# Patient Record
Sex: FEMALE | Race: WHITE | NOT HISPANIC OR LATINO | Employment: FULL TIME | URBAN - METROPOLITAN AREA
[De-identification: names, ages, dates, MRNs, and addresses within clinical notes are randomized per-mention and may not be internally consistent; named-entity substitution may affect disease eponyms.]

---

## 2018-11-24 ENCOUNTER — OFFICE VISIT (OUTPATIENT)
Dept: URGENT CARE | Facility: CLINIC | Age: 53
End: 2018-11-24
Payer: COMMERCIAL

## 2018-11-24 ENCOUNTER — HOSPITAL ENCOUNTER (OUTPATIENT)
Facility: MEDICAL CENTER | Age: 53
End: 2018-11-24
Attending: NURSE PRACTITIONER
Payer: COMMERCIAL

## 2018-11-24 VITALS
SYSTOLIC BLOOD PRESSURE: 112 MMHG | OXYGEN SATURATION: 98 % | BODY MASS INDEX: 28.32 KG/M2 | RESPIRATION RATE: 16 BRPM | TEMPERATURE: 99.3 F | DIASTOLIC BLOOD PRESSURE: 62 MMHG | HEART RATE: 97 BPM | HEIGHT: 65 IN | WEIGHT: 170 LBS

## 2018-11-24 DIAGNOSIS — R30.0 DYSURIA: ICD-10-CM

## 2018-11-24 LAB
APPEARANCE UR: CLEAR
BILIRUB UR STRIP-MCNC: NEGATIVE MG/DL
COLOR UR AUTO: YELLOW
GLUCOSE UR STRIP.AUTO-MCNC: NEGATIVE MG/DL
KETONES UR STRIP.AUTO-MCNC: NEGATIVE MG/DL
LEUKOCYTE ESTERASE UR QL STRIP.AUTO: NEGATIVE
NITRITE UR QL STRIP.AUTO: NEGATIVE
PH UR STRIP.AUTO: 5.5 [PH] (ref 5–8)
PROT UR QL STRIP: NEGATIVE MG/DL
RBC UR QL AUTO: NORMAL
SP GR UR STRIP.AUTO: 1.01
UROBILINOGEN UR STRIP-MCNC: 0.2 MG/DL

## 2018-11-24 PROCEDURE — 87086 URINE CULTURE/COLONY COUNT: CPT

## 2018-11-24 PROCEDURE — 99214 OFFICE O/P EST MOD 30 MIN: CPT | Performed by: NURSE PRACTITIONER

## 2018-11-24 PROCEDURE — 81002 URINALYSIS NONAUTO W/O SCOPE: CPT | Performed by: NURSE PRACTITIONER

## 2018-11-24 RX ORDER — NITROFURANTOIN 25; 75 MG/1; MG/1
100 CAPSULE ORAL EVERY 12 HOURS
Qty: 10 CAP | Refills: 0 | Status: SHIPPED | OUTPATIENT
Start: 2018-11-24 | End: 2018-11-29

## 2018-11-24 ASSESSMENT — ENCOUNTER SYMPTOMS
FLANK PAIN: 1
CHILLS: 0
FEVER: 0

## 2018-11-25 DIAGNOSIS — R30.0 DYSURIA: ICD-10-CM

## 2018-11-25 NOTE — PROGRESS NOTES
Subjective:      Madhavi Coffman is a 53 y.o. female who presents with UTI (x4 days, backache, painful urination, frequency, bladder pressure)            Dysuria    This is a new problem. Episode onset: pt reports pain with urination x 3 days. She states the symptoms have gotten more intense each day. Admits to mild left flank pain. Denies any fever, nausea or vomiting. The problem occurs every urination. The problem has been gradually worsening. The quality of the pain is described as burning. There is no history of pyelonephritis. Associated symptoms include flank pain (mild, left), frequency, hesitancy and urgency. Pertinent negatives include no chills or discharge. Associated symptoms comments: Pt also reports she has chronic back pain as well in the same area, feels like it usually does. She has tried increased fluids for the symptoms. The treatment provided no relief. There is no history of recurrent UTIs or urinary stasis.       Review of Systems   Constitutional: Negative for chills and fever.   Genitourinary: Positive for dysuria, flank pain (mild, left), frequency, hesitancy and urgency.   All other systems reviewed and are negative.    Past Medical History:   Diagnosis Date   • Allergy    • Anxiety    • Arthritis     back, osteoarthritis   • Depression    • Diverticulosis    • Hemorrhoids    • Hiatus hernia syndrome     had surgical repair   • Indigestion    • LBP (low back pain) 9/7/12    8/10   • Migraine without aura, with intractable migraine, so stated, without mention of status migrainosus    • Obesity    • Peripheral neuropathy    • Thoracic or lumbosacral neuritis or radiculitis, unspecified     bilateral L4-S1 radiculopathies      Past Surgical History:   Procedure Laterality Date   • INJ,SACROILIAC,ANES/STEROID  10/18/2013    Performed by Sonny Archuleta D.O. at SURGERY [facility] ORS   • INJ,SACROILIAC,ANES/STEROID  8/16/2013    Performed by Sonny Archuleta D.O. at SURGERY SURGICAL  [facility] ORS   • INJ,SACROILIAC,ANES/STEROID  8/16/2013    Performed by Jose Eduardo Archuleta D.O. at [facility] ORS   • NEURO DEST FACET L/S W/IG SNGL  4/19/2013    Performed by Jose Eduardo Archuleta D.O. at [facility] ORS   • NEURO DEST FACET L/S W/IG ADDL  4/19/2013    Performed by Jose Eduardo Archuleta D.O. at [facility] ORS   • NEURO DEST FACET L/S W/IG ADDL  4/19/2013    Performed by Jose Eduardo Archuleta D.O. at [facility] ORS   • NEURO DEST FACET L/S W/IG SNGL  3/29/2013    Performed by Jose Eduardo Archuleta D.O. at [facility] ORS   • NEURO DEST FACET L/S W/IG ADDL  3/29/2013    Performed by Jose Eduardo Archuleta D.O. at [facility] ORS   • NEURO DEST FACET L/S W/IG ADDL  3/29/2013    Performed by Jose Eduardo Archuleta D.O. at [facility] ORS   • NEURO DEST FACET L/S W/IG ADDL  3/29/2013    Performed by Jose Eduardo Archuleta D.O. at [facility] ORS   • INJ,SACROILIAC,ANES/STEROID  12/28/2012    Performed by Jose Eduardo Archuleta D.O. at [facility] ORS   • INJ,SACROILIAC,ANES/STEROID  11/9/2012    Performed by Jose Eduardo Archuleta D.O. at [facility] ORS   • THORACIC LAMINECTOMY  9/18/2012    Performed by King Leal M.D. at [facility] ORS   • SPINAL CORD STIMULATOR  9/18/2012    Performed by King Leal M.D. at [facility] ORS   • INJ,SACROILIAC,ANES/STEROID  7/27/2012    Performed by JOSE EDUARDO ARCHULETA at [facility] ORS   • INJ,SACROILIAC,ANES/STEROID  7/27/2012    Performed by JOSE EDUARDO ARCHULETA at [facility] ORS   • PB PERCUT IMPLNT NEUROELECT,EPIDURAL  6/22/2012    Performed by JOSE EDUARDO ARCHULETA at [facility] ORS   • PB PERCUT IMPLNT NEUROELECT,EPIDURAL  6/22/2012    Performed by JOSE EDUARDO ARCHULETA at SURGERY [facility] ORS   • LUMBAR FUSION POSTERIOR  7/26/2011    Performed by DANIAL KABA at SURGERY [facility] ORS   • LUMBAR DECOMPRESSION  7/26/2011    Performed by  "DANIAL KABA at SURGERY [facility] ORS   • HARDWARE REMOVAL ORTHO  7/26/2011    Performed by DANIAL KABA at SURGERY [facility] ORS   • S I JOINT FUSION  11/11/2010    Performed by DANIAL KABA at SURGERY [facility] ORS   • GASTRIC BYPASS LAPAROSCOPIC  4/5/2010    Performed by GANSER, JOHN H at SURGERY [facility] ORS   • HIATAL HERNIA REPAIR  4/5/2010    Performed by GANSER, JOHN H at SURGERY [facility] ORS   • LUMBAR FUSION POSTERIOR  11/4/2009    Performed by DANIAL KABA at SURGERY [facility] ORS   • LUMBAR DECOMPRESSION  11/4/2009    Performed by DANIAL KABA at SURGERY [facility] ORS   • PB REMOVAL OF OVARY/TUBE(S)  2005    left   • SIGMOID COLECTOMY  2004   • OTHER  1999    lasik surgery   • TONSILLECTOMY  1986   • CHOLECYSTECTOMY     • GASTRIC BYPASS LAPAROSCOPIC      x2   • PB REDUCTION OF LARGE BREAST        Social History     Social History   • Marital status:      Spouse name: N/A   • Number of children: N/A   • Years of education: N/A     Occupational History   • Not on file.     Social History Main Topics   • Smoking status: Former Smoker     Packs/day: 1.00     Years: 20.00     Types: Cigarettes     Quit date: 1/1/1997   • Smokeless tobacco: Never Used      Comment: 1995   • Alcohol use 0.5 oz/week     1 Standard drinks or equivalent per week      Comment: 2 per week   • Drug use: No   • Sexual activity: Not on file     Other Topics Concern   • Not on file     Social History Narrative   • No narrative on file          Objective:     /62   Pulse 97   Temp 37.4 °C (99.3 °F) (Temporal)   Resp 16   Ht 1.651 m (5' 5\")   Wt 77.1 kg (170 lb)   LMP 08/03/1999   SpO2 98%   BMI 28.29 kg/m²      Physical Exam   Constitutional: She is oriented to person, place, and time. Vital signs are normal. She appears well-developed and well-nourished.   HENT:   Head: Normocephalic and atraumatic.   Eyes: Pupils are equal, round, and reactive to light. EOM are normal. "   Neck: Normal range of motion.   Cardiovascular: Normal rate and regular rhythm.    Pulmonary/Chest: Effort normal.   Abdominal: There is tenderness in the suprapubic area. There is CVA tenderness (mild left).   Musculoskeletal: Normal range of motion.   Neurological: She is alert and oriented to person, place, and time.   Skin: Skin is warm and dry. Capillary refill takes less than 2 seconds.   Psychiatric: She has a normal mood and affect. Her speech is normal and behavior is normal. Thought content normal.   Vitals reviewed.         Lab Results   Component Value Date/Time    POCCOLOR yellow 11/24/2018 03:52 PM    POCAPPEAR clear 11/24/2018 03:52 PM    POCLEUKEST Negative 11/24/2018 03:52 PM    POCNITRITE Negative 11/24/2018 03:52 PM    POCUROBILIGE 0.2 11/24/2018 03:52 PM    POCPROTEIN Negative 11/24/2018 03:52 PM    POCURPH 5.5 11/24/2018 03:52 PM    POCBLOOD Trace 11/24/2018 03:52 PM    POCSPGRV 1.010 11/24/2018 03:52 PM    POCKETONES Negative 11/24/2018 03:52 PM    POCBILIRUBIN Negative 11/24/2018 03:52 PM    POCGLUCUA Negative 11/24/2018 03:52 PM           Assessment/Plan:     1. Dysuria  - POCT Urinalysis  - Urine Culture; Future  - nitrofurantoin monohydr macro (MACROBID) 100 MG Cap; Take 1 Cap by mouth every 12 hours for 5 days.  Dispense: 10 Cap; Refill: 0    Increase water intake  Alternate tylenol and ibuprofen as needed for pain  Will call with cx results if I need to change ABX  Supportive care, differential diagnoses, and indications for immediate follow-up discussed with patient.    Pathogenesis of diagnosis discussed including typical length and natural progression.      Instructed to return to  or nearest emergency department if symptoms fail to improve, for any change in condition, further concerns, or new concerning symptoms.  Patient states understanding of the plan of care and discharge instructions.

## 2018-11-26 ENCOUNTER — OFFICE VISIT (OUTPATIENT)
Dept: URGENT CARE | Facility: CLINIC | Age: 53
End: 2018-11-26
Payer: COMMERCIAL

## 2018-11-26 ENCOUNTER — HOSPITAL ENCOUNTER (OUTPATIENT)
Facility: MEDICAL CENTER | Age: 53
End: 2018-11-26
Attending: PHYSICIAN ASSISTANT
Payer: COMMERCIAL

## 2018-11-26 ENCOUNTER — APPOINTMENT (OUTPATIENT)
Dept: RADIOLOGY | Facility: IMAGING CENTER | Age: 53
End: 2018-11-26
Attending: PHYSICIAN ASSISTANT
Payer: COMMERCIAL

## 2018-11-26 VITALS
DIASTOLIC BLOOD PRESSURE: 64 MMHG | WEIGHT: 156 LBS | BODY MASS INDEX: 25.96 KG/M2 | SYSTOLIC BLOOD PRESSURE: 122 MMHG | HEART RATE: 105 BPM | OXYGEN SATURATION: 98 % | RESPIRATION RATE: 16 BRPM | TEMPERATURE: 98.4 F

## 2018-11-26 DIAGNOSIS — R30.0 DYSURIA: ICD-10-CM

## 2018-11-26 DIAGNOSIS — R11.2 NAUSEA AND VOMITING, INTRACTABILITY OF VOMITING NOT SPECIFIED, UNSPECIFIED VOMITING TYPE: ICD-10-CM

## 2018-11-26 DIAGNOSIS — R10.9 ABDOMINAL PAIN, UNSPECIFIED ABDOMINAL LOCATION: ICD-10-CM

## 2018-11-26 DIAGNOSIS — K59.00 CONSTIPATION, UNSPECIFIED CONSTIPATION TYPE: ICD-10-CM

## 2018-11-26 LAB
APPEARANCE UR: CLEAR
BILIRUB UR STRIP-MCNC: NORMAL MG/DL
COLOR UR AUTO: YELLOW
GLUCOSE UR STRIP.AUTO-MCNC: NORMAL MG/DL
KETONES UR STRIP.AUTO-MCNC: NORMAL MG/DL
LEUKOCYTE ESTERASE UR QL STRIP.AUTO: NORMAL
NITRITE UR QL STRIP.AUTO: NORMAL
PH UR STRIP.AUTO: 7 [PH] (ref 5–8)
PROT UR QL STRIP: NORMAL MG/DL
RBC UR QL AUTO: NORMAL
SP GR UR STRIP.AUTO: 1.01
UROBILINOGEN UR STRIP-MCNC: NORMAL MG/DL

## 2018-11-26 PROCEDURE — 81002 URINALYSIS NONAUTO W/O SCOPE: CPT | Performed by: PHYSICIAN ASSISTANT

## 2018-11-26 PROCEDURE — 99214 OFFICE O/P EST MOD 30 MIN: CPT | Performed by: PHYSICIAN ASSISTANT

## 2018-11-26 PROCEDURE — 87480 CANDIDA DNA DIR PROBE: CPT

## 2018-11-26 PROCEDURE — 87510 GARDNER VAG DNA DIR PROBE: CPT

## 2018-11-26 PROCEDURE — 74018 RADEX ABDOMEN 1 VIEW: CPT | Mod: 26 | Performed by: PHYSICIAN ASSISTANT

## 2018-11-26 PROCEDURE — 87660 TRICHOMONAS VAGIN DIR PROBE: CPT

## 2018-11-26 RX ORDER — ONDANSETRON 4 MG/1
4 TABLET, FILM COATED ORAL EVERY 4 HOURS PRN
Qty: 20 TAB | Refills: 0 | Status: SHIPPED | OUTPATIENT
Start: 2018-11-26 | End: 2023-04-08

## 2018-11-26 ASSESSMENT — ENCOUNTER SYMPTOMS
MYALGIAS: 0
CHILLS: 0
BLOOD IN STOOL: 0
BACK PAIN: 0
DIZZINESS: 0
DIARRHEA: 1
PALPITATIONS: 0
SHORTNESS OF BREATH: 0
BLURRED VISION: 0
EYE PAIN: 0
FEVER: 0
COUGH: 0
ABDOMINAL PAIN: 1
CONSTIPATION: 1
SORE THROAT: 0
VOMITING: 1
NAUSEA: 1

## 2018-11-26 NOTE — PROGRESS NOTES
Subjective:      Madhavi Coffman is a 53 y.o. female who presents with Allergic Reaction (to NITROFURANTOI MONO-MCR with vomiting and not getting better with diarrhea and constipation at the same time.)      HPI:  This is a new problem.  Patient was seen on 11/24/2018 for complaints of dysuria.  Urinalysis was negative for infection and urine was sent out for culture which so far has also shown no growth.  Patient was placed on Macrobid for her symptoms.  She states that she took her first pill yesterday morning and within an hour or so of taking the medication she vomited.  She said last night she tried taking the medication again and again vomited.  She has not taken the medication since that second dose and states that she still has some intermittent nausea/vomiting.  She also reports that she has some mild abdominal pain as well as constipation and diarrhea over the past two days.  She denies any suspect food intake.  The only change has been the Macrobid.  She also reports that she continues to have pain with urination.  She is not currently sexually active and has no concern for STDs.          Review of Systems   Constitutional: Negative for chills and fever.   HENT: Negative for congestion and sore throat.    Eyes: Negative for blurred vision and pain.   Respiratory: Negative for cough and shortness of breath.    Cardiovascular: Negative for chest pain and palpitations.   Gastrointestinal: Positive for abdominal pain, constipation, diarrhea, nausea and vomiting. Negative for blood in stool and melena.   Musculoskeletal: Negative for back pain and myalgias.   Skin: Negative for rash.   Neurological: Negative for dizziness.       PMH:  has a past medical history of Allergy; Anxiety; Arthritis; Depression; Diverticulosis; Hemorrhoids; Hiatus hernia syndrome; Indigestion; LBP (low back pain) (9/7/12); Migraine without aura, with intractable migraine, so stated, without mention of status migrainosus; Obesity;  Peripheral neuropathy; and Thoracic or lumbosacral neuritis or radiculitis, unspecified. She also has no past medical history of Breast cancer (HCC).  MEDS:   Current Outpatient Prescriptions:   •  nitrofurantoin monohydr macro (MACROBID) 100 MG Cap, Take 1 Cap by mouth every 12 hours for 5 days., Disp: 10 Cap, Rfl: 0  •  topiramate (TOPAMAX) 100 MG Tab, TAKE 1 TABLET BY MOUTH IN THE EVENING, Disp: 90 Tab, Rfl: 3  •  ALPRAZolam (XANAX PO), Take  by mouth., Disp: , Rfl:   •  TRAZODONE HCL PO, Take  by mouth., Disp: , Rfl:   •  amoxicillin-clavulanate (AUGMENTIN) 875-125 MG Tab, Take 1 Tab by mouth 2 times a day., Disp: 14 Tab, Rfl: 0  •  Sumatriptan-Naproxen Sodium (TREXIMET)  MG TABS, 1 tab at headache onset; repeat in 1 hour prn, Disp: 27 Tab, Rfl: 3  •  oxycodone-acetaminophen (PERCOCET) 7.5-325 MG per tablet, Take 1-2 Tabs by mouth every four hours as needed., Disp: , Rfl:   ALLERGIES:   Allergies   Allergen Reactions   • Cleocin [Clindamycin Hcl]      Stomach upset   • Neosporin [Neomycin-Bacitracin-Polymyxin] Vomiting and Swelling   • Sudafed [Pseudoephedrine Hcl] Rash   • Tape Contact Dermatitis     SURGHX:   Past Surgical History:   Procedure Laterality Date   • INJ,SACROILIAC,ANES/STEROID  10/18/2013    Performed by Sonny Archuleta D.O. at [facility] ORS   • INJ,SACROILIAC,ANES/STEROID  8/16/2013    Performed by Sonny Archuleta D.O. at [facility] ORS   • INJ,SACROILIAC,ANES/STEROID  8/16/2013    Performed by Sonny Archuleta D.O. at [facility] ORS   • NEURO DEST FACET L/S W/IG SNGL  4/19/2013    Performed by Sonny Archuleta D.O. at [facility] ORS   • NEURO DEST FACET L/S W/IG ADDL  4/19/2013    Performed by Sonny Archuleta D.O. at [facility] ORS   • NEURO DEST FACET L/S W/IG ADDL  4/19/2013    Performed by Sonny Archuleta D.O. at [facility] ORS   • NEURO DEST FACET L/S W/IG SNGL  3/29/2013    Performed by Sonny BURKS  ACACIA Jones at [facility] ORS   • NEURO DEST FACET L/S W/IG ADDL  3/29/2013    Performed by Sonny Jones D.O. at [facility]   • NEURO DEST FACET L/S W/IG ADDL  3/29/2013    Performed by Sonny Jones D.O. at [facility]   • NEURO DEST FACET L/S W/IG ADDL  3/29/2013    Performed by Sonny Jones D.O. at [facility]   • INJ,SACROILIAC,ANES/STEROID  12/28/2012    Performed by Sonny Jones D.O. at [facility]   • INJ,SACROILIAC,ANES/STEROID  11/9/2012    Performed by Sonny Jones D.O. at [facility]   • THORACIC LAMINECTOMY  9/18/2012    Performed by King Leal M.D. at [facility]   • SPINAL CORD STIMULATOR  9/18/2012    Performed by King Leal M.D. at [facility]   • INJ,SACROILIAC,ANES/STEROID  7/27/2012    Performed by SONNY JONES at [facility]   • INJ,SACROILIAC,ANES/STEROID  7/27/2012    Performed by SONNY JONES at [facility]   • PB PERCUT IMPLNT NEUROELECT,EPIDURAL  6/22/2012    Performed by SONNY JONES at [facility]   • PB PERCUT IMPLNT NEUROELECT,EPIDURAL  6/22/2012    Performed by SONNY JONES at [facility] ORS   • LUMBAR FUSION POSTERIOR  7/26/2011    Performed by DANIAL KABA at [facility] ORS   • LUMBAR DECOMPRESSION  7/26/2011    Performed by DANIAL KABA at [facility] ORS   • HARDWARE REMOVAL ORTHO  7/26/2011    Performed by DANIAL KABA at [facility] ORS   • S I JOINT FUSION  11/11/2010    Performed by DANIAL KABA at [facility] ORS   • GASTRIC BYPASS LAPAROSCOPIC  4/5/2010    Performed by GANSER, JOHN H at [facility]   • HIATAL HERNIA REPAIR  4/5/2010    Performed by GANSER, JOHN H at SURGERY [facility] ORS   • LUMBAR FUSION POSTERIOR  11/4/2009    Performed by DANIAL KABA at SURGERY [facility] ORS   •  LUMBAR DECOMPRESSION  11/4/2009    Performed by DANIAL KABA at SURGERY [facility] ORS   • PB REMOVAL OF OVARY/TUBE(S)  2005    left   • SIGMOID COLECTOMY  2004   • OTHER  1999    lasik surgery   • TONSILLECTOMY  1986   • CHOLECYSTECTOMY     • GASTRIC BYPASS LAPAROSCOPIC      x2   • PB REDUCTION OF LARGE BREAST       SOCHX:  reports that she quit smoking about 21 years ago. Her smoking use included Cigarettes. She has a 20.00 pack-year smoking history. She has never used smokeless tobacco. She reports that she drinks about 0.5 oz of alcohol per week . She reports that she does not use drugs.  FH: Family history was reviewed, no pertinent findings to report     Objective:     /64 (BP Location: Left arm, Patient Position: Sitting, BP Cuff Size: Adult)   Pulse (!) 105   Temp 36.9 °C (98.4 °F) (Temporal)   Resp 16   Wt 70.8 kg (156 lb)   LMP 08/03/1999   SpO2 98%   BMI 25.96 kg/m²        Physical Exam   Constitutional: She is oriented to person, place, and time. She appears well-developed and well-nourished.   HENT:   Head: Normocephalic and atraumatic.   Right Ear: External ear normal.   Left Ear: External ear normal.   Eyes: Pupils are equal, round, and reactive to light. Conjunctivae are normal.   Neck: Normal range of motion.   Cardiovascular: Normal rate, regular rhythm and normal heart sounds.    No murmur heard.  Pulmonary/Chest: Effort normal and breath sounds normal. She has no wheezes.   Abdominal: Soft. Normal appearance and bowel sounds are normal. She exhibits no distension. There is no hepatosplenomegaly. There is tenderness (patient has mild tenderness to palpation over the abdomen in the right upper and lower quadrants with no guarding or rigidity noted) in the right upper quadrant and right lower quadrant. There is no rigidity, no rebound, no guarding, no CVA tenderness, no tenderness at McBurney's point and negative Santillan's sign.   Lymphadenopathy:     She has no cervical adenopathy.    Neurological: She is alert and oriented to person, place, and time.   Skin: Skin is warm and dry. Capillary refill takes less than 2 seconds.   Psychiatric: Her behavior is normal. Judgment normal. Her mood appears anxious.   Vitals reviewed.      [id number] VIEW  Impression:   1.  There is a moderate amount stool throughout the colon.  2.  There is no evidence of bowel obstruction.       Assessment/Plan:     1. Constipation, unspecified constipation type  - [id number] VIEW  - Patient has Miralax at home which she will try  - If Miralax does not resolve symptoms advised patient to try colace  - Increase fluid intake    2. Dysuria  - VAGINAL PATHOGENS DNA PANEL; Future  - POCT Urinalysis  - REFERRAL TO UROLOGY    3. Nausea and vomiting, intractability of vomiting not specified, unspecified vomiting type  - ondansetron (ZOFRAN) 4 MG Tab tablet; Take 1 Tab by mouth every four hours as needed for Nausea/Vomiting.  Dispense: 20 Tab; Refill: 0        Differential Diagnosis, natural history, and supportive care discussed. Return to the Urgent Care or follow up with your PCP if symptoms fail to resolve, or for any new or worsening symptoms. Emergency room precautions discussed. Patient and/or family appears understanding of information.

## 2018-11-27 LAB
BACTERIA UR CULT: NORMAL
CANDIDA DNA VAG QL PROBE+SIG AMP: NEGATIVE
G VAGINALIS DNA VAG QL PROBE+SIG AMP: NEGATIVE
SIGNIFICANT IND 70042: NORMAL
SITE SITE: NORMAL
SOURCE SOURCE: NORMAL
T VAGINALIS DNA VAG QL PROBE+SIG AMP: NEGATIVE

## 2018-12-24 ENCOUNTER — OFFICE VISIT (OUTPATIENT)
Dept: URGENT CARE | Facility: CLINIC | Age: 53
End: 2018-12-24
Payer: COMMERCIAL

## 2018-12-24 VITALS
OXYGEN SATURATION: 98 % | TEMPERATURE: 98.6 F | HEIGHT: 65 IN | DIASTOLIC BLOOD PRESSURE: 84 MMHG | RESPIRATION RATE: 12 BRPM | BODY MASS INDEX: 25.99 KG/M2 | SYSTOLIC BLOOD PRESSURE: 100 MMHG | HEART RATE: 65 BPM | WEIGHT: 156 LBS

## 2018-12-24 DIAGNOSIS — M62.838 MUSCLE SPASM: ICD-10-CM

## 2018-12-24 DIAGNOSIS — M54.5 LOW BACK PAIN, UNSPECIFIED BACK PAIN LATERALITY, UNSPECIFIED CHRONICITY, WITH SCIATICA PRESENCE UNSPECIFIED: ICD-10-CM

## 2018-12-24 PROCEDURE — 99214 OFFICE O/P EST MOD 30 MIN: CPT | Performed by: PHYSICIAN ASSISTANT

## 2018-12-24 RX ORDER — KETOROLAC TROMETHAMINE 30 MG/ML
60 INJECTION, SOLUTION INTRAMUSCULAR; INTRAVENOUS ONCE
Status: COMPLETED | OUTPATIENT
Start: 2018-12-24 | End: 2018-12-24

## 2018-12-24 RX ORDER — BACLOFEN 10 MG/1
10 TABLET ORAL 3 TIMES DAILY
Qty: 30 TAB | Refills: 0 | Status: SHIPPED | OUTPATIENT
Start: 2018-12-24 | End: 2023-04-08

## 2018-12-24 RX ADMIN — KETOROLAC TROMETHAMINE 60 MG: 30 INJECTION, SOLUTION INTRAMUSCULAR; INTRAVENOUS at 17:53

## 2018-12-27 ASSESSMENT — ENCOUNTER SYMPTOMS
FEVER: 0
SORE THROAT: 0
BACK PAIN: 1
SHORTNESS OF BREATH: 0
DIARRHEA: 0
EYE DISCHARGE: 0
WHEEZING: 0
EYE REDNESS: 0
SINUS PAIN: 0
NECK PAIN: 0
WEAKNESS: 1
BOWEL INCONTINENCE: 0
CHILLS: 0
ABDOMINAL PAIN: 0
LEG PAIN: 1
TINGLING: 1

## 2018-12-27 NOTE — PROGRESS NOTES
"Subjective:      Madhavi Coffman is a 53 y.o. female who presents with Spasms (in back x today )            Pt is a 54 y/o female who presents to  with lower bilateral back spasm for the last 1-2 days. She reports long hx of back pain with SI fusion and back stimulator. She has been managing her pain well with yoga and CPD creams- however 2 days ago she believes she might of slept wrong when she awoke she was very tight on her right side and now on both lower aspects of her back. She denies any new leg weakness- she reports that her baseline is weakness on the left lower ext. She denies any incontinence, or foot drop.         Back Pain   This is a chronic problem. Episode onset: New spasm- 2 days ago.  The problem occurs constantly. The problem is unchanged. The pain is present in the lumbar spine and gluteal. The pain is at a severity of 7/10. The pain is moderate. The symptoms are aggravated by position. Associated symptoms include leg pain, tingling and weakness. Pertinent negatives include no abdominal pain, bladder incontinence, bowel incontinence, dysuria, fever or pelvic pain. Treatments tried: As above.        Review of Systems   Constitutional: Negative for chills and fever.   HENT: Negative for sinus pain and sore throat.    Eyes: Negative for discharge and redness.   Respiratory: Negative for shortness of breath and wheezing.    Cardiovascular: Negative for leg swelling.   Gastrointestinal: Negative for abdominal pain, bowel incontinence and diarrhea.   Genitourinary: Negative for bladder incontinence, dysuria and pelvic pain.   Musculoskeletal: Positive for back pain. Negative for joint pain and neck pain.   Neurological: Positive for tingling and weakness.   All other systems reviewed and are negative.         Objective:     /84   Pulse 65   Temp 37 °C (98.6 °F)   Resp 12   Ht 1.651 m (5' 5\")   Wt 70.8 kg (156 lb)   LMP 08/03/1999   SpO2 98%   BMI 25.96 kg/m²    PMH:  has a past " medical history of Allergy; Anxiety; Arthritis; Depression; Diverticulosis; Hemorrhoids; Hiatus hernia syndrome; Indigestion; LBP (low back pain) (9/7/12); Migraine without aura, with intractable migraine, so stated, without mention of status migrainosus; Obesity; Peripheral neuropathy; and Thoracic or lumbosacral neuritis or radiculitis, unspecified. She also has no past medical history of Breast cancer (HCC).  MEDS:   Current Outpatient Prescriptions:   •  baclofen (LIORESAL) 10 MG Tab, Take 1 Tab by mouth 3 times a day., Disp: 30 Tab, Rfl: 0  •  ondansetron (ZOFRAN) 4 MG Tab tablet, Take 1 Tab by mouth every four hours as needed for Nausea/Vomiting. (Patient not taking: Reported on 12/24/2018), Disp: 20 Tab, Rfl: 0  •  topiramate (TOPAMAX) 100 MG Tab, TAKE 1 TABLET BY MOUTH IN THE EVENING (Patient not taking: Reported on 12/24/2018), Disp: 90 Tab, Rfl: 3  •  ALPRAZolam (XANAX PO), Take  by mouth., Disp: , Rfl:   •  TRAZODONE HCL PO, Take  by mouth., Disp: , Rfl:   •  amoxicillin-clavulanate (AUGMENTIN) 875-125 MG Tab, Take 1 Tab by mouth 2 times a day. (Patient not taking: Reported on 12/24/2018), Disp: 14 Tab, Rfl: 0  •  Sumatriptan-Naproxen Sodium (TREXIMET)  MG TABS, 1 tab at headache onset; repeat in 1 hour prn (Patient not taking: Reported on 12/24/2018), Disp: 27 Tab, Rfl: 3  •  oxycodone-acetaminophen (PERCOCET) 7.5-325 MG per tablet, Take 1-2 Tabs by mouth every four hours as needed., Disp: , Rfl:   ALLERGIES:   Allergies   Allergen Reactions   • Cleocin [Clindamycin Hcl]      Stomach upset   • Neosporin [Neomycin-Bacitracin-Polymyxin] Vomiting and Swelling   • Sudafed [Pseudoephedrine Hcl] Rash   • Tape Contact Dermatitis     SURGHX:   Past Surgical History:   Procedure Laterality Date   • INJ,SACROILIAC,ANES/STEROID  10/18/2013    Performed by Sonny Archuleta D.O. [facility]   • INJ,SACROILIAC,ANES/STEROID  8/16/2013    Performed by Sonny Archuleta D.O. at [facility]   • INJ,SACROILIAC,ANES/STEROID  8/16/2013    Performed by Jose Eduardo Archuleta D.O. at [facility]S   • NEURO DEST FACET L/S W/IG SNGL  4/19/2013    Performed by Jose Eduardo Archuleta D.O. at [facility]   • NEURO DEST FACET L/S W/IG ADDL  4/19/2013    Performed by Jose Eduardo Archuleta D.O. at [facility]   • NEURO DEST FACET L/S W/IG ADDL  4/19/2013    Performed by Jose Eduardo Archuleta D.O. at [facility]   • NEURO DEST FACET L/S W/IG SNGL  3/29/2013    Performed by Jose Eduardo Archuleta D.O. at [facility]   • NEURO DEST FACET L/S W/IG ADDL  3/29/2013    Performed by Jose Eduardo Archuleta D.O. at [facility]   • NEURO DEST FACET L/S W/IG ADDL  3/29/2013    Performed by Jose Eduardo Archuleta D.O. at [facility]   • NEURO DEST FACET L/S W/IG ADDL  3/29/2013    Performed by Jose Eduardo Archuleta D.O. at [facility]   • INJ,SACROILIAC,ANES/STEROID  12/28/2012    Performed by Jose Eduardo Archuleta D.O. at [facility]   • INJ,SACROILIAC,ANES/STEROID  11/9/2012    Performed by Jose Eduardo Archuleta D.O. at [facility] OR[facility]   • THORACIC LAMINECTOMY  9/18/2012    Performed by King Leal M.D. at [facility] ORS   • SPINAL CORD STIMULATOR  9/18/2012    Performed by King Leal M.D. at [facility] ORS   • INJ,SACROILIAC,ANES/STEROID  7/27/2012    Performed by JOSE EDUARDO ARCHULETA at [facility]   • INJ,SACROILIAC,ANES/STEROID  7/27/2012    Performed by JOSE EDUARDO ARCHULETA at [facility]   • PB PERCUT IMPLNT NEUROELECT,EPIDURAL  6/22/2012    Performed by JOSE EDUARDO ARCHULETA at [facility] ORS   • PB PERCUT IMPLNT NEUROELECT,EPIDURAL  6/22/2012    Performed by JOSE EDUARDO ARCHULETA at [facility]   • LUMBAR FUSION POSTERIOR  7/26/2011    Performed by DANIAL KABA at [facility]   • LUMBAR DECOMPRESSION  7/26/2011    Performed by  DANIAL KABA at [facility]   • HARDWARE REMOVAL ORTHO  7/26/2011    Performed by DANIAL KABA at [facility]   • S I JOINT FUSION  11/11/2010    Performed by DANIAL KABA at [facility]   • GASTRIC BYPASS LAPAROSCOPIC  4/5/2010    Performed by GANSER, JOHN H at SUR[facility] ORS   • HIATAL HERNIA REPAIR  4/5/2010    Performed by GANSER, JOHN H at SUR[facility] ORS   • LUMBAR FUSION POSTERIOR  11/4/2009    Performed by DANIAL KABA at [facility]S   • LUMBAR DECOMPRESSION  11/4/2009    Performed by DANIAL KABA at [facility]   • PB REMOVAL OF OVARY/TUBE(S)  2005    left   • SIGMOID COLECTOMY  2004   • OTHER  1999    lasik surgery   • TONSILLECTOMY  1986   • CHOLECYSTECTOMY     • GASTRIC BYPASS LAPAROSCOPIC      x2   • PB REDUCTION OF LARGE BREAST       SOCHX:  reports that she quit smoking about 22 years ago. Her smoking use included Cigarettes. She has a 20.00 pack-year smoking history. She has never used smokeless tobacco. She reports that she drinks about 0.5 oz of alcohol per week . She reports that she does not use drugs.  FH: Family history was reviewed, no pertinent findings to report    Physical Exam   Constitutional: She is oriented to person, place, and time. She appears well-developed and well-nourished. No distress.   HENT:   Head: Normocephalic and atraumatic.   Right Ear: External ear normal.   Left Ear: External ear normal.   Mouth/Throat: Oropharynx is clear and moist. No oropharyngeal exudate.   Eyes: Pupils are equal, round, and reactive to light. Conjunctivae and EOM are normal.   Neck: Normal range of motion. Neck supple. No tracheal deviation present.   Cardiovascular: Normal rate and regular rhythm.    No murmur heard.  Pulmonary/Chest: Effort normal.   Musculoskeletal:        Lumbar back: She exhibits decreased range of motion, tenderness and spasm. She exhibits normal pulse.        Back:    Without  discrete midline tenderness- spasm to area on chart. Left lower ext .with slight weakness. DPF intact and symmetrical. DTRs 2+. N/V intact distally.    Neurological: She is alert and oriented to person, place, and time.   Skin: Skin is warm. No rash noted.   Psychiatric: She has a normal mood and affect. Her behavior is normal. Judgment and thought content normal.   Vitals reviewed.              Assessment/Plan:     1. Muscle spasm  - ketorolac (TORADOL) injection 60 mg; 2 mL by Intramuscular route Once.  - baclofen (LIORESAL) 10 MG Tab; Take 1 Tab by mouth 3 times a day.  Dispense: 30 Tab; Refill: 0    2. Low back pain, unspecified back pain laterality, unspecified chronicity, with sciatica presence unspecified  - ketorolac (TORADOL) injection 60 mg; 2 mL by Intramuscular route Once.  - baclofen (LIORESAL) 10 MG Tab; Take 1 Tab by mouth 3 times a day.  Dispense: 30 Tab; Refill: 0    Pt has prior hx of these episodes however notes that it has been a few years. She no longer utilizes her stimulator however still has it.   Pt. Has been successful in the past with Baclofen. She is to avoid other NSAIDs at this time.   Patient given precautionary s/sx that mandate immediate follow up and evaluation in the ED. Advised of risks of not doing so.    DDX, Supportive care, and indications for immediate follow-up discussed with patient.    Instructed to return to clinic or nearest emergency department if we are not available for any change in condition, further concerns, or worsening of symptoms.    The patient demonstrated a good understanding and agreed with the treatment plan.  Please note that this dictation was created using voice recognition software. I have made every reasonable attempt to correct obvious errors, but I expect that there are errors of grammar and possibly content that I did not discover before finalizing the note.

## 2019-06-02 ENCOUNTER — APPOINTMENT (OUTPATIENT)
Dept: RADIOLOGY | Facility: IMAGING CENTER | Age: 54
End: 2019-06-02
Attending: NURSE PRACTITIONER
Payer: COMMERCIAL

## 2019-06-02 ENCOUNTER — OFFICE VISIT (OUTPATIENT)
Dept: URGENT CARE | Facility: CLINIC | Age: 54
End: 2019-06-02
Payer: COMMERCIAL

## 2019-06-02 VITALS
BODY MASS INDEX: 25.99 KG/M2 | TEMPERATURE: 99.2 F | SYSTOLIC BLOOD PRESSURE: 126 MMHG | OXYGEN SATURATION: 98 % | DIASTOLIC BLOOD PRESSURE: 88 MMHG | HEART RATE: 89 BPM | WEIGHT: 156 LBS | HEIGHT: 65 IN | RESPIRATION RATE: 16 BRPM

## 2019-06-02 DIAGNOSIS — Z87.19 HISTORY OF REPAIR OF HIATAL HERNIA: ICD-10-CM

## 2019-06-02 DIAGNOSIS — Z98.890 HISTORY OF REPAIR OF HIATAL HERNIA: ICD-10-CM

## 2019-06-02 DIAGNOSIS — R14.2 BELCHING: ICD-10-CM

## 2019-06-02 DIAGNOSIS — R07.89 CHEST DISCOMFORT: ICD-10-CM

## 2019-06-02 PROCEDURE — 71046 X-RAY EXAM CHEST 2 VIEWS: CPT | Mod: TC | Performed by: NURSE PRACTITIONER

## 2019-06-02 PROCEDURE — 99214 OFFICE O/P EST MOD 30 MIN: CPT | Performed by: NURSE PRACTITIONER

## 2019-06-02 PROCEDURE — 93000 ELECTROCARDIOGRAM COMPLETE: CPT | Performed by: NURSE PRACTITIONER

## 2019-06-02 ASSESSMENT — ENCOUNTER SYMPTOMS
ROS GI COMMENTS: BELCHING
HEARTBURN: 1

## 2019-06-03 NOTE — PROGRESS NOTES
Subjective:      Madhavi Coffman is a 54 y.o. female who presents with Hiatal Hernia (has hx of hiatal hernia, had surgery for it years ago but thinks it might be back, shoulder hurts whenever she eats or drinks anything)            This is a new problem. Pt reports new onset of hiatal hernia symptoms about one year ago. She states these symptoms are familiar because she has a hx of hiatal hernia repair several years ago. Also has hx of gastric bypass. She states her symptoms have recently been getting worse with more chest discomfort, feeling of reflux and belching. She reports new onset of left shoulder discomfort in the last week. She denies any fevers. Is tolerating oral fluids and small amounts of food. She is taking prilosec daily and using TUMS intermittently. This is not providing any relief.         Review of Systems   Cardiovascular:        Chest discomfort   Gastrointestinal: Positive for heartburn.        Belching   All other systems reviewed and are negative.    Past Medical History:   Diagnosis Date   • Allergy    • Anxiety    • Arthritis     back, osteoarthritis   • Depression    • Diverticulosis    • Hemorrhoids    • Hiatus hernia syndrome     had surgical repair   • Indigestion    • LBP (low back pain) 9/7/12    8/10   • Migraine without aura, with intractable migraine, so stated, without mention of status migrainosus    • Obesity    • Peripheral neuropathy    • Thoracic or lumbosacral neuritis or radiculitis, unspecified     bilateral L4-S1 radiculopathies      Past Surgical History:   Procedure Laterality Date   • INJ,SACROILIAC,ANES/STEROID  10/18/2013    Performed by Sonny Archuleta D.O. at [facility]   • INJ,SACROILIAC,ANES/STEROID  8/16/2013    Performed by Sonny Archuleta D.O. at [facility]   • INJ,SACROILIAC,ANES/STEROID  8/16/2013    Performed by Sonny Archuleta D.O. at [facility]   • NEURO DEST FACET L/S W/IG SNGL  4/19/2013     Performed by Sonny Archuleta D.O. at [facility]   • NEURO DEST FACET L/S W/IG ADDL  4/19/2013    Performed by Sonny Archuleta D.O. at [facility]   • NEURO DEST FACET L/S W/IG ADDL  4/19/2013    Performed by Sonny Archuleta D.O. at [facility]   • NEURO DEST FACET L/S W/IG SNGL  3/29/2013    Performed by Sonny Archuleta D.O. at [facility]   • NEURO DEST FACET L/S W/IG ADDL  3/29/2013    Performed by Sonny Archuleta D.O. at [facility] OR[facility]   • NEURO DEST FACET L/S W/IG ADDL  3/29/2013    Performed by Sonny Archuleta D.O. at [facility]   • NEURO DEST FACET L/S W/IG ADDL  3/29/2013    Performed by Sonny Archuleta D.O. at [facility]   • INJ,SACROILIAC,ANES/STEROID  12/28/2012    Performed by Sonny Archuleta D.O. at [facility]   • INJ,SACROILIAC,ANES/STEROID  11/9/2012    Performed by Sonny Archuleta D.O. at [facility] OR[facility]   • THORACIC LAMINECTOMY  9/18/2012    Performed by King Leal M.D. at [facility] ORS   • SPINAL CORD STIMULATOR  9/18/2012    Performed by King Leal M.D. at [facility] ORS   • INJ,SACROILIAC,ANES/STEROID  7/27/2012    Performed by SONNY ARCHULETA at [facility] ORS   • INJ,SACROILIAC,ANES/STEROID  7/27/2012    Performed by SONNY ARCHULETA at [facility]   • PB PERCUT IMPLNT NEUROELECT,EPIDURAL  6/22/2012    Performed by SONNY ARCHULETA at [facility] ORS   • PB PERCUT IMPLNT NEUROELECT,EPIDURAL  6/22/2012    Performed by SONNY ARCHULETA at [facility] ORS   • LUMBAR FUSION POSTERIOR  7/26/2011    Performed by DANIAL KABA at [facility] ORS   • LUMBAR DECOMPRESSION  7/26/2011    Performed by DANIAL KABA at [facility]   • HARDWARE REMOVAL ORTHO  7/26/2011    Performed by DANIAL KABA at [facility]   • S I JOINT FUSION   "11/11/2010    Performed by DANIAL KABA at SUR[facility]ERY [facility]   • GASTRIC BYPASS LAPAROSCOPIC  4/5/2010    Performed by GANSER, JOHN H at SURGERY [facility]S   • HIATAL HERNIA REPAIR  4/5/2010    Performed by GANSER, JOHN H at SUR[facility]   • LUMBAR FUSION POSTERIOR  11/4/2009    Performed by DANIAL KABA at SUR[facility]S   • LUMBAR DECOMPRESSION  11/4/2009    Performed by DANIAL KABA at SUR[facility]   • PB REMOVAL OF OVARY/TUBE(S)  2005    left   • SIGMOID COLECTOMY  2004   • OTHER  1999    lasik surgery   • TONSILLECTOMY  1986   • CHOLECYSTECTOMY     • GASTRIC BYPASS LAPAROSCOPIC      x2   • PB REDUCTION OF LARGE BREAST        Social History     Social History   • Marital status:      Spouse name: N/A   • Number of children: N/A   • Years of education: N/A     Occupational History   • Not on file.     Social History Main Topics   • Smoking status: Former Smoker     Packs/day: 1.00     Years: 20.00     Types: Cigarettes     Quit date: 1/1/1997   • Smokeless tobacco: Never Used      Comment: 1995   • Alcohol use 0.5 oz/week     1 Standard drinks or equivalent per week      Comment: 2 per week   • Drug use: No   • Sexual activity: Not on file     Other Topics Concern   • Not on file     Social History Narrative   • No narrative on file          Objective:     /88   Pulse 89   Temp 37.3 °C (99.2 °F)   Resp 16   Ht 1.651 m (5' 5\")   Wt 70.8 kg (156 lb)   LMP 08/03/1999   SpO2 98%   BMI 25.96 kg/m²      Physical Exam   Constitutional: She is oriented to person, place, and time. Vital signs are normal. She appears well-developed and well-nourished.   HENT:   Head: Normocephalic and atraumatic.   Nose: Nose normal.   Eyes: Pupils are equal, round, and reactive to light. EOM are normal.   Neck: Normal range of motion.   Cardiovascular: Normal rate, regular rhythm and normal heart sounds.    Pulmonary/Chest: Effort normal and breath sounds " normal.   Abdominal: Soft. Normal appearance and bowel sounds are normal.       Musculoskeletal: Normal range of motion.   Neurological: She is alert and oriented to person, place, and time.   Skin: Skin is warm and dry. Capillary refill takes less than 2 seconds.   Psychiatric: She has a normal mood and affect. Her speech is normal and behavior is normal. Thought content normal.   Vitals reviewed.         CXR: no acute cardiopulmonary process by my read, radiology pending.       6/2/2019 7:33 PM    HISTORY/REASON FOR EXAM:  Shortness of Breath      TECHNIQUE/EXAM DESCRIPTION AND NUMBER OF VIEWS:  Two views of the chest.    COMPARISON:  7/20/2011.    FINDINGS:    No pulmonary infiltrates or consolidations are noted.  No pleural effusions, no pneumothorax are appreciated.  Normal cardiopericardial silhouette.     Impression         1. No active cardiopulmonary abnormalities are identified.     EKG: NSR rate: 85, normal axis, normal intervals, no evidence of ischemia or hypertrophy.    Assessment/Plan:     1. Chest discomfort  - DX-CHEST-2 VIEWS; Future  - EKG    2. Belching    3. History of repair of hiatal hernia  - REFERRAL TO GENERAL SURGERY    Discussed with patient at this time her symptoms do seem to closely correlate with hiatal hernia. I do not appreciate one on exam, but many of her symptoms are consistent with this dx  There does not appear to be any evidence of diaphragmatic hernia or free air  I will send her in a referral to see gen surg to be evaluated further, she agrees  DDX discussed with patient include but are not limited to hiatal hernia, COPD, GERD, MI, ACS, MSK pain  Advised her to continue taking prilosec as directed  Eat small meals  Strict ER precautions for worsening condition  Supportive care, differential diagnoses, and indications for immediate follow-up discussed with patient.    Pathogenesis of diagnosis discussed including typical length and natural progression.      Instructed to  return to  or nearest emergency department if symptoms fail to improve, for any change in condition, further concerns, or new concerning symptoms.  Patient states understanding of the plan of care and discharge instructions.

## 2019-06-20 ENCOUNTER — TELEPHONE (OUTPATIENT)
Dept: URGENT CARE | Facility: CLINIC | Age: 54
End: 2019-06-20

## 2019-06-20 NOTE — TELEPHONE ENCOUNTER
Patient called saying her general surgery referral got sent back because they said she needed to see a GI doctor instead for her symptoms.

## 2019-09-25 ENCOUNTER — APPOINTMENT (OUTPATIENT)
Dept: URGENT CARE | Facility: CLINIC | Age: 54
End: 2019-09-25

## 2019-09-26 ENCOUNTER — APPOINTMENT (OUTPATIENT)
Dept: RADIOLOGY | Facility: MEDICAL CENTER | Age: 54
End: 2019-09-26
Attending: EMERGENCY MEDICINE
Payer: MEDICAID

## 2019-09-26 ENCOUNTER — HOSPITAL ENCOUNTER (EMERGENCY)
Facility: MEDICAL CENTER | Age: 54
End: 2019-09-26
Attending: EMERGENCY MEDICINE
Payer: MEDICAID

## 2019-09-26 VITALS
HEIGHT: 65 IN | DIASTOLIC BLOOD PRESSURE: 85 MMHG | HEART RATE: 73 BPM | TEMPERATURE: 97.9 F | SYSTOLIC BLOOD PRESSURE: 136 MMHG | BODY MASS INDEX: 26.01 KG/M2 | WEIGHT: 156.09 LBS | RESPIRATION RATE: 15 BRPM | OXYGEN SATURATION: 95 %

## 2019-09-26 DIAGNOSIS — J06.9 UPPER RESPIRATORY TRACT INFECTION, UNSPECIFIED TYPE: ICD-10-CM

## 2019-09-26 DIAGNOSIS — R07.81 PLEURITIC CHEST PAIN: ICD-10-CM

## 2019-09-26 LAB
ALBUMIN SERPL BCP-MCNC: 4.6 G/DL (ref 3.2–4.9)
ALBUMIN/GLOB SERPL: 2.6 G/DL
ALP SERPL-CCNC: 100 U/L (ref 30–99)
ALT SERPL-CCNC: 10 U/L (ref 2–50)
ANION GAP SERPL CALC-SCNC: 7 MMOL/L (ref 0–11.9)
AST SERPL-CCNC: 21 U/L (ref 12–45)
BASOPHILS # BLD AUTO: 0.3 % (ref 0–1.8)
BASOPHILS # BLD: 0.02 K/UL (ref 0–0.12)
BILIRUB SERPL-MCNC: 0.4 MG/DL (ref 0.1–1.5)
BUN SERPL-MCNC: 12 MG/DL (ref 8–22)
CALCIUM SERPL-MCNC: 10.1 MG/DL (ref 8.5–10.5)
CHLORIDE SERPL-SCNC: 106 MMOL/L (ref 96–112)
CO2 SERPL-SCNC: 27 MMOL/L (ref 20–33)
CREAT SERPL-MCNC: 0.73 MG/DL (ref 0.5–1.4)
EKG IMPRESSION: NORMAL
EOSINOPHIL # BLD AUTO: 0.18 K/UL (ref 0–0.51)
EOSINOPHIL NFR BLD: 2.5 % (ref 0–6.9)
ERYTHROCYTE [DISTWIDTH] IN BLOOD BY AUTOMATED COUNT: 46.5 FL (ref 35.9–50)
GLOBULIN SER CALC-MCNC: 1.8 G/DL (ref 1.9–3.5)
GLUCOSE SERPL-MCNC: 92 MG/DL (ref 65–99)
HCT VFR BLD AUTO: 45.9 % (ref 37–47)
HGB BLD-MCNC: 14.7 G/DL (ref 12–16)
IMM GRANULOCYTES # BLD AUTO: 0.02 K/UL (ref 0–0.11)
IMM GRANULOCYTES NFR BLD AUTO: 0.3 % (ref 0–0.9)
LYMPHOCYTES # BLD AUTO: 2.01 K/UL (ref 1–4.8)
LYMPHOCYTES NFR BLD: 28.2 % (ref 22–41)
MCH RBC QN AUTO: 31.6 PG (ref 27–33)
MCHC RBC AUTO-ENTMCNC: 32 G/DL (ref 33.6–35)
MCV RBC AUTO: 98.7 FL (ref 81.4–97.8)
MONOCYTES # BLD AUTO: 0.43 K/UL (ref 0–0.85)
MONOCYTES NFR BLD AUTO: 6 % (ref 0–13.4)
NEUTROPHILS # BLD AUTO: 4.46 K/UL (ref 2–7.15)
NEUTROPHILS NFR BLD: 62.7 % (ref 44–72)
NRBC # BLD AUTO: 0 K/UL
NRBC BLD-RTO: 0 /100 WBC
PLATELET # BLD AUTO: 246 K/UL (ref 164–446)
PMV BLD AUTO: 9.8 FL (ref 9–12.9)
POTASSIUM SERPL-SCNC: 4.3 MMOL/L (ref 3.6–5.5)
PROT SERPL-MCNC: 6.4 G/DL (ref 6–8.2)
RBC # BLD AUTO: 4.65 M/UL (ref 4.2–5.4)
SODIUM SERPL-SCNC: 140 MMOL/L (ref 135–145)
TROPONIN T SERPL-MCNC: <6 NG/L (ref 6–19)
WBC # BLD AUTO: 7.1 K/UL (ref 4.8–10.8)

## 2019-09-26 PROCEDURE — 71045 X-RAY EXAM CHEST 1 VIEW: CPT

## 2019-09-26 PROCEDURE — 96374 THER/PROPH/DIAG INJ IV PUSH: CPT

## 2019-09-26 PROCEDURE — 80053 COMPREHEN METABOLIC PANEL: CPT

## 2019-09-26 PROCEDURE — 85025 COMPLETE CBC W/AUTO DIFF WBC: CPT

## 2019-09-26 PROCEDURE — 93005 ELECTROCARDIOGRAM TRACING: CPT | Performed by: EMERGENCY MEDICINE

## 2019-09-26 PROCEDURE — 84484 ASSAY OF TROPONIN QUANT: CPT

## 2019-09-26 PROCEDURE — 99284 EMERGENCY DEPT VISIT MOD MDM: CPT

## 2019-09-26 PROCEDURE — 93005 ELECTROCARDIOGRAM TRACING: CPT

## 2019-09-26 PROCEDURE — 700111 HCHG RX REV CODE 636 W/ 250 OVERRIDE (IP): Performed by: EMERGENCY MEDICINE

## 2019-09-26 RX ORDER — KETOROLAC TROMETHAMINE 30 MG/ML
30 INJECTION, SOLUTION INTRAMUSCULAR; INTRAVENOUS ONCE
Status: COMPLETED | OUTPATIENT
Start: 2019-09-26 | End: 2019-09-26

## 2019-09-26 RX ORDER — AZITHROMYCIN 250 MG/1
250 TABLET, FILM COATED ORAL DAILY
Qty: 6 TAB | Refills: 0 | Status: SHIPPED | OUTPATIENT
Start: 2019-09-26 | End: 2019-10-01

## 2019-09-26 RX ADMIN — KETOROLAC TROMETHAMINE 30 MG: 30 INJECTION, SOLUTION INTRAMUSCULAR at 11:33

## 2019-09-26 NOTE — ED PROVIDER NOTES
"ED Provider Note    Scribed for Adam Simeon M.D. by Amanda Landeros. 9/26/2019, 11:08 AM.    Primary care provider: Fouzia Lawton D.O.  Means of arrival: Walk-in  History obtained from: Patient  History limited by: None    CHIEF COMPLAINT  Chief Complaint   Patient presents with   • Chest Pain   • Flu Like Symptoms     HPI  Madhavi Coffman is a 54 y.o. female who presents to the Emergency Department for mild central chest pain that began on 9/20. The patient states that she was exhausted 3 weeks ago and it has continued since. The patient has felt ill with \"flu-like symptoms\" since last week with associated sore throat and ear pain. The patient has associated diarrhea, shortness of breath, chills and fever with Tmax of 102 °F. She also has some mild pain in her left armpit since 9/21. She states that she smokes a pack a day. She used to smoke for 17 years. She then quit, began smoking again, and has been smoking for 3 years since.     REVIEW OF SYSTEMS  As above otherwise all other systems are negative    PAST MEDICAL HISTORY   has a past medical history of Allergy, Anxiety, Arthritis, Depression, Diverticulosis, Hemorrhoids, Hiatus hernia syndrome, Indigestion, LBP (low back pain) (9/7/12), Migraine without aura, with intractable migraine, so stated, without mention of status migrainosus, Obesity, Peripheral neuropathy, and Thoracic or lumbosacral neuritis or radiculitis, unspecified.    SURGICAL HISTORY   has a past surgical history that includes sigmoid colectomy (2004); gastric bypass laparoscopic; removal of ovary/tube(s) (2005); tonsillectomy (1986); cholecystectomy; other (1999); lumbar fusion posterior (11/4/2009); lumbar decompression (11/4/2009); gastric bypass laparoscopic (4/5/2010); hiatal hernia repair (4/5/2010); s i joint fusion (11/11/2010); reduction of large breast; lumbar fusion posterior (7/26/2011); lumbar decompression (7/26/2011); hardware removal ortho (7/26/2011); percut " implnt neuroelect,epidural (2012); percut implnt neuroelect,epidural (2012); inj,sacroiliac,anes/steroid (2012); inj,sacroiliac,anes/steroid (2012); thoracic laminectomy (2012); spinal cord stimulator (2012); inj,sacroiliac,anes/steroid (2012); inj,sacroiliac,anes/steroid (2012); neuro dest facet l/s w/ig sngl (3/29/2013); neuro dest facet l/s w/ig addl (3/29/2013); neuro dest facet l/s w/ig addl (3/29/2013); neuro dest facet l/s w/ig addl (3/29/2013); neuro dest facet l/s w/ig sngl (2013); neuro dest facet l/s w/ig addl (2013); neuro dest facet l/s w/ig addl (2013); inj,sacroiliac,anes/steroid (2013); inj,sacroiliac,anes/steroid (2013); and inj,sacroiliac,anes/steroid (10/18/2013).    SOCIAL HISTORY  Social History     Tobacco Use   • Smoking status: Former Smoker     Packs/day: 1.00     Years: 20.00     Pack years: 20.00     Types: Cigarettes     Last attempt to quit: 1997     Years since quittin.7   • Smokeless tobacco: Never Used   • Tobacco comment:    Substance Use Topics   • Alcohol use: Yes     Alcohol/week: 0.5 oz     Types: 1 Standard drinks or equivalent per week     Comment: 2 per week   • Drug use: No      Social History     Substance and Sexual Activity   Drug Use No     FAMILY HISTORY  Family History   Problem Relation Age of Onset   • Diabetes Father    • Hypertension Father    • Other Father         Migraine   • Heart Disease Maternal Grandfather    • Alcohol/Drug Paternal Grandfather    • Other Brother         Migraine   • Cancer Maternal Aunt         breast     CURRENT MEDICATIONS  Current Outpatient Medications:   •  baclofen (LIORESAL) 10 MG Tab, Take 1 Tab by mouth 3 times a day. (Patient not taking: Reported on 2019), Disp: 30 Tab, Rfl: 0  •  ondansetron (ZOFRAN) 4 MG Tab tablet, Take 1 Tab by mouth every four hours as needed for Nausea/Vomiting. (Patient not taking: Reported on 2018), Disp: 20 Tab, Rfl:  "0  •  topiramate (TOPAMAX) 100 MG Tab, TAKE 1 TABLET BY MOUTH IN THE EVENING (Patient not taking: Reported on 12/24/2018), Disp: 90 Tab, Rfl: 3  •  ALPRAZolam (XANAX PO), Take  by mouth., Disp: , Rfl:   •  TRAZODONE HCL PO, Take  by mouth., Disp: , Rfl:   •  amoxicillin-clavulanate (AUGMENTIN) 875-125 MG Tab, Take 1 Tab by mouth 2 times a day. (Patient not taking: Reported on 12/24/2018), Disp: 14 Tab, Rfl: 0  •  Sumatriptan-Naproxen Sodium (TREXIMET)  MG TABS, 1 tab at headache onset; repeat in 1 hour prn (Patient not taking: Reported on 12/24/2018), Disp: 27 Tab, Rfl: 3  •  oxycodone-acetaminophen (PERCOCET) 7.5-325 MG per tablet, Take 1-2 Tabs by mouth every four hours as needed., Disp: , Rfl:     ALLERGIES  Allergies   Allergen Reactions   • Cleocin [Clindamycin Hcl]      Stomach upset   • Neosporin [Neomycin-Bacitracin-Polymyxin] Vomiting and Swelling   • Sudafed [Pseudoephedrine Hcl] Rash   • Tape Contact Dermatitis     PHYSICAL EXAM  VITAL SIGNS: /96   Pulse 88   Temp 36.6 °C (97.9 °F) (Temporal)   Resp 18   Ht 1.651 m (5' 5\")   Wt 70.8 kg (156 lb 1.4 oz)   LMP 08/03/1999   SpO2 95%   BMI 25.97 kg/m²     Constitutional: Well developed, Well nourished, No acute distress, Non-toxic appearance.   HENT: Normocephalic, Atraumatic, Bilateral external ears normal, oropharynx moist, No oral exudates, Nose normal.   Eyes:conjunctiva is normal, there are no signs of exudate.   Neck: Supple, no meningeal signs.  Lymphatic: No lymphadenopathy noted.   Cardiovascular: Regular rate and rhythm without murmurs gallops or rubs.   Thorax & Lungs: Tender about left chest wall and into costochondral region, Crackles and diminished breath sounds to left lung base, No respiratory distress. Breathing comfortably. Lungs are clear to auscultation bilaterally, there are no wheezes no rales. Chest wall is nontender.  Abdomen: Soft, nontender, nondistended. Bowel sounds are present.   Skin: Warm, Dry, No erythema, "   Back: No tenderness, No CVA tenderness.  Musculoskeletal: Negative Haley's sign. Good range of motion in all major joints. No tenderness to palpation or major deformities noted. Intact distal pulses, no clubbing, no cyanosis, no edema,   Neurologic: Alert & oriented x 3, Moving all extremities. No gross abnormalities.    Psychiatric: Affect normal, Judgment normal, Mood normal.     LABS  Results for orders placed or performed during the hospital encounter of 09/26/19   CBC with Differential   Result Value Ref Range    WBC 7.1 4.8 - 10.8 K/uL    RBC 4.65 4.20 - 5.40 M/uL    Hemoglobin 14.7 12.0 - 16.0 g/dL    Hematocrit 45.9 37.0 - 47.0 %    MCV 98.7 (H) 81.4 - 97.8 fL    MCH 31.6 27.0 - 33.0 pg    MCHC 32.0 (L) 33.6 - 35.0 g/dL    RDW 46.5 35.9 - 50.0 fL    Platelet Count 246 164 - 446 K/uL    MPV 9.8 9.0 - 12.9 fL    Neutrophils-Polys 62.70 44.00 - 72.00 %    Lymphocytes 28.20 22.00 - 41.00 %    Monocytes 6.00 0.00 - 13.40 %    Eosinophils 2.50 0.00 - 6.90 %    Basophils 0.30 0.00 - 1.80 %    Immature Granulocytes 0.30 0.00 - 0.90 %    Nucleated RBC 0.00 /100 WBC    Neutrophils (Absolute) 4.46 2.00 - 7.15 K/uL    Lymphs (Absolute) 2.01 1.00 - 4.80 K/uL    Monos (Absolute) 0.43 0.00 - 0.85 K/uL    Eos (Absolute) 0.18 0.00 - 0.51 K/uL    Baso (Absolute) 0.02 0.00 - 0.12 K/uL    Immature Granulocytes (abs) 0.02 0.00 - 0.11 K/uL    NRBC (Absolute) 0.00 K/uL   Complete Metabolic Panel (CMP)   Result Value Ref Range    Sodium 140 135 - 145 mmol/L    Potassium 4.3 3.6 - 5.5 mmol/L    Chloride 106 96 - 112 mmol/L    Co2 27 20 - 33 mmol/L    Anion Gap 7.0 0.0 - 11.9    Glucose 92 65 - 99 mg/dL    Bun 12 8 - 22 mg/dL    Creatinine 0.73 0.50 - 1.40 mg/dL    Calcium 10.1 8.5 - 10.5 mg/dL    AST(SGOT) 21 12 - 45 U/L    ALT(SGPT) 10 2 - 50 U/L    Alkaline Phosphatase 100 (H) 30 - 99 U/L    Total Bilirubin 0.4 0.1 - 1.5 mg/dL    Albumin 4.6 3.2 - 4.9 g/dL    Total Protein 6.4 6.0 - 8.2 g/dL    Globulin 1.8 (L) 1.9 - 3.5 g/dL     A-G Ratio 2.6 g/dL   Troponin   Result Value Ref Range    Troponin T <6 6 - 19 ng/L   ESTIMATED GFR   Result Value Ref Range    GFR If African American >60 >60 mL/min/1.73 m 2    GFR If Non African American >60 >60 mL/min/1.73 m 2   EKG (NOW)   Result Value Ref Range    Report       [facility]    Test Date:  2019  Pt Name:    SINGH DUARTE               Department: ER  MRN:        [id number]                      Room:  Gender:     Female                       Technician: [id number]  :        1965                   Requested By:ER TRIAGE PROTOCOL  Order #:    [id number]                    Reading MD: REBECA TERVINO MD    Measurements  Intervals                                Axis  Rate:       83                           P:          32  AR:         140                          QRS:        0  QRSD:       102                          T:          23  QT:         404  QTc:        475    Interpretive Statements  SINUS RHYTHM  RSR' IN V1 OR V2, RIGHT VCD OR RVH  ABNRM R PROG, CONSIDER ASMI OR LEAD PLACEMENT  Compared to ECG 2012 17:00:52  Right ventricular hypertrophy now present  RSR' in V1 or V2 now present  Sinus bradycardia no longer present  no acute changes  Electronically Signed On 2019 13:13:48 PDT by REBECA TREVINO MD       All labs reviewed by me.  EKG interpreted by me as above.    RADIOLOGY  DX-CHEST-PORTABLE (1 VIEW)   Final Result      1.  There is no acute cardiopulmonary process.        The radiologist's interpretation of all radiological studies have been reviewed by me.    COURSE & MEDICAL DECISION MAKING  Pertinent Labs & Imaging studies reviewed. (See chart for details)    11:08 AM - Patient seen and examined at bedside. Patient will be treated with Toradol 30 mg. Ordered DX-Chest-Portable, CBC with Differential, CMP, Troponin, Estimated GFR, and EKG to evaluate her symptoms. The differential diagnoses include but are not limited to: pleurisy  versus upper respiratory infection    12:30 PM - I reviewed patient's lab and radiology results.    1:20 PM - I reevaluated the patient at bedside and informed her of the plan for discharge. I told her to return for new or worsening symptoms. She understands and agrees.    Decision Making:  Patient presents emerged part for evaluation for clinically this seems to be more pleuritic chest pain in nature.  Patient states the pain is been with her continuously for the past several days troponin is negative.  Chest x-ray shows no pneumonia but clinically she does have some crackles to the left side it could very well be pleurisy.  I will start the patient empirically on Zithromax just to cover for atypicals.  Patient was given Toradol with significant improvement to her symptoms are recommended OTC NSAIDs for treatment of her chest discomfort.  Patient is to follow-up with her primary care physician in 1 week for recheck return as needed.    The patient will return for new or worsening symptoms and is stable at the time of discharge.    The patient is referred to a primary physician for blood pressure management, diabetic screening, and for all other preventative health concerns.    DISPOSITION:  Patient will be discharged home in stable condition.    FOLLOW UP:  Fouzia Lawton D.O.  [address] [address]  [phone]    Schedule an appointment as soon as possible for a visit in 1 week  For re-check, Return if any symptoms worsen      OUTPATIENT MEDICATIONS:  Discharge Medication List as of 9/26/2019  1:16 PM      START taking these medications    Details   azithromycin (ZITHROMAX Z-MADISON) 250 MG Tab Take 1 Tab by mouth every day for 5 days. Dispense a z-pack use as directed, Disp-6 Tab, R-0, Print Rx Paper           FINAL IMPRESSION  1. Pleuritic chest pain    2. Upper respiratory tract infection, unspecified type          I, Amanda Landeros (Scribe), am scribing for, and in the presence of, Adam BOYER  MARGARET Simeon.    Electronically signed by: Amanda Landeros (Scribe), 9/26/2019    IAdam M.D. personally performed the services described in this documentation, as scribed by Amanda Landeros in my presence, and it is both accurate and complete. C    The note accurately reflects work and decisions made by me.  Adam Simeon  9/26/2019  5:01 PM

## 2020-10-05 ENCOUNTER — OFFICE VISIT (OUTPATIENT)
Dept: URGENT CARE | Facility: CLINIC | Age: 55
End: 2020-10-05
Payer: MEDICAID

## 2020-10-05 ENCOUNTER — APPOINTMENT (OUTPATIENT)
Dept: RADIOLOGY | Facility: IMAGING CENTER | Age: 55
End: 2020-10-05
Attending: NURSE PRACTITIONER
Payer: MEDICAID

## 2020-10-05 VITALS
RESPIRATION RATE: 14 BRPM | TEMPERATURE: 96.6 F | DIASTOLIC BLOOD PRESSURE: 60 MMHG | HEIGHT: 65 IN | HEART RATE: 79 BPM | WEIGHT: 158 LBS | SYSTOLIC BLOOD PRESSURE: 140 MMHG | BODY MASS INDEX: 26.33 KG/M2 | OXYGEN SATURATION: 94 %

## 2020-10-05 DIAGNOSIS — R51.9 ACUTE NONINTRACTABLE HEADACHE, UNSPECIFIED HEADACHE TYPE: ICD-10-CM

## 2020-10-05 DIAGNOSIS — M25.551 RIGHT HIP PAIN: ICD-10-CM

## 2020-10-05 DIAGNOSIS — M25.521 RIGHT ELBOW PAIN: ICD-10-CM

## 2020-10-05 DIAGNOSIS — S50.01XA CONTUSION OF RIGHT ELBOW, INITIAL ENCOUNTER: ICD-10-CM

## 2020-10-05 DIAGNOSIS — S09.90XA INJURY OF HEAD, INITIAL ENCOUNTER: ICD-10-CM

## 2020-10-05 DIAGNOSIS — S70.01XA CONTUSION OF RIGHT HIP, INITIAL ENCOUNTER: ICD-10-CM
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
## 2020-10-05 PROCEDURE — 99214 OFFICE O/P EST MOD 30 MIN: CPT | Performed by: NURSE PRACTITIONER

## 2020-10-05 PROCEDURE — 73080 X-RAY EXAM OF ELBOW: CPT | Mod: TC,RT | Performed by: NURSE PRACTITIONER

## 2020-10-05 PROCEDURE — 73501 X-RAY EXAM HIP UNI 1 VIEW: CPT | Mod: TC,RT | Performed by: NURSE PRACTITIONER

## 2020-10-05 ASSESSMENT — FIBROSIS 4 INDEX: FIB4 SCORE: 1.48

## 2020-10-06 ASSESSMENT — ENCOUNTER SYMPTOMS
LOSS OF CONSCIOUSNESS: 1
EYE REDNESS: 0
VISUAL CHANGE: 0
FALLS: 1
CHILLS: 0
NAUSEA: 0
SORE THROAT: 0
FEVER: 0
MYALGIAS: 0
VOMITING: 0
HEADACHES: 1
ROS SKIN COMMENTS: BRUISING
NUMBNESS: 0
SHORTNESS OF BREATH: 0
TINGLING: 0
DIZZINESS: 0
BOWEL INCONTINENCE: 0

## 2020-10-07 NOTE — PROGRESS NOTES
"Subjective:   Madhavi Coffman  is a 55 y.o. female who presents for Arm Pain (swollen, painful, fell down some stairs  x 2 days )        Fall  The accident occurred 2 days ago. Fall occurred: stairs. She fell from a height of 3 to 5 ft. She landed on carpet. There was no blood loss. The point of impact was the head, right elbow and right hip. The pain is present in the right elbow, right hip and head. The pain is at a severity of 8/10. The pain is moderate. The symptoms are aggravated by ambulation, use of injured limb, pressure on injury, sitting and movement. Associated symptoms include headaches and a loss of consciousness (uncertain ). Pertinent negatives include no bowel incontinence, fever, nausea, numbness, tingling, visual change or vomiting. She has tried acetaminophen and NSAID for the symptoms. The treatment provided no relief.     Review of Systems   Constitutional: Negative for chills and fever.   HENT: Negative for sore throat.    Eyes: Negative for redness.   Respiratory: Negative for shortness of breath.    Cardiovascular: Negative for chest pain.   Gastrointestinal: Negative for bowel incontinence, nausea and vomiting.   Genitourinary: Negative for dysuria.   Musculoskeletal: Positive for falls and joint pain. Negative for myalgias.   Skin: Negative for rash.        Bruising      Neurological: Positive for loss of consciousness (uncertain ) and headaches. Negative for dizziness, tingling and numbness.     Allergies   Allergen Reactions   • Cleocin [Clindamycin Hcl]      Stomach upset   • Neosporin [Neomycin-Bacitracin-Polymyxin] Vomiting and Swelling   • Sudafed [Pseudoephedrine Hcl] Rash   • Tape Contact Dermatitis      Objective:   /60   Pulse 79   Temp 35.9 °C (96.6 °F) (Temporal)   Resp 14   Ht 1.651 m (5' 5\")   Wt 71.7 kg (158 lb)   LMP 08/03/1999   SpO2 94%   BMI 26.29 kg/m²   Physical Exam  Vitals signs and nursing note reviewed.   Constitutional:       General: She is not " in acute distress.     Appearance: She is well-developed.   HENT:      Head: Normocephalic and atraumatic.      Right Ear: External ear normal.      Left Ear: External ear normal.      Nose: Nose normal.      Mouth/Throat:      Mouth: Mucous membranes are moist.   Eyes:      Conjunctiva/sclera: Conjunctivae normal.   Neck:      Musculoskeletal: Full passive range of motion without pain.      Meningeal: Brudzinski's sign and Kernig's sign absent.   Cardiovascular:      Rate and Rhythm: Normal rate.   Pulmonary:      Effort: Pulmonary effort is normal. No respiratory distress.      Breath sounds: Normal breath sounds.   Abdominal:      General: There is no distension.   Musculoskeletal:      Right elbow: She exhibits decreased range of motion, swelling and effusion. She exhibits no deformity. Tenderness found. Lateral epicondyle tenderness noted. No radial head and no medial epicondyle tenderness noted.      Right hip: She exhibits tenderness (Large contusion). She exhibits normal range of motion, normal strength, no swelling, no deformity and no laceration.        Arms:         Legs:    Skin:     General: Skin is warm and dry.   Neurological:      General: No focal deficit present.      Mental Status: She is alert and oriented to person, place, and time. Mental status is at baseline. She is not disoriented.      GCS: GCS eye subscore is 4. GCS verbal subscore is 5. GCS motor subscore is 6.      Cranial Nerves: No cranial nerve deficit.      Sensory: No sensory deficit.      Gait: Gait (gait at baseline) normal.      Deep Tendon Reflexes: Reflexes are normal and symmetric.   Psychiatric:         Judgment: Judgment normal.           Assessment/Plan:     1. Right elbow pain  DX-ELBOW-COMPLETE 3+ RIGHT   2. Right hip pain  DX-HIP-UNILATERAL-WITH PELVIS-1 VIEW RIGHT   3. Fall, initial encounter  REFERRAL TO ORTHOPEDICS   4. Injury of head, initial encounter  REFERRAL TO ORTHOPEDICS   5. Contusion of right elbow, initial  encounter     6. Contusion of right hip, initial encounter     7. Acute nonintractable headache, unspecified headache type  CT-HEAD W/O       Xray results   1.  There is mild diffuse right elbow swelling.  2.  There is no joint effusion or displaced fracture.  1.  No radiographic evidence of acute traumatic injury.    Patient is a 55-year-old female present with the stated above no fracture noted on x-ray.  Right elbow wrapped with an Ace bandage for compression encouraged to rest, ice, anti-inflammatories for pain.  Patient is uncertain if she had loss of consciousness she does have a slight headache however neuro exam unremarkable, no red flags were appreciated.  Will obtain CT imaging to ensure no subacute bleed is present.  Will patient follow-up with orthopedics for further evaluation and management if contusions do not improve and/or worsen.  Patient and/or guardian given precautionary s/sx that mandate immediate follow up and evaluation in the ED. Advised of risks of not doing so.  Side effects of the above medications discussed.   DDX, Supportive care, and indications for immediate follow-up discussed with patient.    Instructed to return to clinic or nearest emergency department if we are not available for any change in condition, further concerns, or worsening of symptoms.    The patient and/or guardian demonstrated a good understanding and agreed with the treatment plan.    Please note that this dictation was created using voice recognition software. I have made every reasonable attempt to correct obvious errors, but I expect that there are errors of grammar and possibly content that I did not discover before finalizing the note.

## 2020-10-15 ENCOUNTER — APPOINTMENT (OUTPATIENT)
Dept: RADIOLOGY | Facility: MEDICAL CENTER | Age: 55
End: 2020-10-15
Attending: NURSE PRACTITIONER
Payer: MEDICAID

## 2020-10-31 ENCOUNTER — OFFICE VISIT (OUTPATIENT)
Dept: URGENT CARE | Facility: CLINIC | Age: 55
End: 2020-10-31
Payer: MEDICAID

## 2020-10-31 ENCOUNTER — HOSPITAL ENCOUNTER (EMERGENCY)
Facility: MEDICAL CENTER | Age: 55
End: 2020-10-31
Attending: EMERGENCY MEDICINE
Payer: MEDICAID

## 2020-10-31 ENCOUNTER — HOSPITAL ENCOUNTER (OUTPATIENT)
Facility: MEDICAL CENTER | Age: 55
End: 2020-10-31
Payer: MEDICAID

## 2020-10-31 VITALS
WEIGHT: 158.95 LBS | SYSTOLIC BLOOD PRESSURE: 155 MMHG | BODY MASS INDEX: 26.48 KG/M2 | HEIGHT: 65 IN | HEART RATE: 75 BPM | RESPIRATION RATE: 16 BRPM | OXYGEN SATURATION: 95 % | TEMPERATURE: 98.4 F | DIASTOLIC BLOOD PRESSURE: 89 MMHG

## 2020-10-31 VITALS
OXYGEN SATURATION: 97 % | TEMPERATURE: 99.5 F | HEIGHT: 65 IN | DIASTOLIC BLOOD PRESSURE: 74 MMHG | RESPIRATION RATE: 16 BRPM | HEART RATE: 89 BPM | WEIGHT: 160 LBS | SYSTOLIC BLOOD PRESSURE: 126 MMHG | BODY MASS INDEX: 26.66 KG/M2

## 2020-10-31 DIAGNOSIS — N12 PYELONEPHRITIS: ICD-10-CM

## 2020-10-31 DIAGNOSIS — T18.128A OBSTRUCTION OF ESOPHAGUS DUE TO FOOD IMPACTION: ICD-10-CM

## 2020-10-31 DIAGNOSIS — M54.50 ACUTE LEFT-SIDED LOW BACK PAIN WITHOUT SCIATICA: ICD-10-CM

## 2020-10-31 DIAGNOSIS — R35.0 URINARY FREQUENCY: ICD-10-CM

## 2020-10-31 DIAGNOSIS — K22.2 ESOPHAGEAL STRICTURE: ICD-10-CM
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
## 2020-10-31 DIAGNOSIS — N30.90 CYSTITIS: ICD-10-CM

## 2020-10-31 LAB
ALBUMIN SERPL BCP-MCNC: 4.4 G/DL (ref 3.2–4.9)
ALBUMIN/GLOB SERPL: 1.9 G/DL
ALP SERPL-CCNC: 105 U/L (ref 30–99)
ALT SERPL-CCNC: 23 U/L (ref 2–50)
ANION GAP SERPL CALC-SCNC: 10 MMOL/L (ref 7–16)
APPEARANCE UR: CLEAR
APPEARANCE UR: NORMAL
AST SERPL-CCNC: 27 U/L (ref 12–45)
BACTERIA #/AREA URNS HPF: NEGATIVE /HPF
BASOPHILS # BLD AUTO: 0.3 % (ref 0–1.8)
BASOPHILS # BLD: 0.03 K/UL (ref 0–0.12)
BILIRUB SERPL-MCNC: 1 MG/DL (ref 0.1–1.5)
BILIRUB UR QL STRIP.AUTO: ABNORMAL
BILIRUB UR STRIP-MCNC: NORMAL MG/DL
BUN SERPL-MCNC: 20 MG/DL (ref 8–22)
CALCIUM SERPL-MCNC: 9.9 MG/DL (ref 8.5–10.5)
CHLORIDE SERPL-SCNC: 101 MMOL/L (ref 96–112)
CO2 SERPL-SCNC: 25 MMOL/L (ref 20–33)
COLOR UR AUTO: NORMAL
COLOR UR: ABNORMAL
CREAT SERPL-MCNC: 0.63 MG/DL (ref 0.5–1.4)
EOSINOPHIL # BLD AUTO: 0.25 K/UL (ref 0–0.51)
EOSINOPHIL NFR BLD: 2.8 % (ref 0–6.9)
EPI CELLS #/AREA URNS HPF: ABNORMAL /HPF
ERYTHROCYTE [DISTWIDTH] IN BLOOD BY AUTOMATED COUNT: 43.4 FL (ref 35.9–50)
GLOBULIN SER CALC-MCNC: 2.3 G/DL (ref 1.9–3.5)
GLUCOSE SERPL-MCNC: 91 MG/DL (ref 65–99)
GLUCOSE UR STRIP.AUTO-MCNC: NEGATIVE MG/DL
GLUCOSE UR STRIP.AUTO-MCNC: NEGATIVE MG/DL
HCT VFR BLD AUTO: 48.6 % (ref 37–47)
HGB BLD-MCNC: 15.8 G/DL (ref 12–16)
HYALINE CASTS #/AREA URNS LPF: ABNORMAL /LPF
IMM GRANULOCYTES # BLD AUTO: 0.03 K/UL (ref 0–0.11)
IMM GRANULOCYTES NFR BLD AUTO: 0.3 % (ref 0–0.9)
KETONES UR STRIP.AUTO-MCNC: 40 MG/DL
KETONES UR STRIP.AUTO-MCNC: 80 MG/DL
LEUKOCYTE ESTERASE UR QL STRIP.AUTO: ABNORMAL
LEUKOCYTE ESTERASE UR QL STRIP.AUTO: NORMAL
LYMPHOCYTES # BLD AUTO: 2.18 K/UL (ref 1–4.8)
LYMPHOCYTES NFR BLD: 24.3 % (ref 22–41)
MCH RBC QN AUTO: 31.3 PG (ref 27–33)
MCHC RBC AUTO-ENTMCNC: 32.5 G/DL (ref 33.6–35)
MCV RBC AUTO: 96.2 FL (ref 81.4–97.8)
MICRO URNS: ABNORMAL
MONOCYTES # BLD AUTO: 0.59 K/UL (ref 0–0.85)
MONOCYTES NFR BLD AUTO: 6.6 % (ref 0–13.4)
NEUTROPHILS # BLD AUTO: 5.89 K/UL (ref 2–7.15)
NEUTROPHILS NFR BLD: 65.7 % (ref 44–72)
NITRITE UR QL STRIP.AUTO: NEGATIVE
NITRITE UR QL STRIP.AUTO: POSITIVE
NRBC # BLD AUTO: 0 K/UL
NRBC BLD-RTO: 0 /100 WBC
PH UR STRIP.AUTO: 5 [PH] (ref 5–8)
PH UR STRIP.AUTO: 5.5 [PH] (ref 5–8)
PLATELET # BLD AUTO: 267 K/UL (ref 164–446)
PMV BLD AUTO: 10.5 FL (ref 9–12.9)
POTASSIUM SERPL-SCNC: 4.6 MMOL/L (ref 3.6–5.5)
PROT SERPL-MCNC: 6.7 G/DL (ref 6–8.2)
PROT UR QL STRIP: NEGATIVE MG/DL
PROT UR QL STRIP: NORMAL MG/DL
RBC # BLD AUTO: 5.05 M/UL (ref 4.2–5.4)
RBC # URNS HPF: ABNORMAL /HPF
RBC UR QL AUTO: NEGATIVE
RBC UR QL AUTO: NEGATIVE
SODIUM SERPL-SCNC: 136 MMOL/L (ref 135–145)
SP GR UR STRIP.AUTO: 1.03
SP GR UR STRIP.AUTO: 1.03
UROBILINOGEN UR STRIP-MCNC: 2 MG/DL
UROBILINOGEN UR STRIP.AUTO-MCNC: 1 MG/DL
WBC # BLD AUTO: 9 K/UL (ref 4.8–10.8)
WBC #/AREA URNS HPF: ABNORMAL /HPF

## 2020-10-31 PROCEDURE — 99284 EMERGENCY DEPT VISIT MOD MDM: CPT

## 2020-10-31 PROCEDURE — 81001 URINALYSIS AUTO W/SCOPE: CPT

## 2020-10-31 PROCEDURE — 96375 TX/PRO/DX INJ NEW DRUG ADDON: CPT

## 2020-10-31 PROCEDURE — 700105 HCHG RX REV CODE 258: Performed by: EMERGENCY MEDICINE

## 2020-10-31 PROCEDURE — 80053 COMPREHEN METABOLIC PANEL: CPT

## 2020-10-31 PROCEDURE — 85025 COMPLETE CBC W/AUTO DIFF WBC: CPT

## 2020-10-31 PROCEDURE — 81002 URINALYSIS NONAUTO W/O SCOPE: CPT | Performed by: NURSE PRACTITIONER

## 2020-10-31 PROCEDURE — 99214 OFFICE O/P EST MOD 30 MIN: CPT | Performed by: NURSE PRACTITIONER

## 2020-10-31 PROCEDURE — 700111 HCHG RX REV CODE 636 W/ 250 OVERRIDE (IP): Performed by: EMERGENCY MEDICINE

## 2020-10-31 PROCEDURE — 96365 THER/PROPH/DIAG IV INF INIT: CPT

## 2020-10-31 PROCEDURE — 87086 URINE CULTURE/COLONY COUNT: CPT

## 2020-10-31 RX ORDER — METOCLOPRAMIDE 10 MG/1
10 TABLET ORAL 4 TIMES DAILY
Qty: 60 TAB | Refills: 0 | Status: SHIPPED | OUTPATIENT
Start: 2020-10-31 | End: 2023-04-08

## 2020-10-31 RX ORDER — ONDANSETRON 2 MG/ML
4 INJECTION INTRAMUSCULAR; INTRAVENOUS ONCE
Status: COMPLETED | OUTPATIENT
Start: 2020-10-31 | End: 2020-10-31

## 2020-10-31 RX ORDER — CEFDINIR 300 MG/1
300 CAPSULE ORAL 2 TIMES DAILY
Qty: 20 CAP | Refills: 0 | Status: SHIPPED | OUTPATIENT
Start: 2020-10-31 | End: 2023-04-08

## 2020-10-31 RX ORDER — SODIUM CHLORIDE, SODIUM LACTATE, POTASSIUM CHLORIDE, CALCIUM CHLORIDE 600; 310; 30; 20 MG/100ML; MG/100ML; MG/100ML; MG/100ML
1000 INJECTION, SOLUTION INTRAVENOUS ONCE
Status: COMPLETED | OUTPATIENT
Start: 2020-10-31 | End: 2020-10-31

## 2020-10-31 RX ORDER — MORPHINE SULFATE 4 MG/ML
4 INJECTION, SOLUTION INTRAMUSCULAR; INTRAVENOUS ONCE
Status: COMPLETED | OUTPATIENT
Start: 2020-10-31 | End: 2020-10-31

## 2020-10-31 RX ORDER — METOCLOPRAMIDE HYDROCHLORIDE 5 MG/ML
10 INJECTION INTRAMUSCULAR; INTRAVENOUS ONCE
Status: COMPLETED | OUTPATIENT
Start: 2020-10-31 | End: 2020-10-31

## 2020-10-31 RX ADMIN — SODIUM CHLORIDE, POTASSIUM CHLORIDE, SODIUM LACTATE AND CALCIUM CHLORIDE 1000 ML: 600; 310; 30; 20 INJECTION, SOLUTION INTRAVENOUS at 11:15

## 2020-10-31 RX ADMIN — METOCLOPRAMIDE 10 MG: 5 INJECTION, SOLUTION INTRAMUSCULAR; INTRAVENOUS at 12:10

## 2020-10-31 RX ADMIN — ONDANSETRON 4 MG: 2 INJECTION INTRAMUSCULAR; INTRAVENOUS at 12:10

## 2020-10-31 RX ADMIN — MORPHINE SULFATE 4 MG: 4 INJECTION INTRAVENOUS at 12:10

## 2020-10-31 RX ADMIN — CEFTRIAXONE SODIUM 2 G: 2 INJECTION, POWDER, FOR SOLUTION INTRAMUSCULAR; INTRAVENOUS at 12:11

## 2020-10-31 ASSESSMENT — VISUAL ACUITY: OU: 1

## 2020-10-31 ASSESSMENT — ENCOUNTER SYMPTOMS
ABDOMINAL PAIN: 0
FEVER: 0
CARDIOVASCULAR NEGATIVE: 1
BACK PAIN: 1
VOMITING: 1
FLANK PAIN: 1
CONSTITUTIONAL NEGATIVE: 1

## 2020-10-31 ASSESSMENT — FIBROSIS 4 INDEX
FIB4 SCORE: 1.48
FIB4 SCORE: 1.48

## 2020-10-31 NOTE — ED NOTES
Pt discharged from ED. Verbalized understanding of instructions and prescriptions. No questions or concerns at this time. Pt walked out of department with steady gait. Respirations even and unlabored.

## 2020-10-31 NOTE — PROGRESS NOTES
Subjective:     Madhavi Coffman is a 55 y.o. female who presents for Other (haven't been able to keep any food or liquid down x 3 days and food gets stuck on her chest and low back pain by kidney)       Other  This is a new problem. The problem has been gradually worsening. Associated symptoms include vomiting. Pertinent negatives include no abdominal pain or fever.     Patient reports that 3 days ago she started to notice difficulty swallowing after trying to have breakfast.  Reports a sensation of food being stuck in her esophagus.  Has tried antacids and carbonated beverages, but reports that everything just comes back up.  Reports pain and tenderness in the middle of her chest.  Denies shortness of breath.  Reports a history of hiatal hernia with repair.  History of gastric bypass surgery.     Patient reporting that a few days ago she started to experience left flank and left middle back pain.  Reports urinary frequency.  Denies fever or chills.    Patient was screened prior to rooming and denied COVID-19 diagnosis or contact with a person who has been diagnosed or is suspected to have COVID-19. During this visit, appropriate PPE was worn, hand hygiene was performed, and the patient and any visitors were masked.     PMH:  has a past medical history of Allergy, Anxiety, Arthritis, Depression, Diverticulosis, Hemorrhoids, Hiatus hernia syndrome, Indigestion, LBP (low back pain) (9/7/12), Migraine without aura, with intractable migraine, so stated, without mention of status migrainosus, Obesity, Peripheral neuropathy, and Thoracic or lumbosacral neuritis or radiculitis, unspecified. She also has no past medical history of Breast cancer (HCC).    MEDS:   Current Outpatient Medications:   •  baclofen (LIORESAL) 10 MG Tab, Take 1 Tab by mouth 3 times a day. (Patient not taking: Reported on 6/2/2019), Disp: 30 Tab, Rfl: 0  •  ondansetron (ZOFRAN) 4 MG Tab tablet, Take 1 Tab by mouth every four hours as needed for  Nausea/Vomiting. (Patient not taking: Reported on 12/24/2018), Disp: 20 Tab, Rfl: 0  •  topiramate (TOPAMAX) 100 MG Tab, TAKE 1 TABLET BY MOUTH IN THE EVENING (Patient not taking: Reported on 12/24/2018), Disp: 90 Tab, Rfl: 3  •  ALPRAZolam (XANAX PO), Take  by mouth., Disp: , Rfl:   •  TRAZODONE HCL PO, Take  by mouth., Disp: , Rfl:   •  amoxicillin-clavulanate (AUGMENTIN) 875-125 MG Tab, Take 1 Tab by mouth 2 times a day. (Patient not taking: Reported on 12/24/2018), Disp: 14 Tab, Rfl: 0  •  Sumatriptan-Naproxen Sodium (TREXIMET)  MG TABS, 1 tab at headache onset; repeat in 1 hour prn (Patient not taking: Reported on 12/24/2018), Disp: 27 Tab, Rfl: 3  •  oxycodone-acetaminophen (PERCOCET) 7.5-325 MG per tablet, Take 1-2 Tabs by mouth every four hours as needed., Disp: , Rfl:     ALLERGIES:   Allergies   Allergen Reactions   • Cleocin [Clindamycin Hcl]      Stomach upset   • Neosporin [Neomycin-Bacitracin-Polymyxin] Vomiting and Swelling   • Sudafed [Pseudoephedrine Hcl] Rash   • Tape Contact Dermatitis     SURGHX:   Past Surgical History:   Procedure Laterality Date   • INJ,SACROILIAC,ANES/STEROID  10/18/2013    Performed by Sonny Archuleta D.O. at [facility]   • INJ,SACROILIAC,ANES/STEROID  8/16/2013    Performed by Sonny Archuleta D.O. at [facility]   • INJ,SACROILIAC,ANES/STEROID  8/16/2013    Performed by Snony Archuleta D.O. at [facility]   • NEURO DEST FACET L/S W/IG SNGL  4/19/2013    Performed by Sonny Archuleta D.O. at [facility]   • NEURO DEST FACET L/S W/IG ADDL  4/19/2013    Performed by Sonny Archuleta D.O. at [facility]   • NEURO DEST FACET L/S W/IG ADDL  4/19/2013    Performed by Sonny Archuleta D.O. at [facility]ERY [facility]   • NEURO DEST FACET L/S W/IG SNGL  3/29/2013    Performed by Sonny Archuleta D.O. at [facility]   • NEURO DEST FACET L/S W/IG ADDL  3/29/2013    Performed by  Sonny Jones D.O. at [facility]   • NEURO DEST FACET L/S W/IG ADDL  3/29/2013    Performed by Sonny Jones D.O. at [facility]   • NEURO DEST FACET L/S W/IG ADDL  3/29/2013    Performed by Sonny Jonse D.O. at [facility]   • INJ,SACROILIAC,ANES/STEROID  12/28/2012    Performed by Sonny Jones D.O. at [facility]   • INJ,SACROILIAC,ANES/STEROID  11/9/2012    Performed by Sonny Jones D.O. at [facility]   • THORACIC LAMINECTOMY  9/18/2012    Performed by King Leal M.D. at [facility]   • SPINAL CORD STIMULATOR  9/18/2012    Performed by King Leal M.D. at [facility]   • INJ,SACROILIAC,ANES/STEROID  7/27/2012    Performed by SONNY JONES at [facility]   • INJ,SACROILIAC,ANES/STEROID  7/27/2012    Performed by SONNY JONES at [facility]   • PB PERCUT IMPLNT NEUROELECT,EPIDURAL  6/22/2012    Performed by SONNY JONES at [facility]   • PB PERCUT IMPLNT NEUROELECT,EPIDURAL  6/22/2012    Performed by SONNY JONES at [facility]   • LUMBAR FUSION POSTERIOR  7/26/2011    Performed by DANIAL KABA at SURGERY [facility]S   • LUMBAR DECOMPRESSION  7/26/2011    Performed by DANIAL KABA at [facility]   • HARDWARE REMOVAL ORTHO  7/26/2011    Performed by DANIAL KABA at [facility]   • S I JOINT FUSION  11/11/2010    Performed by DANIAL KABA at [facility]   • GASTRIC BYPASS LAPAROSCOPIC  4/5/2010    Performed by GANSER, JOHN H at [facility]   • HIATAL HERNIA REPAIR  4/5/2010    Performed by GANSER, JOHN H at [facility] OR[facility]   • LUMBAR FUSION POSTERIOR  11/4/2009    Performed by DANIAL KABA at [facility]   • LUMBAR DECOMPRESSION  11/4/2009    Performed by DANIAL KABA at [facility]   • PB REMOVAL OF  "OVARY/TUBE(S)  2005    left   • SIGMOID COLECTOMY  2004   • OTHER  1999    lasik surgery   • TONSILLECTOMY  1986   • CHOLECYSTECTOMY     • GASTRIC BYPASS LAPAROSCOPIC      x2   • PB REDUCTION OF LARGE BREAST       SOCHX:  reports that she quit smoking about 23 years ago. Her smoking use included cigarettes. She has a 20.00 pack-year smoking history. She has never used smokeless tobacco. She reports current alcohol use of about 0.5 oz of alcohol per week. She reports that she does not use drugs.     FH: Reviewed with patient, not pertinent to this visit.    Review of Systems   Constitutional: Negative.  Negative for fever.   Cardiovascular: Negative.    Gastrointestinal: Positive for vomiting. Negative for abdominal pain.   Genitourinary: Positive for flank pain (Left) and frequency.   Musculoskeletal: Positive for back pain (Left).        Substernal chest wall pain   All other systems reviewed and are negative.    Additional details per HPI.      Objective:     /74 (BP Location: Left arm, Patient Position: Sitting, BP Cuff Size: Adult long)   Pulse 89   Temp 37.5 °C (99.5 °F) (Temporal)   Resp 16   Ht 1.651 m (5' 5\")   Wt 72.6 kg (160 lb)   LMP 08/03/1999   SpO2 97%   BMI 26.63 kg/m²     Physical Exam  Vitals signs reviewed.   Constitutional:       General: She is not in acute distress.     Appearance: She is well-developed. She is not ill-appearing or toxic-appearing.   HENT:      Head: Normocephalic.      Right Ear: External ear normal.      Left Ear: External ear normal.      Nose: Nose normal.   Eyes:      General: Vision grossly intact.      Extraocular Movements: Extraocular movements intact.      Conjunctiva/sclera: Conjunctivae normal.   Neck:      Musculoskeletal: Normal range of motion.   Cardiovascular:      Rate and Rhythm: Normal rate and regular rhythm.      Heart sounds: Normal heart sounds.   Pulmonary:      Effort: Pulmonary effort is normal. No respiratory distress.      Breath " sounds: Normal breath sounds. No decreased breath sounds.   Chest:      Chest wall: Tenderness (Substernal) present.   Abdominal:      General: Bowel sounds are normal.      Palpations: Abdomen is soft.      Tenderness: There is no abdominal tenderness. There is left CVA tenderness.   Musculoskeletal: Normal range of motion.         General: No deformity.   Skin:     General: Skin is warm and dry.      Coloration: Skin is not pale.   Neurological:      Mental Status: She is alert and oriented to person, place, and time.      Sensory: No sensory deficit.      Motor: No weakness.      Coordination: Coordination normal.   Psychiatric:         Behavior: Behavior normal. Behavior is cooperative.     UA cloudy, orange, with moderate bilirubin, ketones, trace protein, urobilinogen, nitrites, and trace LE.  Left CVA tenderness.    Assessment/Plan:     1. Urinary frequency  - POCT Urinalysis    2. Acute left-sided low back pain without sciatica    3. Pyelonephritis  - URINE CULTURE(NEW); Future    4. Obstruction of esophagus due to food impaction    Patient referred to the ER for further evaluation and treatment. Concern for food bolus in esophagus. Patient has poor PO intake and it is doubtful that she would be able to take PO antibiotics for her pyelonephritis without the esophageal obstruction being addressed. Patient going to the [facility] ER.    All questions answered. Patient agrees with the plan of care.

## 2020-10-31 NOTE — ED NOTES
Med rec updated and complete . Allergies reviewed.  Pt denies taking medications.  Home pharmacy [facility]

## 2020-10-31 NOTE — ED NOTES
Pt able to drink juice that was provided for her per MD. Pt denied any issues with swallowing. Denied any n/v at this time.

## 2020-10-31 NOTE — ED PROVIDER NOTES
ED Provider Note    Scribed for Raffy De La Cruz M.D. by Pricila Carpenter. 10/31/2020  10:38 AM    Primary care provider: Fouzia Lawton D.O.  Means of arrival: Walk in  History obtained from: Patient  History limited by: None    CHIEF COMPLAINT  •  Dysphagia     HPI  Madhavi Coffman is a 55 y.o. female who presents to the Emergency Department for dysphagia with an onset of today. The patient has a prior history of a hiatal hernia. She has experienced dysphagia intermittently which has gradually worsened. Three days ago, she had difficulty swallowing eggs, felt as though the eggs were obstructing her esophagus but was able to throw the eggs up. Since this episode, she continues having difficulty swallowing when drinking and eating regardless of the food texture. At this time, she does not feel as though her esophagus is obstructed. She is not followed by GI nor has she completed an EGD/dilatation in the past. In addition, she does complain of low back and urinary frequency. Negative for recent illness, cough, difficulty breathing, chest pain, nausea or abdominal pain.      REVIEW OF SYSTEMS  Pertinent negatives include no recent illness, cough, difficulty breathing, chest pain, nausea or abdominal pain. As above, all other systems reviewed and are negative.   See HPI for further details.       PAST MEDICAL HISTORY  Patient has a past medical history of Allergy, Anxiety, Arthritis, Depression, Diverticulosis, Hemorrhoids, Hiatus hernia syndrome, Indigestion, LBP (low back pain) (9/7/12), Migraine without aura, with intractable migraine, so stated, without mention of status migrainosus, Obesity, Peripheral neuropathy, and Thoracic or lumbosacral neuritis or radiculitis, unspecified.      SURGICAL HISTORY  Patient has a past surgical history that includes sigmoid colectomy (2004); gastric bypass laparoscopic; removal of ovary/tube(s) (2005); tonsillectomy (1986); cholecystectomy; other (1999); lumbar fusion posterior  (2009); lumbar decompression (2009); gastric bypass laparoscopic (2010); hiatal hernia repair (2010); s i joint fusion (2010); reduction of large breast; lumbar fusion posterior (2011); lumbar decompression (2011); hardware removal ortho (2011); percut implnt neuroelect,epidural (2012); percut implnt neuroelect,epidural (2012); inj,sacroiliac,anes/steroid (2012); inj,sacroiliac,anes/steroid (2012); thoracic laminectomy (2012); spinal cord stimulator (2012); inj,sacroiliac,anes/steroid (2012); inj,sacroiliac,anes/steroid (2012); neuro dest facet l/s w/ig sngl (3/29/2013); neuro dest facet l/s w/ig addl (3/29/2013); neuro dest facet l/s w/ig addl (3/29/2013); neuro dest facet l/s w/ig addl (3/29/2013); neuro dest facet l/s w/ig sngl (2013); neuro dest facet l/s w/ig addl (2013); neuro dest facet l/s w/ig addl (2013); inj,sacroiliac,anes/steroid (2013); inj,sacroiliac,anes/steroid (2013); and inj,sacroiliac,anes/steroid (10/18/2013).      SOCIAL HISTORY  Social History     Tobacco Use   • Smoking status: Former Smoker     Packs/day: 1.00     Years: 20.00     Pack years: 20.00     Types: Cigarettes     Quit date: 1997     Years since quittin.8   • Smokeless tobacco: Never Used   • Tobacco comment:    Substance Use Topics   • Alcohol use: Yes     Alcohol/week: 0.5 oz     Types: 1 Standard drinks or equivalent per week     Comment: 2 per week   • Drug use: No      Social History     Substance and Sexual Activity   Drug Use No       FAMILY HISTORY  Family History   Problem Relation Age of Onset   • Diabetes Father    • Hypertension Father    • Other Father         Migraine   • Heart Disease Maternal Grandfather    • Alcohol/Drug Paternal Grandfather    • Other Brother         Migraine   • Cancer Maternal Aunt         breast       CURRENT MEDICATIONS  Home Medications     Reviewed by Andrei Cabrera R.N.  "(Registered Nurse) on 10/31/20 at [facility]  Med List Status: Partial   Medication Last Dose Status   ALPRAZolam (XANAX PO)  Active   amoxicillin-clavulanate (AUGMENTIN) 875-125 MG Tab  Active   baclofen (LIORESAL) 10 MG Tab  Active   ondansetron (ZOFRAN) 4 MG Tab tablet  Active   oxycodone-acetaminophen (PERCOCET) 7.5-325 MG per tablet  Active   Sumatriptan-Naproxen Sodium (TREXIMET)  MG TABS  Active   topiramate (TOPAMAX) 100 MG Tab  Active   TRAZODONE HCL PO  Active                ALLERGIES  Allergies   Allergen Reactions   • Cleocin [Clindamycin Hcl]      Stomach upset   • Neosporin [Neomycin-Bacitracin-Polymyxin] Vomiting and Swelling   • Sudafed [Pseudoephedrine Hcl] Rash   • Tape Contact Dermatitis       PHYSICAL EXAM  VITAL SIGNS: BP (!) 170/114   Pulse (!) 120   Temp 36.5 °C (97.7 °F) (Temporal)   Resp 16   Ht 1.651 m (5' 5\")   Wt 72.1 kg (158 lb 15.2 oz)   LMP 08/03/1999   SpO2 98%   BMI 26.45 kg/m²     Constitutional: Well developed, Well nourished, No acute distress, Non-toxic appearance.   HENT: Normocephalic, Atraumatic, Bilateral external ears normal, Oropharynx is clear mucous membranes are dry. No oral exudates or nasal discharge.   Eyes: Pupils are equal round and reactive, EOMI, Conjunctiva normal, No discharge.   Neck: Normal range of motion, No tenderness, Supple, No stridor. No meningismus.  Lymphatic: No lymphadenopathy noted.   Cardiovascular: Tachycardic and regular rhythm without murmur rub or gallop.  Thorax & Lungs: Clear breath sounds bilaterally without wheezes, rhonchi or rales. There is no chest wall tenderness.   Abdomen: Soft non-tender non-distended. There is no rebound or guarding. No organomegaly is appreciated. Bowel sounds are normal.  Skin: Normal without rash.   Back: Bilateral CVA tenderness.   Extremities: Intact distal pulses, No edema, No tenderness, No cyanosis, No clubbing. Capillary refill is less than 2 seconds.  Musculoskeletal: Good range of motion in all " major joints. No tenderness to palpation or major deformities noted.   Neurologic: Alert & oriented x 3, Normal motor function, Normal sensory function, No focal deficits noted. Reflexes are normal.  Psychiatric: Affect normal, Judgment normal, Mood normal. There is no suicidal ideation or patient reported hallucinations.       DIAGNOSTIC STUDIES / PROCEDURES    LABS  Labs Reviewed   URINALYSIS,CULTURE IF INDICATED - Abnormal; Notable for the following components:       Result Value    Ketones 80 (*)     Bilirubin Small (*)     Leukocyte Esterase Trace (*)     All other components within normal limits   CBC WITH DIFFERENTIAL - Abnormal; Notable for the following components:    Hematocrit 48.6 (*)     MCHC 32.5 (*)     All other components within normal limits   COMP METABOLIC PANEL - Abnormal; Notable for the following components:    Alkaline Phosphatase 105 (*)     All other components within normal limits   URINE MICROSCOPIC (W/UA) - Abnormal; Notable for the following components:    Hyaline Cast 3-5 (*)     All other components within normal limits   ESTIMATED GFR      All labs reviewed by me.      COURSE & MEDICAL DECISION MAKING  Nursing notes, VS, PMSFHx reviewed in chart.    11:12 AM Patient seen and examined at bedside. Ordered for laboratory testing to evaluate. Patient was treated with Rocephin 2 g IV, Morphine 4 mg IV, Zofran 4 mg IV and LR infusion for her symptoms.      11:49 AM Patient will be treated with Reglan 10 mg IV and we will start oral fluid challenge when we can    Laboratory evaluation reveals ketosis of urine with 2-5 white cells per high-power field and no bacteria but she was having fever and vomiting and I am concerned about early pyelonephritis.  No leukocytosis or shift is noted    12:10 PM On repeat evaluation, lab results were discussed with the patient. PO challenge was completed. Recommended she take small sips. Patient has not received Zofran or Reglan. Plan to discontinue Zofran  and only administer Reglan.    Discharge plan was discussed with the patient and includes following up with GI for an outpatient work up of dysphagia.      The patient will return for new or persisting symptoms including if she is unable to tolerate fluids or solids.  The patient verbalizes understanding and will comply.  Patient is stable at the time of discharge.  Vital signs were reviewed.     HYDRATION: Based on the patient's presentation of Dehydration and Inability to take oral fluids the patient was given IV fluids. IV Hydration was used because oral hydration was not adequate alone. Upon recheck following hydration, the patient is feeling better hydrated.  Heart rate came down to 81 from 120 and she was subsequently able to tolerate oral fluid challenge and believes she can take oral medication for her acute cystitis    Patient has had high blood pressure while in the emergency department, felt likely secondary to medical condition. Counseled patient to monitor blood pressure at home and follow up with primary care physician.       DISPOSITION  Patient will be discharged home in stable condition.  Patient understands her plan of care discharged in stable condition    FINAL IMPRESSION  1. Esophageal stricture    2. Cystitis         Pricila GLASS ([employer]), am scribing for, and in the presence of, Raffy De La Cruz M.D.    Electronically signed by: Pricila Carpenter ([employer]), 10/31/2020    Raffy GLASS M.D. personally performed the services described in this documentation, as scribed by Pricila Carpenter in my presence, and it is both accurate and complete. C.    The note accurately reflects work and decisions made by me.  Raffy De La Cruz M.D.  10/31/2020  1:37 PM

## 2020-10-31 NOTE — DISCHARGE INSTRUCTIONS
Please attend to a soft diet and avoid meats and anything that might get stuck in your esophagus and return if any obstruction occurs    You will likely need endoscopy with dilatation of the esophagus by GI consultants.  Please follow-up with them and call the office on Monday let them know that you are in the emergency department

## 2020-10-31 NOTE — ED TRIAGE NOTES
"Chief Complaint   Patient presents with   • Sent from Urgent Care     reports she hasen't been able to keep any food or liquid down x 3 days and food gets stuck in her chest. also c/o low back pain by kidney   • Vomiting      Has the sensation of something being stuck in esophagus   • Low Back Pain     Pt to triage for above. Hx of a hiatus hernia. Had corrected 10 years ago. Also hx of gastric bypass 20 years ago. Has been taking OTC meds without relief.     Denies covid s/sx, denies travel or contact with it.     BP (!) 170/114   Pulse (!) 120   Temp 36.5 °C (97.7 °F) (Temporal)   Resp 16   Ht 1.651 m (5' 5\")   Wt 72.1 kg (158 lb 15.2 oz)   LMP 08/03/1999   SpO2 98%   BMI 26.45 kg/m²     "

## 2020-11-01 DIAGNOSIS — N12 PYELONEPHRITIS: ICD-10-CM

## 2020-11-04 LAB
BACTERIA UR CULT: NORMAL
SIGNIFICANT IND 70042: NORMAL
SITE SITE: NORMAL
SOURCE SOURCE: NORMAL

## 2020-11-18 ENCOUNTER — HOSPITAL ENCOUNTER (OUTPATIENT)
Dept: LAB | Facility: MEDICAL CENTER | Age: 55
End: 2020-11-18
Attending: INTERNAL MEDICINE
Payer: MEDICAID

## 2020-11-18 LAB — HCV AB SER QL: NORMAL

## 2020-11-18 PROCEDURE — 86803 HEPATITIS C AB TEST: CPT

## 2020-11-18 PROCEDURE — 36415 COLL VENOUS BLD VENIPUNCTURE: CPT

## 2020-12-07 ENCOUNTER — HOSPITAL ENCOUNTER (OUTPATIENT)
Dept: RADIOLOGY | Facility: MEDICAL CENTER | Age: 55
End: 2020-12-07
Attending: INTERNAL MEDICINE
Payer: MEDICAID

## 2020-12-07 DIAGNOSIS — R13.19 OTHER DYSPHAGIA: ICD-10-CM

## 2020-12-07 PROCEDURE — 74248 X-RAY SM INT F-THRU STD: CPT

## 2020-12-07 PROCEDURE — 700117 HCHG RX CONTRAST REV CODE 255: Performed by: INTERNAL MEDICINE

## 2020-12-07 RX ADMIN — BARIUM SULFATE 700 MG: 700 TABLET ORAL at 12:17

## 2021-07-31 ENCOUNTER — OFFICE VISIT (OUTPATIENT)
Dept: URGENT CARE | Facility: CLINIC | Age: 56
End: 2021-07-31
Payer: MEDICAID

## 2021-07-31 ENCOUNTER — APPOINTMENT (OUTPATIENT)
Dept: RADIOLOGY | Facility: IMAGING CENTER | Age: 56
End: 2021-07-31
Attending: NURSE PRACTITIONER
Payer: MEDICAID

## 2021-07-31 VITALS
SYSTOLIC BLOOD PRESSURE: 130 MMHG | TEMPERATURE: 97.2 F | WEIGHT: 174.4 LBS | HEART RATE: 86 BPM | RESPIRATION RATE: 16 BRPM | DIASTOLIC BLOOD PRESSURE: 84 MMHG | OXYGEN SATURATION: 98 % | BODY MASS INDEX: 29.06 KG/M2 | HEIGHT: 65 IN

## 2021-07-31 DIAGNOSIS — S92.351A CLOSED DISPLACED FRACTURE OF FIFTH METATARSAL BONE OF RIGHT FOOT, INITIAL ENCOUNTER: ICD-10-CM

## 2021-07-31 DIAGNOSIS — M79.671 RIGHT FOOT PAIN: ICD-10-CM

## 2021-07-31 PROCEDURE — 73630 X-RAY EXAM OF FOOT: CPT | Mod: TC,RT | Performed by: NURSE PRACTITIONER

## 2021-07-31 PROCEDURE — 99214 OFFICE O/P EST MOD 30 MIN: CPT | Performed by: NURSE PRACTITIONER

## 2021-07-31 RX ORDER — IBUPROFEN 800 MG/1
800 TABLET ORAL EVERY 8 HOURS PRN
Qty: 30 TABLET | Refills: 0 | Status: SHIPPED | OUTPATIENT
Start: 2021-07-31 | End: 2024-02-07

## 2021-07-31 ASSESSMENT — ENCOUNTER SYMPTOMS
CONSTITUTIONAL NEGATIVE: 1
NEUROLOGICAL NEGATIVE: 1

## 2021-07-31 ASSESSMENT — FIBROSIS 4 INDEX: FIB4 SCORE: 1.18

## 2021-07-31 ASSESSMENT — VISUAL ACUITY: OU: 1

## 2021-07-31 NOTE — PROGRESS NOTES
Subjective:     Madhavi Coffamn is a 56 y.o. female who presents for Foot Injury (pt has (R) swollen and painful foot x 24 hrs ago )       Foot Problem  This is a new problem. The problem has been gradually worsening. Pertinent negatives include no rash.     Patient reports that 24 hours ago, she was at home when she tripped over a cable with her right foot.  Fell forward.  Reports gradually worsening pain of the right foot.  It is swollen and tender.  Able to ambulate but painful.  Treatment included ibuprofen.    Patient was screened prior to rooming and denied COVID-19 diagnosis or contact with a person who has been diagnosed or is suspected to have COVID-19. During this visit, appropriate PPE was worn, hand hygiene was performed, and the patient and any visitors were masked.     PMH:  has a past medical history of Allergy, Anxiety, Arthritis, Depression, Diverticulosis, Hemorrhoids, Hiatus hernia syndrome, Indigestion, LBP (low back pain) (9/7/12), Migraine without aura, with intractable migraine, so stated, without mention of status migrainosus, Obesity, Peripheral neuropathy, and Thoracic or lumbosacral neuritis or radiculitis, unspecified. She also has no past medical history of Breast cancer (HCC).    MEDS:   Current Outpatient Medications:   •  ibuprofen (MOTRIN) 800 MG Tab, Take 1 tablet by mouth every 8 hours as needed for Moderate Pain, Fever or Inflammation., Disp: 30 tablet, Rfl: 0  •  cefdinir (OMNICEF) 300 MG Cap, Take 1 Cap by mouth 2 times a day. (Patient not taking: Reported on 7/31/2021), Disp: 20 Cap, Rfl: 0  •  metoclopramide (REGLAN) 10 MG Tab, Take 1 Tab by mouth 4 times a day. (Patient not taking: Reported on 7/31/2021), Disp: 60 Tab, Rfl: 0  •  baclofen (LIORESAL) 10 MG Tab, Take 1 Tab by mouth 3 times a day. (Patient not taking: Reported on 10/31/2020), Disp: 30 Tab, Rfl: 0  •  ondansetron (ZOFRAN) 4 MG Tab tablet, Take 1 Tab by mouth every four hours as needed for Nausea/Vomiting.  (Patient not taking: Reported on 10/31/2020), Disp: 20 Tab, Rfl: 0  •  topiramate (TOPAMAX) 100 MG Tab, TAKE 1 TABLET BY MOUTH IN THE EVENING (Patient not taking: Reported on 12/24/2018), Disp: 90 Tab, Rfl: 3  •  amoxicillin-clavulanate (AUGMENTIN) 875-125 MG Tab, Take 1 Tab by mouth 2 times a day. (Patient not taking: Reported on 10/31/2020), Disp: 14 Tab, Rfl: 0    ALLERGIES:   Allergies   Allergen Reactions   • Cleocin [Clindamycin Hcl]      Stomach upset   • Neosporin [Neomycin-Bacitracin-Polymyxin] Vomiting and Swelling   • Sudafed [Pseudoephedrine Hcl] Rash   • Tape Contact Dermatitis     SURGHX:   Past Surgical History:   Procedure Laterality Date   • INJ,SACROILIAC,ANES/STEROID  10/18/2013    Performed by Sonny Archuleta D.O. at [facility]   • INJ,SACROILIAC,ANES/STEROID  8/16/2013    Performed by Sonny Archuleta D.O. at [facility]   • INJ,SACROILIAC,ANES/STEROID  8/16/2013    Performed by Sonny Archuleta D.O. at [facility]   • NEURO DEST FACET L/S W/IG SNGL  4/19/2013    Performed by Sonny Archuleta D.O. at [facility]   • NEURO DEST FACET L/S W/IG ADDL  4/19/2013    Performed by Sonny Archuleta D.O. at [facility]   • NEURO DEST FACET L/S W/IG ADDL  4/19/2013    Performed by Sonny Archuleta D.O. at [facility]   • NEURO DEST FACET L/S W/IG SNGL  3/29/2013    Performed by Sonny Archuleta D.O. at [facility]   • NEURO DEST FACET L/S W/IG ADDL  3/29/2013    Performed by Sonny Archuleta D.O. at [facility]   • NEURO DEST FACET L/S W/IG ADDL  3/29/2013    Performed by Sonny Archuleta D.O. at [facility]   • NEURO DEST FACET L/S W/IG ADDL  3/29/2013    Performed by Son[facility] Archuleta D.O. at [facility]   • INJ,SACROILIAC,ANES/STEROID  12/28/2012    Performed by Sonny Archuleta D.O. at [facility]   • INJ,SACROILIAC,ANES/STEROID   11/9/2012    Performed by Sonny Jones D.O. at [facility]   • THORACIC LAMINECTOMY  9/18/2012    Performed by King Leal M.D. at [facility]   • SPINAL CORD STIMULATOR  9/18/2012    Performed by King Leal M.D. at [facility]   • INJ,SACROILIAC,ANES/STEROID  7/27/2012    Performed by SONNY JONES at [facility]   • INJ,SACROILIAC,ANES/STEROID  7/27/2012    Performed by SONNY JONES at [facility]   • PB PERCUT IMPLNT NEUROELECT,EPIDURAL  6/22/2012    Performed by SONNY JONES at [facility]   • PB PERCUT IMPLNT NEUROELECT,EPIDURAL  6/22/2012    Performed by SONNY JONES at [facility]   • LUMBAR FUSION POSTERIOR  7/26/2011    Performed by DANIAL KABA at [facility]   • LUMBAR DECOMPRESSION  7/26/2011    Performed by DANIAL KABA at [facility]   • HARDWARE REMOVAL ORTHO  7/26/2011    Performed by DANIAL KABA at [facility]   • S I JOINT FUSION  11/11/2010    Performed by DANIAL KABA at [facility]   • GASTRIC BYPASS LAPAROSCOPIC  4/5/2010    Performed by GANSER, JOHN H at [facility]   • HIATAL HERNIA REPAIR  4/5/2010    Performed by GANSER, JOHN H at [facility]   • LUMBAR FUSION POSTERIOR  11/4/2009    Performed by DANIAL KABA at [facility]   • LUMBAR DECOMPRESSION  11/4/2009    Performed by DANIAL KABA at [facility]   • PB REMOVAL OF OVARY/TUBE(S)  2005    left   • SIGMOID COLECTOMY  2004   • OTHER  1999    lasik surgery   • TONSILLECTOMY  1986   • CHOLECYSTECTOMY     • GASTRIC BYPASS LAPAROSCOPIC      x2   • [place] REDUCTION OF BREAST       SOCHX:  reports that she has been smoking cigarettes. She has a 20.00 pack-year smoking history. She has never used smokeless tobacco. She reports previous alcohol use of about 0.5 oz of alcohol per week. She reports  "that she does not use drugs.     FH: Reviewed with patient, not pertinent to this visit.    Review of Systems   Constitutional: Negative.    Musculoskeletal:        Right foot injury, pain, swelling   Skin: Negative for rash.   Neurological: Negative.    All other systems reviewed and are negative.    Additional details per HPI.      Objective:     /84 (BP Location: Left arm, Patient Position: Sitting, BP Cuff Size: Adult)   Pulse 86   Temp 36.2 °C (97.2 °F) (Temporal)   Resp 16   Ht 1.651 m (5' 5\")   Wt 79.1 kg (174 lb 6.4 oz)   LMP 08/03/1999   SpO2 98%   BMI 29.02 kg/m²     Physical Exam  Vitals reviewed.   Constitutional:       General: She is not in acute distress.     Appearance: She is well-developed. She is not ill-appearing or toxic-appearing.   HENT:      Head: Normocephalic.      Right Ear: External ear normal.      Left Ear: External ear normal.   Eyes:      General: Vision grossly intact.      Extraocular Movements: Extraocular movements intact.      Conjunctiva/sclera: Conjunctivae normal.   Cardiovascular:      Rate and Rhythm: Normal rate.      Pulses: Normal pulses.   Pulmonary:      Effort: Pulmonary effort is normal. No respiratory distress.   Musculoskeletal:         General: No deformity.      Cervical back: Normal range of motion.      Right ankle: No swelling, deformity, ecchymosis or lacerations. No tenderness. Normal range of motion.      Right foot: Normal range of motion and normal capillary refill. Swelling and tenderness (Dorsal lateral aspect of midfoot; dorsal aspect of forefoot) present. No deformity or laceration.   Skin:     General: Skin is warm and dry.      Capillary Refill: Capillary refill takes less than 2 seconds.      Coloration: Skin is not pale.   Neurological:      Mental Status: She is alert and oriented to person, place, and time.      Sensory: No sensory deficit.      Motor: No weakness.      Coordination: Coordination normal.   Psychiatric:         " Behavior: Behavior normal. Behavior is cooperative.     X-ray of right foot:    Details    Reading Physician Reading Date Result Priority   Greg Esparza M.D.  [phone] 7/31/2021 Urgent Care      Narrative & Impression     7/31/2021 1:12 PM     HISTORY/REASON FOR EXAM:  Pain/Deformity Following Trauma    TECHNIQUE/EXAM DESCRIPTION AND NUMBER OF VIEWS:  3 views of the RIGHT foot.     COMPARISON:  9/23/2013     FINDINGS:  There is an oblique minimally displaced fifth metatarsal diaphysis fracture. There is overlying soft tissue swelling.  Small ossific density at the fifth proximal phalangeal base region is nonspecific and could represent age-indeterminate small fracture or accessory ossicle.  Mild degenerative changes of the first and second MTP joints.  Bone mineralization is age-appropriate..    IMPRESSION:     1.  Oblique minimally displaced fifth metatarsal shaft fracture.  2.  Questionable age indeterminate fracture of the fifth proximal phalangeal base.         Last Resulted: 07/31/21  2:07 PM        Radiology report and images reviewed by myself. Concur with findings.      Assessment/Plan:     1. Right foot pain  - DX-FOOT-COMPLETE 3+ RIGHT; Future  - ibuprofen (MOTRIN) 800 MG Tab; Take 1 tablet by mouth every 8 hours as needed for Moderate Pain, Fever or Inflammation.  Dispense: 30 tablet; Refill: 0  - REFERRAL TO PODIATRY  - REFERRAL TO PODIATRY    2. Closed displaced fracture of fifth metatarsal bone of right foot, initial encounter  - ibuprofen (MOTRIN) 800 MG Tab; Take 1 tablet by mouth every 8 hours as needed for Moderate Pain, Fever or Inflammation.  Dispense: 30 tablet; Refill: 0  - REFERRAL TO PODIATRY  - REFERRAL TO PODIATRY    Tall walking boot applied.  Crutches provided.  Rx as above sent electronically.  Follow up with podiatry; referral placed.    Differential diagnosis, natural history, supportive care, over-the-counter symptom management per 's instructions, close monitoring,  and indications for immediate follow-up discussed.     All questions answered. Patient agrees with the plan of care.    Discharge summary provided.

## 2021-07-31 NOTE — PATIENT INSTRUCTIONS
Foot Fracture  Your caregiver has diagnosed you as having a foot fracture (broken bone). Your foot has many bones. You have a fracture, or break, in one of these bones. In some cases, your doctor may put on a splint or removable fracture boot until the swelling in your foot has lessened. A cast may or may not be required.  HOME CARE INSTRUCTIONS   If you do not have a cast or splint:  · You may bear weight on your injured foot as tolerated or advised.   · Do not put any weight on your injured foot for as long as directed by your caregiver. Slowly increase the amount of time you walk on the foot as the pain and swelling allows or as advised.   · Use crutches until you can bear weight without pain. A gradual increase in weight bearing may help.   · Apply ice to the injury for 15-20 minutes each hour while awake for the first 2 days. Put the ice in a plastic bag and place a towel between the bag of ice and your skin.   · If an ace bandage (stretchy, elastic wrapping bandage) was applied, you may re-wrap it if ankle is more painful or your toes become cold and swollen.   If you have a cast or splint:  · Use your crutches for as long as directed by your caregiver.   · To lessen the swelling, keep the injured foot elevated on pillows while lying down or sitting. Elevate your foot above your heart.   · Apply ice to the injury for 15-20 minutes each hour while awake for the first 2 days. Put the ice in a plastic bag and place a thin towel between the bag of ice and your cast.   · Plaster or fiberglass cast:   · Do not try to scratch the skin under the cast using a sharp or pointed object down the cast.   · Check the skin around the cast every day. You may put lotion on any red or sore areas.   · Keep your cast clean and dry.   · Plaster splint:   · Wear the splint until you are seen for a follow-up examination.   · You may loosen the elastic around the splint if your toes become numb, tingle, or turn blue or cold. Do not  rest it on anything harder than a pillow in the first 24 hours.   · Do not put pressure on any part of your splint. Use your crutches as directed.   · Keep your splint dry. It can be protected during bathing with a plastic bag. Do not lower the splint into water.   · If you have a fracture boot you may remove it to shower. Bear weight only as instructed by your caregiver.   · Only take over-the-counter or prescription medicines for pain, discomfort, or fever as directed by your caregiver.   SEEK IMMEDIATE MEDICAL CARE IF:   · Your cast gets damaged or breaks.   · You have continued severe pain or more swelling than you did before the cast was put on.   · Your skin or nails of your casted foot turn blue, gray, feel cold or numb.   · There is a bad smell from your cast.   · There is severe pain with movement of your toes.   · There are new stains and/or drainage coming from under the cast.   MAKE SURE YOU:   · Understand these instructions.   · Will watch your condition.   · Will get help right away if you are not doing well or get worse.   Document Released: 12/15/2001 Document Revised: 03/11/2013 Document Reviewed: 01/21/2010  [employer]® Patient Information ©2013 [employer].      A referral request has been placed for podiatry. We have a referrals department that is tasked with locating a suitable office that currently accepts patients and your insurance and you should be receiving referral information from them such as the office name, address, and number. This process usually takes around 3 business days. If you are not contacted by our referrals department, please call [phone].

## 2022-06-11 ENCOUNTER — HOSPITAL ENCOUNTER (EMERGENCY)
Facility: MEDICAL CENTER | Age: 57
End: 2022-06-11
Attending: EMERGENCY MEDICINE
Payer: MEDICAID

## 2022-06-11 VITALS
HEART RATE: 98 BPM | BODY MASS INDEX: 27.66 KG/M2 | TEMPERATURE: 97.5 F | SYSTOLIC BLOOD PRESSURE: 175 MMHG | OXYGEN SATURATION: 93 % | HEIGHT: 65 IN | DIASTOLIC BLOOD PRESSURE: 105 MMHG | WEIGHT: 166.01 LBS | RESPIRATION RATE: 17 BRPM

## 2022-06-11 DIAGNOSIS — I10 PRIMARY HYPERTENSION: ICD-10-CM

## 2022-06-11 LAB
ANION GAP SERPL CALC-SCNC: 10 MMOL/L (ref 7–16)
BASOPHILS # BLD AUTO: 0.5 % (ref 0–1.8)
BASOPHILS # BLD: 0.03 K/UL (ref 0–0.12)
BUN SERPL-MCNC: 16 MG/DL (ref 8–22)
CALCIUM SERPL-MCNC: 9.5 MG/DL (ref 8.5–10.5)
CHLORIDE SERPL-SCNC: 106 MMOL/L (ref 96–112)
CO2 SERPL-SCNC: 24 MMOL/L (ref 20–33)
CREAT SERPL-MCNC: 0.68 MG/DL (ref 0.5–1.4)
EKG IMPRESSION: NORMAL
EOSINOPHIL # BLD AUTO: 0.2 K/UL (ref 0–0.51)
EOSINOPHIL NFR BLD: 3.1 % (ref 0–6.9)
ERYTHROCYTE [DISTWIDTH] IN BLOOD BY AUTOMATED COUNT: 45.8 FL (ref 35.9–50)
GFR SERPLBLD CREATININE-BSD FMLA CKD-EPI: 101 ML/MIN/1.73 M 2
GLUCOSE SERPL-MCNC: 93 MG/DL (ref 65–99)
HCT VFR BLD AUTO: 42.4 % (ref 37–47)
HGB BLD-MCNC: 13.4 G/DL (ref 12–16)
IMM GRANULOCYTES # BLD AUTO: 0.01 K/UL (ref 0–0.11)
IMM GRANULOCYTES NFR BLD AUTO: 0.2 % (ref 0–0.9)
LYMPHOCYTES # BLD AUTO: 1.81 K/UL (ref 1–4.8)
LYMPHOCYTES NFR BLD: 28.1 % (ref 22–41)
MCH RBC QN AUTO: 27 PG (ref 27–33)
MCHC RBC AUTO-ENTMCNC: 31.6 G/DL (ref 33.6–35)
MCV RBC AUTO: 85.3 FL (ref 81.4–97.8)
MONOCYTES # BLD AUTO: 0.44 K/UL (ref 0–0.85)
MONOCYTES NFR BLD AUTO: 6.8 % (ref 0–13.4)
NEUTROPHILS # BLD AUTO: 3.94 K/UL (ref 2–7.15)
NEUTROPHILS NFR BLD: 61.3 % (ref 44–72)
NRBC # BLD AUTO: 0 K/UL
NRBC BLD-RTO: 0 /100 WBC
PLATELET # BLD AUTO: 257 K/UL (ref 164–446)
PMV BLD AUTO: 9.6 FL (ref 9–12.9)
POTASSIUM SERPL-SCNC: 4.7 MMOL/L (ref 3.6–5.5)
RBC # BLD AUTO: 4.97 M/UL (ref 4.2–5.4)
SODIUM SERPL-SCNC: 140 MMOL/L (ref 135–145)
WBC # BLD AUTO: 6.4 K/UL (ref 4.8–10.8)

## 2022-06-11 PROCEDURE — 80048 BASIC METABOLIC PNL TOTAL CA: CPT

## 2022-06-11 PROCEDURE — 700102 HCHG RX REV CODE 250 W/ 637 OVERRIDE(OP): Performed by: EMERGENCY MEDICINE

## 2022-06-11 PROCEDURE — 36415 COLL VENOUS BLD VENIPUNCTURE: CPT

## 2022-06-11 PROCEDURE — 85025 COMPLETE CBC W/AUTO DIFF WBC: CPT

## 2022-06-11 PROCEDURE — A9270 NON-COVERED ITEM OR SERVICE: HCPCS | Performed by: EMERGENCY MEDICINE

## 2022-06-11 PROCEDURE — 93005 ELECTROCARDIOGRAM TRACING: CPT | Performed by: EMERGENCY MEDICINE

## 2022-06-11 PROCEDURE — 99283 EMERGENCY DEPT VISIT LOW MDM: CPT

## 2022-06-11 PROCEDURE — 93005 ELECTROCARDIOGRAM TRACING: CPT

## 2022-06-11 RX ORDER — LISINOPRIL 20 MG/1
20 TABLET ORAL ONCE
Status: COMPLETED | OUTPATIENT
Start: 2022-06-11 | End: 2022-06-11

## 2022-06-11 RX ORDER — LISINOPRIL 20 MG/1
20 TABLET ORAL DAILY
Qty: 30 TABLET | Refills: 1 | Status: SHIPPED | OUTPATIENT
Start: 2022-06-11 | End: 2024-02-07 | Stop reason: SDUPTHER

## 2022-06-11 RX ADMIN — LISINOPRIL 20 MG: 20 TABLET ORAL at 08:49

## 2022-06-11 ASSESSMENT — FIBROSIS 4 INDEX: FIB4 SCORE: 1.2

## 2022-06-11 ASSESSMENT — PAIN DESCRIPTION - PAIN TYPE: TYPE: ACUTE PAIN

## 2022-06-11 NOTE — ED TRIAGE NOTES
"Madhavi Coffman  57 y.o. female    Chief Complaint   Patient presents with   • Hypertension     Pt arrives with complaints of hypertension. Pt was notified at dentist of HTN, has been monitoring at home and has consistently been 170-180 systolic. Was encouraged to come to ED for eval by friend.    Denies CP/SOB. Endorses headache and \"fuzzy\" vision.   Vitals:    06/11/22 0635   BP: (!) 170/108   Pulse:    Resp:    Temp:    SpO2:        Triage process explained to patient, apologized for wait time, and returned to lobby.  Pt informed to notify staff of any change in condition.     "

## 2022-06-11 NOTE — ED NOTES
"Patient from [facility] to [place] [facility] ambulatory with steady gait accompanied by ED Tech. A & O x 4, GCS 15. Complaining of mild headache and slightly \"fuzzy\" vision. Chart up for ERP.  "

## 2022-06-11 NOTE — ED PROVIDER NOTES
ED Provider Note    Scribed for Khurram Szymanski M.D. by Winnie Soto. 6/11/2022  8:28 AM    Primary care provider: Pcp Pt States None  Means of arrival: Walk-in  History obtained from: Patient  History limited by: None    CHIEF COMPLAINT  Chief Complaint   Patient presents with    Hypertension       HPI  Madhavi Coffman is a 57 y.o. female who presents to the Emergency Department for Hypertension. She reports associated headache and blurry vision. She denies taking any stimulants, but does drink one coffee every morning to wake up early for work.    REVIEW OF SYSTEMS  Pertinent positives include headache and blurry vision.   Pertinent negatives include no fever.    All other systems reviewed and negative. See HPI for further details.       PAST MEDICAL HISTORY   has a past medical history of Allergy, Anxiety, Arthritis, Depression, Diverticulosis, Hemorrhoids, Hiatus hernia syndrome, Indigestion, LBP (low back pain) (9/7/12), Migraine without aura, with intractable migraine, so stated, without mention of status migrainosus, Obesity, Peripheral neuropathy, and Thoracic or lumbosacral neuritis or radiculitis, unspecified.    SURGICAL HISTORY   has a past surgical history that includes sigmoid colectomy (2004); gastric bypass laparoscopic; removal of ovary/tube(s) (2005); tonsillectomy (1986); cholecystectomy; other (1999); fusion, spine, lumbar, plif (11/4/2009); lumbar decompression (11/4/2009); gastric bypass laparoscopic (4/5/2010); hiatal hernia repair (4/5/2010); s i joint fusion (11/11/2010); breast reduction; fusion, spine, lumbar, plif (7/26/2011); lumbar decompression (7/26/2011); hardware removal ortho (7/26/2011); percut implnt neuroelect,epidural (6/22/2012); percut implnt neuroelect,epidural (6/22/2012); inj,sacroiliac,anes/steroid (7/27/2012); inj,sacroiliac,anes/steroid (7/27/2012); thoracic laminectomy (9/18/2012); spinal cord stimulator (9/18/2012); inj,sacroiliac,anes/steroid (11/9/2012);  inj,sacroiliac,anes/steroid (2012); neuro dest facet l/s w/ig sngl (3/29/2013); neuro dest facet l/s w/ig addl (3/29/2013); neuro dest facet l/s w/ig addl (3/29/2013); neuro dest facet l/s w/ig addl (3/29/2013); neuro dest facet l/s w/ig sngl (2013); neuro dest facet l/s w/ig addl (2013); neuro dest facet l/s w/ig addl (2013); inj,sacroiliac,anes/steroid (2013); inj,sacroiliac,anes/steroid (2013); and inj,sacroiliac,anes/steroid (10/18/2013).    SOCIAL HISTORY  Social History     Tobacco Use    Smoking status: Current Every Day Smoker     Packs/day: 1.00     Years: 20.00     Pack years: 20.00     Types: Cigarettes     Last attempt to quit: 1997     Years since quittin.4    Smokeless tobacco: Never Used    Tobacco comment:    Vaping Use    Vaping Use: Never used   Substance Use Topics    Alcohol use: Not Currently     Alcohol/week: 0.5 oz     Types: 1 Standard drinks or equivalent per week     Comment: 2 per week    Drug use: No      Social History     Substance and Sexual Activity   Drug Use No       FAMILY HISTORY  Family History   Problem Relation Age of Onset    Diabetes Father     Hypertension Father     Other Father         Migraine    Heart Disease Maternal Grandfather     Alcohol/Drug Paternal Grandfather     Other Brother         Migraine    Cancer Maternal Aunt         breast       CURRENT MEDICATIONS  Home Medications       Reviewed by Desi Jean-Baptiste R.N. (Registered Nurse) on 22 at 0831  Med List Status: Partial     Medication Last Dose Status   amoxicillin-clavulanate (AUGMENTIN) 875-125 MG Tab  Active   baclofen (LIORESAL) 10 MG Tab  Active   cefdinir (OMNICEF) 300 MG Cap  Active   ibuprofen (MOTRIN) 800 MG Tab  Active   metoclopramide (REGLAN) 10 MG Tab  Active   ondansetron (ZOFRAN) 4 MG Tab tablet  Active   topiramate (TOPAMAX) 100 MG Tab  Active                    ALLERGIES  Allergies   Allergen Reactions    Cleocin [Clindamycin Hcl]      Stomach  "upset    Miconazole     Neosporin [Neomycin-Bacitracin-Polymyxin] Vomiting and Swelling    Sudafed [Pseudoephedrine Hcl] Rash    Tape Contact Dermatitis       PHYSICAL EXAM  VITAL SIGNS: BP (!) 204/111   Pulse 93   Temp 36.3 °C (97.3 °F) (Temporal)   Resp 18   Ht 1.651 m (5' 5\")   Wt 75.3 kg (166 lb 0.1 oz)   LMP 08/03/1999   SpO2 96%   BMI 27.62 kg/m²     Nursing note and vitals reviewed.  Constitutional: Well-developed and well-nourished. No distress.   HENT: Head is normocephalic and atraumatic. Oropharynx is clear and moist without exudate or erythema.   Eyes: Pupils are equal, round, and reactive to light. Conjunctiva are normal.   Cardiovascular: Normal rate and regular rhythm. No murmur heard. Normal radial pulses.  Pulmonary/Chest: Breath sounds normal. No wheezes or rales.   Abdominal: Soft and non-tender. No distention    Musculoskeletal: Extremities exhibit normal range of motion without edema or tenderness.   Neurological: Awake, alert and oriented to person, place, and time. No focal deficits noted.  Skin: Skin is warm and dry. No rash.   Psychiatric: Normal mood and affect. Appropriate for clinical situation.    DIAGNOSTIC STUDIES / PROCEDURES    EKG Interpretation  Interpreted by me as below    LABS  Results for orders placed or performed during the hospital encounter of 06/11/22   Basic Metabolic Panel   Result Value Ref Range    Sodium 140 135 - 145 mmol/L    Potassium 4.7 3.6 - 5.5 mmol/L    Chloride 106 96 - 112 mmol/L    Co2 24 20 - 33 mmol/L    Glucose 93 65 - 99 mg/dL    Bun 16 8 - 22 mg/dL    Creatinine 0.68 0.50 - 1.40 mg/dL    Calcium 9.5 8.5 - 10.5 mg/dL    Anion Gap 10.0 7.0 - 16.0   CBC WITH DIFFERENTIAL   Result Value Ref Range    WBC 6.4 4.8 - 10.8 K/uL    RBC 4.97 4.20 - 5.40 M/uL    Hemoglobin 13.4 12.0 - 16.0 g/dL    Hematocrit 42.4 37.0 - 47.0 %    MCV 85.3 81.4 - 97.8 fL    MCH 27.0 27.0 - 33.0 pg    MCHC 31.6 (L) 33.6 - 35.0 g/dL    RDW 45.8 35.9 - 50.0 fL    Platelet " Count 257 164 - 446 K/uL    MPV 9.6 9.0 - 12.9 fL    Neutrophils-Polys 61.30 44.00 - 72.00 %    Lymphocytes 28.10 22.00 - 41.00 %    Monocytes 6.80 0.00 - 13.40 %    Eosinophils 3.10 0.00 - 6.90 %    Basophils 0.50 0.00 - 1.80 %    Immature Granulocytes 0.20 0.00 - 0.90 %    Nucleated RBC 0.00 /100 WBC    Neutrophils (Absolute) 3.94 2.00 - 7.15 K/uL    Lymphs (Absolute) 1.81 1.00 - 4.80 K/uL    Monos (Absolute) 0.44 0.00 - 0.85 K/uL    Eos (Absolute) 0.20 0.00 - 0.51 K/uL    Baso (Absolute) 0.03 0.00 - 0.12 K/uL    Immature Granulocytes (abs) 0.01 0.00 - 0.11 K/uL    NRBC (Absolute) 0.00 K/uL   ESTIMATED GFR   Result Value Ref Range    GFR (CKD-EPI) 101 >60 mL/min/1.73 m 2   EKG (NOW)   Result Value Ref Range    Report       [facility] Emergency Dept.    Test Date:  2022  Pt Name:    SINGH DUARTE               Department: ER  MRN:        [id number]                      Room:  Gender:     Female                       Technician: [id number]  :        1965                   Requested By:ER TRIAGE PROTOCOL  Order #:    [id number]                    Reading MD: JOCELYN PARK MD    Measurements  Intervals                                Axis  Rate:       84                           P:          14  MO:         144                          QRS:        -2  QRSD:       100                          T:          5  QT:         392  QTc:        464    Interpretive Statements  SINUS RHYTHM  BORDERLINE T ABNORMALITIES, ANTERIOR LEADS  Compared to ECG 2019 10:05:14  T-wave abnormality now present  Right ventricular hypertrophy no longer present  Myocardial infarct finding no longer present  Electronically Signed On 2022 8:28:00 PDT by JOCELYN PARK MD         All labs reviewed by me.    RADIOLOGY  No orders to display     The radiologist's interpretation of all radiological studies have been reviewed by me.    COURSE & MEDICAL DECISION MAKING  Nursing notes, VS, PMSFHx reviewed in chart.  "    8:28 AM - Patient seen and examined at bedside. Ordered BMP, CBC w/ Diff, and EKG to evaluate her symptoms. The differential diagnoses include but are not limited to: Hypertension    Patient presents today with a history of hypertension.  Laboratory studies are reassuring.  No renal insufficiency.  We will start the patient on lisinopril.  She already has an appointment with her primary care provider.  She is encouraged to try to move this appointment to a sooner date.  She will maintain a blood pressure log.  Overall no \"red flags\".    The patient will return for new or worsening symptoms and is stable at the time of discharge.    The patient is referred to a primary physician for blood pressure management, diabetic screening, and for all other preventative health concerns.    DISPOSITION:  Patient will be discharged home in stable condition.    FOLLOW UP:  [facility], Emergency Dept  [address] [address] [phone]    If symptoms worsen    your doctor, as planned. Call to see if appointment can be moved up.            OUTPATIENT MEDICATIONS:  Discharge Medication List as of 6/11/2022  8:51 AM        START taking these medications    Details   lisinopril (PRINIVIL) 20 MG Tab Take 1 Tablet by mouth every day., Disp-30 Tablet, R-1, Normal               FINAL IMPRESSION  1. Primary hypertension          Winnie GLASS ([employer]), am scribing for, and in the presence of, Khurram Szymanski M.D..    Electronically signed by: Winnie Soto (Danette), 6/11/2022    Khurram GLASS M.D. personally performed the services described in this documentation, as scribed by Winnie Soto in my presence, and it is both accurate and complete.    The note accurately reflects work and decisions made by me.  Khurram Szymanski M.D.  6/11/2022  11:04 AM  "

## 2023-04-08 ENCOUNTER — APPOINTMENT (OUTPATIENT)
Dept: URGENT CARE | Facility: PHYSICIAN GROUP | Age: 58
End: 2023-04-08
Payer: MEDICAID

## 2023-04-08 ENCOUNTER — OFFICE VISIT (OUTPATIENT)
Dept: URGENT CARE | Facility: CLINIC | Age: 58
End: 2023-04-08
Payer: MEDICAID

## 2023-04-08 VITALS
HEIGHT: 65 IN | OXYGEN SATURATION: 97 % | TEMPERATURE: 98.1 F | RESPIRATION RATE: 18 BRPM | WEIGHT: 144 LBS | BODY MASS INDEX: 23.99 KG/M2 | SYSTOLIC BLOOD PRESSURE: 118 MMHG | HEART RATE: 103 BPM | DIASTOLIC BLOOD PRESSURE: 82 MMHG

## 2023-04-08 DIAGNOSIS — H92.03 ACUTE EAR PAIN, BILATERAL: ICD-10-CM

## 2023-04-08 PROCEDURE — 99213 OFFICE O/P EST LOW 20 MIN: CPT | Performed by: FAMILY MEDICINE

## 2023-04-08 RX ORDER — TRAZODONE HYDROCHLORIDE 50 MG/1
50 TABLET ORAL NIGHTLY
COMMUNITY
End: 2024-03-17

## 2023-04-08 RX ORDER — PREDNISONE 20 MG/1
40 TABLET ORAL DAILY
Qty: 10 TABLET | Refills: 0 | Status: SHIPPED | OUTPATIENT
Start: 2023-04-08 | End: 2023-04-13

## 2023-04-08 RX ORDER — ATORVASTATIN CALCIUM 10 MG/1
10 TABLET, FILM COATED ORAL NIGHTLY
COMMUNITY
End: 2024-02-07 | Stop reason: SDUPTHER

## 2023-04-08 RX ORDER — HYDROCHLOROTHIAZIDE 25 MG/1
25 TABLET ORAL DAILY
COMMUNITY
End: 2024-02-07 | Stop reason: SDUPTHER

## 2023-04-08 NOTE — PROGRESS NOTES
Subjective:     Madhavi Coffman is a 58 y.o. female who presents for Otalgia (X 4 days, bilateral ear pain, dizziness, nausea)    HPI  Pt presents for evaluation of an acute problem patient with bilateral ear pain for the past 4 days  Left ear is worse   Has mild muffling of hearing   No ear discharge   No cough, sore throat, nasal congestion  No vomiting or diarrhea  No fevers    Review of Systems   Constitutional:  Negative for fever.   HENT:  Positive for ear pain.    Skin:  Negative for rash.     PMH:  has a past medical history of Allergy, Anxiety, Arthritis, Depression, Diverticulosis, Hemorrhoids, Hiatus hernia syndrome, Indigestion, LBP (low back pain) (9/7/12), Migraine without aura, with intractable migraine, so stated, without mention of status migrainosus, Obesity, Peripheral neuropathy, and Thoracic or lumbosacral neuritis or radiculitis, unspecified.    She has no past medical history of Breast cancer (HCC).  MEDS:   Current Outpatient Medications:     traZODone (DESYREL) 50 MG Tab, Take 50 mg by mouth every evening., Disp: , Rfl:     atorvastatin (LIPITOR) 10 MG Tab, Take 10 mg by mouth every evening., Disp: , Rfl:     hydroCHLOROthiazide (HYDRODIURIL) 25 MG Tab, Take 25 mg by mouth every day., Disp: , Rfl:     predniSONE (DELTASONE) 20 MG Tab, Take 2 Tablets by mouth every day for 5 days., Disp: 10 Tablet, Rfl: 0    lisinopril (PRINIVIL) 20 MG Tab, Take 1 Tablet by mouth every day., Disp: 30 Tablet, Rfl: 1    ibuprofen (MOTRIN) 800 MG Tab, Take 1 tablet by mouth every 8 hours as needed for Moderate Pain, Fever or Inflammation., Disp: 30 tablet, Rfl: 0  ALLERGIES:   Allergies   Allergen Reactions    Cleocin [Clindamycin Hcl]      Stomach upset    Miconazole     Neosporin [Neomycin-Bacitracin-Polymyxin] Vomiting and Swelling    Sudafed [Pseudoephedrine Hcl] Rash    Tape Contact Dermatitis     SURGHX:   Past Surgical History:   Procedure Laterality Date    INJ,SACROILIAC,ANES/STEROID  10/18/2013     Performed by Jose Eduardo Archuleta D.O. at [facility]    INJ,SACROILIAC,ANES/STEROID  8/16/2013    Performed by Jose Eduardo Archuleta D.O. at [facility]    INJ,SACROILIAC,ANES/STEROID  8/16/2013    Performed by Jose Eduardo Archuleta D.O. at [facility]    NEURO DEST FACET L/S W/IG SNGL  4/19/2013    Performed by Jose Eduardo Archuleta D.O. at [facility]    NEURO DEST FACET L/S W/IG ADDL  4/19/2013    Performed by Jose Eduardo Archuleta D.O. at [facility]    NEURO DEST FACET L/S W/IG ADDL  4/19/2013    Performed by Jose Eduardo Arch[facility]ta D.O. at [facility]    NEURO DEST FACET L/S W/IG SNGL  3/29/2013    Performed by Jose Eduardo Archuleta D.O. at [facility]    NEURO DEST FACET L/S W/IG ADDL  3/29/2013    Performed by Jose Eduardo Archuleta D.O. at [facility]    NEURO DEST FACET L/S W/IG ADDL  3/29/2013    Performed by Jose Eduardo Archuleta D.O. at [facility]    NEURO DEST FACET L/S W/IG ADDL  3/29/2013    Performed by Jose Eduardo Archuleta D.O. at [facility]    INJ,SACROILIAC,ANES/STEROID  12/28/2012    Performed by Jose Eduardo Archuleta D.O. at [facility]    INJ,SACROILIAC,ANES/STEROID  11/9/2012    Performed by Jose Eduardo Archuleta D.O. at [facility]    THORACIC LAMINECTOMY  9/18/2012    Performed by King Leal M.D. at [facility]S    SPINAL CORD STIMULATOR  9/18/2012    Performed by King Leal M.D. at [facility]    INJ,SACROILIAC,ANES/STEROID  7/27/2012    Performed by JOSE EDUARDO ARCHULETA at [facility]    INJ,SACROILIAC,ANES/STEROID  7/27/2012    Performed by JOSE EDUARDO ARCHULETA at [facility]    MA PERCUT IMPLNT NEUROELECT,EPIDURAL  6/22/2012    Performed by JOSE EDUARDO ARCHULETA at [facility]    MA PERCUT IMPLNT NEUROELECT,EPIDURAL  6/22/2012    Performed by JOSE EDUARDO ARCHULETA at [facility]  "   FUSION, SPINE, LUMBAR, PLIF  7/26/2011    Performed by DANIAL KABA at [facility]    LUMBAR DECOMPRESSION  7/26/2011    Performed by DANIAL KABA at [facility]    HARDWARE REMOVAL ORTHO  7/26/2011    Performed by DANIAL KABA at [facility]    S I JOINT FUSION  11/11/2010    Performed by DANIAL KABA at [facility]    GASTRIC BYPASS LAPAROSCOPIC  4/5/2010    Performed by GANSER, JOHN H at [facility]S    HIATAL HERNIA REPAIR  4/5/2010    Performed by GANSER, JOHN H at [facility]S    FUSION, SPINE, LUMBAR, PLIF  11/4/2009    Performed by DANIAL KABA at [facility]    LUMBAR DECOMPRESSION  11/4/2009    Performed by DANIAL KABA at [facility]    [place] REMOVAL OF OVARY/TUBE(S)  2005    left    SIGMOID COLECTOMY  2004    OTHER  1999    lasik surgery    TONSILLECTOMY  1986    CHOLECYSTECTOMY      GASTRIC BYPASS LAPAROSCOPIC      x2    IL BREAST REDUCTION       SOCHX:  reports that she quit smoking about 26 years ago. Her smoking use included cigarettes. She has a 20.00 pack-year smoking history. She has never used smokeless tobacco. She reports that she does not currently use alcohol after a past usage of about 0.5 oz per week. She reports that she does not use drugs.     Objective:   /82   Pulse (!) 103   Temp 36.7 °C (98.1 °F) (Temporal)   Resp 18   Ht 1.651 m (5' 5\")   Wt 65.3 kg (144 lb)   LMP 08/03/1999   SpO2 97%   BMI 23.96 kg/m²     Physical Exam  Constitutional:       General: She is not in acute distress.     Appearance: She is well-developed. She is not diaphoretic.   HENT:      Head: Normocephalic and atraumatic.      Right Ear: Tympanic membrane, ear canal and external ear normal.      Left Ear: Tympanic membrane, ear canal and external ear normal.      Nose: Nose normal.      Mouth/Throat:      Mouth: Mucous membranes are moist.      Pharynx: Oropharynx is clear. No " oropharyngeal exudate or posterior oropharyngeal erythema.   Pulmonary:      Effort: Pulmonary effort is normal.   Musculoskeletal:      Cervical back: Normal range of motion and neck supple. No tenderness.   Lymphadenopathy:      Cervical: No cervical adenopathy.   Neurological:      Mental Status: She is alert.       Assessment/Plan:   Assessment    1. Acute ear pain, bilateral  - predniSONE (DELTASONE) 20 MG Tab; Take 2 Tablets by mouth every day for 5 days.  Dispense: 10 Tablet; Refill: 0    Other orders  - traZODone (DESYREL) 50 MG Tab; Take 50 mg by mouth every evening.  - atorvastatin (LIPITOR) 10 MG Tab; Take 10 mg by mouth every evening.  - hydroCHLOROthiazide (HYDRODIURIL) 25 MG Tab; Take 25 mg by mouth every day.    Patient with bilateral ear pain.  Likely from eustachian tube dysfunction.  No signs of infection at this time.  Reviewed supportive care measures expected course of recovery.  Follow-up if not resolving with steroid treatment.

## 2023-04-08 NOTE — LETTER
April 8, 2023    To Whom It May Concern:         This is confirmation that Madhavi Preethi Coffman attended her scheduled appointment with Shorty Fox M.D. on 4/08/23.  Please excuse her from days missed from work.  She is anticipated to be well enough to return by 4/10/23.           If you have any questions please do not hesitate to call me at the phone number listed below.    Sincerely,          Shorty Fox M.D.  [phone]

## 2023-04-10 ASSESSMENT — ENCOUNTER SYMPTOMS: FEVER: 0

## 2024-02-05 ENCOUNTER — APPOINTMENT (OUTPATIENT)
Dept: MEDICAL GROUP | Facility: MEDICAL CENTER | Age: 59
End: 2024-02-05

## 2024-02-07 ENCOUNTER — OFFICE VISIT (OUTPATIENT)
Dept: MEDICAL GROUP | Facility: MEDICAL CENTER | Age: 59
End: 2024-02-07
Payer: COMMERCIAL

## 2024-02-07 VITALS
OXYGEN SATURATION: 98 % | SYSTOLIC BLOOD PRESSURE: 140 MMHG | TEMPERATURE: 98 F | DIASTOLIC BLOOD PRESSURE: 100 MMHG | BODY MASS INDEX: 31.45 KG/M2 | HEART RATE: 92 BPM | WEIGHT: 189 LBS

## 2024-02-07 DIAGNOSIS — M54.50 LOW BACK PAIN, UNSPECIFIED BACK PAIN LATERALITY, UNSPECIFIED CHRONICITY, UNSPECIFIED WHETHER SCIATICA PRESENT: ICD-10-CM

## 2024-02-07 DIAGNOSIS — E78.5 HYPERLIPIDEMIA LDL GOAL <100: ICD-10-CM

## 2024-02-07 DIAGNOSIS — Z13.29 SCREENING FOR THYROID DISORDER: ICD-10-CM

## 2024-02-07 DIAGNOSIS — F33.0 MILD EPISODE OF RECURRENT MAJOR DEPRESSIVE DISORDER (HCC): ICD-10-CM

## 2024-02-07 DIAGNOSIS — K44.9 HIATUS HERNIA SYNDROME: ICD-10-CM

## 2024-02-07 DIAGNOSIS — Z13.0 SCREENING, ANEMIA, DEFICIENCY, IRON: ICD-10-CM

## 2024-02-07 DIAGNOSIS — E55.9 VITAMIN D DEFICIENCY: ICD-10-CM

## 2024-02-07 DIAGNOSIS — Z12.31 ENCOUNTER FOR SCREENING MAMMOGRAM FOR MALIGNANT NEOPLASM OF BREAST: ICD-10-CM

## 2024-02-07 DIAGNOSIS — N95.9 POST MENOPAUSAL PROBLEMS: ICD-10-CM

## 2024-02-07 DIAGNOSIS — F41.9 ANXIETY: ICD-10-CM

## 2024-02-07 DIAGNOSIS — I10 PRIMARY HYPERTENSION: ICD-10-CM

## 2024-02-07 DIAGNOSIS — M19.90 ARTHRITIS: ICD-10-CM

## 2024-02-07 DIAGNOSIS — G43.019 INTRACTABLE MIGRAINE WITHOUT AURA AND WITHOUT STATUS MIGRAINOSUS: ICD-10-CM

## 2024-02-07 DIAGNOSIS — F51.01 PRIMARY INSOMNIA: ICD-10-CM

## 2024-02-07 DIAGNOSIS — Z12.11 SCREENING FOR MALIGNANT NEOPLASM OF COLON: ICD-10-CM

## 2024-02-07 DIAGNOSIS — G61.82 MULTIFOCAL MOTOR NEUROPATHY (HCC): ICD-10-CM

## 2024-02-07 DIAGNOSIS — Z13.89 SCREENING FOR MULTIPLE CONDITIONS: ICD-10-CM

## 2024-02-07 DIAGNOSIS — M51.36 DDD (DEGENERATIVE DISC DISEASE), LUMBAR: ICD-10-CM

## 2024-02-07 DIAGNOSIS — K57.90 DIVERTICULOSIS: ICD-10-CM

## 2024-02-07 DIAGNOSIS — K21.00 GASTROESOPHAGEAL REFLUX DISEASE WITH ESOPHAGITIS WITHOUT HEMORRHAGE: ICD-10-CM

## 2024-02-07 DIAGNOSIS — R13.19 ESOPHAGEAL DYSPHAGIA: ICD-10-CM

## 2024-02-07 DIAGNOSIS — Z13.220 SCREENING FOR HYPERLIPIDEMIA: ICD-10-CM

## 2024-02-07 PROBLEM — M51.369 DDD (DEGENERATIVE DISC DISEASE), LUMBAR: Status: ACTIVE | Noted: 2024-02-07

## 2024-02-07 PROCEDURE — 3080F DIAST BP >= 90 MM HG: CPT | Performed by: NURSE PRACTITIONER

## 2024-02-07 PROCEDURE — 99214 OFFICE O/P EST MOD 30 MIN: CPT | Performed by: NURSE PRACTITIONER

## 2024-02-07 PROCEDURE — 3077F SYST BP >= 140 MM HG: CPT | Performed by: NURSE PRACTITIONER

## 2024-02-07 RX ORDER — DOXEPIN HYDROCHLORIDE 10 MG/1
10 CAPSULE ORAL NIGHTLY
Qty: 30 CAPSULE | Refills: 1 | Status: SHIPPED | OUTPATIENT
Start: 2024-02-07

## 2024-02-07 RX ORDER — HYDROCHLOROTHIAZIDE 25 MG/1
25 TABLET ORAL DAILY
Qty: 30 TABLET | Refills: 1 | Status: SHIPPED | OUTPATIENT
Start: 2024-02-07

## 2024-02-07 RX ORDER — ATORVASTATIN CALCIUM 10 MG/1
10 TABLET, FILM COATED ORAL NIGHTLY
Qty: 30 TABLET | Refills: 1 | Status: SHIPPED | OUTPATIENT
Start: 2024-02-07

## 2024-02-07 RX ORDER — OMEPRAZOLE 20 MG/1
20 CAPSULE, DELAYED RELEASE ORAL DAILY
Qty: 30 CAPSULE | Refills: 1 | Status: SHIPPED | OUTPATIENT
Start: 2024-02-07

## 2024-02-07 RX ORDER — SUCRALFATE 1 G/1
1 TABLET ORAL
Qty: 120 TABLET | Refills: 0 | Status: SHIPPED
Start: 2024-02-07 | End: 2024-03-17

## 2024-02-07 RX ORDER — LISINOPRIL 20 MG/1
20 TABLET ORAL DAILY
Qty: 30 TABLET | Refills: 1 | Status: SHIPPED | OUTPATIENT
Start: 2024-02-07

## 2024-02-08 NOTE — ASSESSMENT & PLAN NOTE
The ASCVD Risk score (Makenna SMITH, et al., 2019) failed to calculate for the following reasons:    Cannot find a previous HDL lab    Cannot find a previous total cholesterol lab  She has not had significant sx recently. No tx needed

## 2024-02-08 NOTE — ASSESSMENT & PLAN NOTE
She has not had migraines in a long time.  She has been on imitrex , maxalt and topamax with severe freq migraines in the past.  Now only occurring rarely and resolve with tyl

## 2024-02-08 NOTE — ASSESSMENT & PLAN NOTE
She is not on any med for anxiety at this time but does have significant anxiety.  She is not currently being treated for dx

## 2024-02-08 NOTE — ASSESSMENT & PLAN NOTE
She has freq GERD sx.  She also has dysphagia where she feels that foods, even water, is stuck.  She will have to throw up to relieve sx.

## 2024-02-08 NOTE — PROGRESS NOTES
Subjective     Madhavi Coffman is a 58 y.o. female who presents with Establish Care            HPI  Seen to re-establish care.  She has been out of insurance.  She now has insurance.  She has been out of her meds for 3 months.  Today her BP is elevated. She needs refills on meds.  She was initially started on meds 1 yr ago.  Her bp was elevated at dentist.  Then it stayed up so she went to ER.  That was when she was started on lisinopril and HCT.  Her BP has not been controlled well since she started med.   She was on trazodone for sleep but it wasn't working.  She is only sleeping a few hours at nite.  She will only sleep for about 3 hrs a nite. Then she will wake up and not be able to get back to sleep.  She was on lipitor for her chol.  She needs a refill on that.  She has not been exercising d/t working 12 hrs shifts.  She has a stressful job.  She is .    She has been on a healthy diet since she got her new teeth.  She also quit smoking.  Didn't wean off just stopped.   She is due pap smear and dexa scan.        Anxiety  She is not on any med for anxiety at this time but does have significant anxiety.  She is not currently being treated for dx    Arthritis  The ASCVD Risk score ([place] DK, et al., 2019) failed to calculate for the following reasons:    Cannot find a previous HDL lab    Cannot find a previous total cholesterol lab  She has not had significant sx recently. No tx needed    Depression  She has work stress and not sleeping.  Not currently on med tx but was on celexa in distant past.    Diverticulosis  She has not had any issues.  She is on healthy diet.      GERD with esophagitis  She has freq GERD sx.  She also has dysphagia where she feels that foods, even water, is stuck.  She will have to throw up to relieve sx.     Hiatus hernia syndrome  No current sx or tx needed    Intractable migraine without aura  She has not had migraines in a long time.  She has been on imitrex ,  maxalt and topamax with severe freq migraines in the past.  Now only occurring rarely and resolve with tyl    Low back pain  No recent pain or tx needed    Peripheral neuropathy  She still has sx that is controlled with voltaren gel, meditation and yoga.    DDD (degenerative disc disease), lumbar  Has had surgery in past.  No current significant sx or tx needed    Obesity  She has gained 30 lbs over the last several years.  She hasn't been exercising.  Does eat healthy    Vitamin D deficiency  She is not on otc supplement.  Needs updated lab.     Primary hypertension  Bp poorly controlled today.  Needs refills for lisinopril  and HCTZ    Hyperlipidemia LDL goal <100  She was on lipitor but ran out of RF. Needs med refilll    Patient Active Problem List    Diagnosis Date Noted    GERD with esophagitis 02/07/2024    Vitamin D deficiency 02/07/2024    DDD (degenerative disc disease), lumbar 02/07/2024    Primary hypertension 02/07/2024    Hyperlipidemia LDL goal <100 02/07/2024    Intractable migraine without aura     Arthritis     Hiatus hernia syndrome     Peripheral neuropathy     Diverticulosis     Anxiety 09/01/2009    Depression 09/01/2009    Obesity 08/03/2009    Low back pain 05/22/2009     Current Outpatient Medications   Medication Sig Dispense Refill    hydroCHLOROthiazide 25 MG Tab Take 1 Tablet by mouth every day. 30 Tablet 1    lisinopril (PRINIVIL) 20 MG Tab Take 1 Tablet by mouth every day. 30 Tablet 1    doxepin (SINEQUAN) 10 MG Cap Take 1 Capsule by mouth every evening. 30 Capsule 1    atorvastatin (LIPITOR) 10 MG Tab Take 1 Tablet by mouth every evening. 30 Tablet 1    omeprazole (PRILOSEC) 20 MG delayed-release capsule Take 1 Capsule by mouth every day. 30 Capsule 1    sucralfate (CARAFATE) 1 GM Tab Take 1 Tablet by mouth 4 Times a Day,Before Meals and at Bedtime. 120 Tablet 0    traZODone (DESYREL) 50 MG Tab Take 50 mg by mouth every evening.       No current facility-administered medications for  this visit.     Past Medical History:   Diagnosis Date    Anxiety     Arthritis     back, osteoarthritis    DDD (degenerative disc disease), lumbar 2024    Depression     Diverticulosis     GERD with esophagitis     Hiatus hernia syndrome     had surgical repair    Hyperlipidemia LDL goal <100 2024    LBP (low back pain) 2012    8/10    Migraine without aura, with intractable migraine, so stated, without mention of status migrainosus     Obesity     Peripheral neuropathy     Primary hypertension 2024    Bp poorly controlled today.  Needs refills for lisinopril  and HCTZ    Thoracic or lumbosacral neuritis or radiculitis, unspecified     bilateral L4-S1 radiculopathies    Vitamin D deficiency 2024     Social History     Socioeconomic History    Marital status:      Spouse name: Not on file    Number of children: Not on file    Years of education: Not on file    Highest education level: Not on file   Occupational History    Not on file   Tobacco Use    Smoking status: Former     Current packs/day: 0.00     Types: Cigarettes     Quit date: 2024     Years since quittin.1    Smokeless tobacco: Never    Tobacco comments:     .  Quit smoking 2022   Vaping Use    Vaping Use: Never used   Substance and Sexual Activity    Alcohol use: Not Currently     Alcohol/week: 0.5 oz     Types: 1 Standard drinks or equivalent per week     Comment: 2 per week    Drug use: No    Sexual activity: Yes     Partners: Male   Other Topics Concern    Not on file   Social History Narrative    Not on file     Social Determinants of Health     Financial Resource Strain: Not on file   Food Insecurity: Not on file   Transportation Needs: Not on file   Physical Activity: Not on file   Stress: Not on file   Social Connections: Not on file   Intimate Partner Violence: Not on file   Housing Stability: Not on file     Breast Cancer-related family history is not on file.  Past Surgical History:    Procedure Laterality Date    INJ,SACROILIAC,ANES/STEROID  10/18/2013    Performed by Jose Eduardo Archuleta D.O. at [facility]    INJ,SACROILIAC,ANES/STEROID  8/16/2013    Performed by Jose Eduardo Archuleta D.O. at [facility]    INJ,SACROILIAC,ANES/STEROID  8/16/2013    Performed by Jose Eduardo Archuleta D.O. at [facility]    NEURO DEST FACET L/S W/IG SNGL  4/19/2013    Performed by Jose Eduardo Archuleta D.O. at [facility]    NEURO DEST FACET L/S W/IG ADDL  4/19/2013    Performed by Jose Eduardo Archuleta D.O. at [facility]    NEURO DEST FACET L/S W/IG ADDL  4/19/2013    Performed by Jose Eduardo Archuleta D.O. at [facility]    NEURO DEST FACET L/S W/IG SNGL  3/29/2013    Performed by Jose Eduardo Archuleta D.O. at [facility]    NEURO DEST FACET L/S W/IG ADDL  3/29/2013    Performed by Jose Eduardo Archuleta D.O. at [facility]    NEURO DEST FACET L/S W/IG ADDL  3/29/2013    Performed by Jose Eduardo Archuleta D.O. at [facility]    NEURO DEST FACET L/S W/IG ADDL  3/29/2013    Performed by Jose Eduardo Archuleta D.O. at [facility]    INJ,SACROILIAC,ANES/STEROID  12/28/2012    Performed by Jose Eduardo Archuleta D.O. at [facility]    INJ,SACROILIAC,ANES/STEROID  11/9/2012    Performed by Jose Eduardo Archuleta D.O. at [facility]    THORACIC LAMINECTOMY  9/18/2012    Performed by King Leal M.D. at [facility]    SPINAL CORD STIMULATOR  9/18/2012    Performed by King Leal M.D. at [facility]    INJ,SACROILIAC,ANES/STEROID  7/27/2012    Performed by JOSE EDUARDO ARCHULETA at [facility]    INJ,SACROILIAC,ANES/STEROID  7/27/2012    Performed by JOSE EDUARDO ARCHULETA at [facility]    ID PERCUT IMPLNT NEUROELECT,EPIDURAL  6/22/2012    Performed by JOSE EDUARDO ARCHULETA at [facility]    ID PERCUT IMPLNT NEUROELECT,EPIDURAL   6/22/2012    Performed by JOSE EDUARDO JONES at [facility]    FUSION, SPINE, LUMBAR, PLIF  7/26/2011    Performed by DANIAL KABA at [facility]    LUMBAR DECOMPRESSION  7/26/2011    Performed by DANIAL KABA at [facility]    HARDWARE REMOVAL ORTHO  7/26/2011    Performed by DANIAL KABA at [facility]    S I JOINT FUSION  11/11/2010    Performed by DANIAL KABA at [facility]    GASTRIC BYPASS LAPAROSCOPIC  4/5/2010    Performed by GANSER, JOHN H at [facility]    HIATAL HERNIA REPAIR  4/5/2010    Performed by GANSER, JOHN H at [facility]    FUSION, SPINE, LUMBAR, PLIF  11/4/2009    Performed by DANIAL KABA at [facility]    LUMBAR DECOMPRESSION  11/4/2009    Performed by DANIAL KABA at [facility]    [place] REMOVAL OF OVARY/TUBE(S)  2005    left    SIGMOID COLECTOMY  2004    OTHER  1999    lasik surgery    TONSILLECTOMY  1986    CHOLECYSTECTOMY      GASTRIC BYPASS LAPAROSCOPIC      x2    [place] BREAST REDUCTION           ROS  Review of Systems   Constitutional: Negative.  Negative for fever, chills, weight loss, malaise/fatigue and diaphoresis.   HENT: Negative.  Negative for hearing loss, ear pain, nosebleeds, congestion, sore throat, neck pain, tinnitus and ear discharge.    Eyes: Negative.  Negative for blurred vision, double vision, photophobia, pain, discharge and redness.   Respiratory: Negative.  Negative for cough, hemoptysis, sputum production, shortness of breath, wheezing and stridor.    Cardiovascular: Negative.  Negative for chest pain, palpitations, orthopnea, claudication, leg swelling and PND.   Gastrointestinal: Negative.  Negative for nausea, vomiting, abdominal pain, diarrhea, constipation, blood in stool and melena.   Genitourinary: Negative.  Negative for dysuria, urgency, frequency, incontinence, hematuria and flank pain.   Musculoskeletal: Negative.  Negative  for myalgias, joint pain and falls.   Skin: Negative.  Negative for itching and rash.   Neurological: Negative.  Negative for dizziness, tingling, tremors, sensory change, speech change, focal weakness, seizures, loss of consciousness, weakness and headaches.   Endo/Heme/Allergies: Negative.  Negative for environmental allergies and polydipsia. Does not bruise/bleed easily.   Psychiatric/Behavioral: Negative.  Negative for depression, suicidal ideas, hallucinations, memory loss and substance abuse. The patient is not nervous/anxious and does have insomnia.    All other systems reviewed and are negative.      Objective     BP (!) 140/100 (BP Location: Left arm, Patient Position: Sitting, BP Cuff Size: Adult)   Pulse 92   Temp 36.7 °C (98 °F) (Temporal)   Wt 85.7 kg (189 lb)   LMP 08/03/1999   SpO2 98%   BMI 31.45 kg/m²      Physical Exam  Physical Exam   Vitals reviewed.  Constitutional: oriented to person, place, and time. appears well-developed and well-nourished. No distress.   HENT: Head: Normocephalic and atraumatic. Bilateral tympanic membranes wnl w/o bulging.  Right Ear: External ear normal. Left Ear: External ear normal. Nose: Nose normal.  Mouth/Throat: Oropharynx is clear and moist. No oropharyngeal exudate. jennifer tm wnl. Eyes: Conjunctivae and EOM are normal. Pupils are equal, round, and reactive to light. Right eye exhibits no discharge. Left eye exhibits no discharge. No scleral icterus.    Neck: Normal range of motion. Neck supple. No JVD present.   Cardiovascular: Normal rate, regular rhythm, normal heart sounds and intact distal pulses.  Exam reveals no gallop and no friction rub.  No murmur heard.  No carotid bruits   Pulmonary/Chest: Effort normal and breath sounds normal. No stridor. No respiratory distress. no wheezes or rales. exhibits no tenderness.   Abdominal: Soft. Bowel sounds are normal. exhibits no distension and no mass. No tenderness. no rebound and no guarding.   Musculoskeletal:  Normal range of motion. exhibits no edema or tenderness.  jennifer pedal pulses 2+.  Lymphadenopathy:  no cervical or supraclavicular adenopathy.   Neurological: alert and oriented to person, place, and time. has normal reflexes. displays normal reflexes. No cranial nerve deficit. exhibits normal muscle tone. Coordination normal.   Skin: Skin is warm and dry. No rash noted. no diaphoresis. No erythema. No pallor.   Psychiatric: normal mood and affect. behavior is normal.          Assessment & Plan        1. Primary hypertension  hydroCHLOROthiazide 25 MG Tab    lisinopril (PRINIVIL) 20 MG Tab    refill lisinopril & HCTZ. monitor BP. do lab, f/u 1 mo for review lab & BP ck      2. Anxiety      pt relates anxiety to insomnia. tx insomnia. do lab, f/u for review 1 mo for lab review & sx eval. consider anxiety tx if not improved      3. Hyperlipidemia LDL goal <100  atorvastatin (LIPITOR) 10 MG Tab    RF & restart lipitor. continue healthy diet. enc pt to start regular exercise. huma lab and f/u 1 mo for review.      4. Gastroesophageal reflux disease with esophagitis without hemorrhage  Referral to Gastroenterology    omeprazole (PRILOSEC) 20 MG delayed-release capsule    sucralfate (CARAFATE) 1 GM Tab    freq significant sx. start omeprazole and carafate. f/u 1 mo for sx eval      5. Hiatus hernia syndrome      no current sx but having significant GERD.  will tx w/omeprazole & carafate. f/u 1 mo for sx eval.  ck h pylori if not improved      6. Arthritis      mild sx only controlled w/otc meds      7. Intractable migraine without aura and without status migrainosus      occur only rarely. stable on otc tx      8. Low back pain, unspecified back pain laterality, unspecified chronicity, unspecified whether sciatica present      no significant pain or tx needed      9. Mild episode of recurrent major depressive disorder (HCC)      not currently on med. consider tx if sx not improved w/insomnia control      10. Multifocal  motor neuropathy (HCC)      sx controlled with yoga, voltaren gel and meditation      11. DDD (degenerative disc disease), lumbar      mild sx only. stable on otc meds      12. Vitamin D deficiency      not on otc supplement. will do updated lab. f/u for review      13. Primary insomnia  doxepin (SINEQUAN) 10 MG Cap    try doxepin 10 mg hs. f/u 1 mo for sx eval.      14. Esophageal dysphagia  Referral to Gastroenterology    omeprazole (PRILOSEC) 20 MG delayed-release capsule    sucralfate (CARAFATE) 1 GM Tab    refer GI for poss esophageal stricture      15. Diverticulosis      no current sx or tx needed. enc pt to include fiber in her diet.      16. Post menopausal problems  DS-BONE DENSITY STUDY (DEXA)    dexa scan & mammo ordered      17. Screening for malignant neoplasm of colon      refer GI for colonoscopy along with esophageal dysphagia      18. Encounter for screening mammogram for malignant neoplasm of breast  MA-SCREENING MAMMO BILAT W/CAD

## 2024-03-13 ENCOUNTER — HOSPITAL ENCOUNTER (OUTPATIENT)
Dept: RADIOLOGY | Facility: MEDICAL CENTER | Age: 59
End: 2024-03-13
Attending: NURSE PRACTITIONER
Payer: COMMERCIAL

## 2024-03-13 ENCOUNTER — HOSPITAL ENCOUNTER (OUTPATIENT)
Dept: LAB | Facility: MEDICAL CENTER | Age: 59
End: 2024-03-13
Attending: NURSE PRACTITIONER
Payer: COMMERCIAL

## 2024-03-13 DIAGNOSIS — E55.9 VITAMIN D DEFICIENCY: ICD-10-CM

## 2024-03-13 DIAGNOSIS — Z13.89 SCREENING FOR MULTIPLE CONDITIONS: ICD-10-CM

## 2024-03-13 DIAGNOSIS — N95.9 POST MENOPAUSAL PROBLEMS: ICD-10-CM

## 2024-03-13 DIAGNOSIS — Z13.29 SCREENING FOR THYROID DISORDER: ICD-10-CM

## 2024-03-13 DIAGNOSIS — Z12.31 ENCOUNTER FOR SCREENING MAMMOGRAM FOR MALIGNANT NEOPLASM OF BREAST: ICD-10-CM

## 2024-03-13 DIAGNOSIS — Z13.220 SCREENING FOR HYPERLIPIDEMIA: ICD-10-CM

## 2024-03-13 DIAGNOSIS — Z13.0 SCREENING, ANEMIA, DEFICIENCY, IRON: ICD-10-CM

## 2024-03-13 LAB
25(OH)D3 SERPL-MCNC: 12 NG/ML (ref 30–100)
ALBUMIN SERPL BCP-MCNC: 4.6 G/DL (ref 3.2–4.9)
ALBUMIN/GLOB SERPL: 1.9 G/DL
ALP SERPL-CCNC: 151 U/L (ref 30–99)
ALT SERPL-CCNC: 28 U/L (ref 2–50)
ANION GAP SERPL CALC-SCNC: 15 MMOL/L (ref 7–16)
AST SERPL-CCNC: 58 U/L (ref 12–45)
BASOPHILS # BLD AUTO: 0.6 % (ref 0–1.8)
BASOPHILS # BLD: 0.04 K/UL (ref 0–0.12)
BILIRUB SERPL-MCNC: 0.9 MG/DL (ref 0.1–1.5)
BUN SERPL-MCNC: 15 MG/DL (ref 8–22)
CALCIUM ALBUM COR SERPL-MCNC: 9 MG/DL (ref 8.5–10.5)
CALCIUM SERPL-MCNC: 9.5 MG/DL (ref 8.5–10.5)
CHLORIDE SERPL-SCNC: 99 MMOL/L (ref 96–112)
CHOLEST SERPL-MCNC: 243 MG/DL (ref 100–199)
CO2 SERPL-SCNC: 26 MMOL/L (ref 20–33)
CREAT SERPL-MCNC: 1.03 MG/DL (ref 0.5–1.4)
EOSINOPHIL # BLD AUTO: 0.13 K/UL (ref 0–0.51)
EOSINOPHIL NFR BLD: 1.9 % (ref 0–6.9)
ERYTHROCYTE [DISTWIDTH] IN BLOOD BY AUTOMATED COUNT: 61.9 FL (ref 35.9–50)
FASTING STATUS PATIENT QL REPORTED: NORMAL
GFR SERPLBLD CREATININE-BSD FMLA CKD-EPI: 63 ML/MIN/1.73 M 2
GLOBULIN SER CALC-MCNC: 2.4 G/DL (ref 1.9–3.5)
GLUCOSE SERPL-MCNC: 99 MG/DL (ref 65–99)
HCT VFR BLD AUTO: 41.5 % (ref 37–47)
HDLC SERPL-MCNC: 114 MG/DL
HGB BLD-MCNC: 12.8 G/DL (ref 12–16)
IMM GRANULOCYTES # BLD AUTO: 0.02 K/UL (ref 0–0.11)
IMM GRANULOCYTES NFR BLD AUTO: 0.3 % (ref 0–0.9)
LDLC SERPL CALC-MCNC: 104 MG/DL
LYMPHOCYTES # BLD AUTO: 2.02 K/UL (ref 1–4.8)
LYMPHOCYTES NFR BLD: 29.1 % (ref 22–41)
MCH RBC QN AUTO: 28.1 PG (ref 27–33)
MCHC RBC AUTO-ENTMCNC: 30.8 G/DL (ref 32.2–35.5)
MCV RBC AUTO: 91.2 FL (ref 81.4–97.8)
MONOCYTES # BLD AUTO: 0.44 K/UL (ref 0–0.85)
MONOCYTES NFR BLD AUTO: 6.3 % (ref 0–13.4)
NEUTROPHILS # BLD AUTO: 4.28 K/UL (ref 1.82–7.42)
NEUTROPHILS NFR BLD: 61.8 % (ref 44–72)
NRBC # BLD AUTO: 0 K/UL
NRBC BLD-RTO: 0 /100 WBC (ref 0–0.2)
PLATELET # BLD AUTO: 364 K/UL (ref 164–446)
PMV BLD AUTO: 10.6 FL (ref 9–12.9)
POTASSIUM SERPL-SCNC: 4.1 MMOL/L (ref 3.6–5.5)
PROT SERPL-MCNC: 7 G/DL (ref 6–8.2)
RBC # BLD AUTO: 4.55 M/UL (ref 4.2–5.4)
SODIUM SERPL-SCNC: 140 MMOL/L (ref 135–145)
TRIGL SERPL-MCNC: 125 MG/DL (ref 0–149)
TSH SERPL DL<=0.005 MIU/L-ACNC: 1.29 UIU/ML (ref 0.38–5.33)
WBC # BLD AUTO: 6.9 K/UL (ref 4.8–10.8)

## 2024-03-13 PROCEDURE — 80061 LIPID PANEL: CPT

## 2024-03-13 PROCEDURE — 77080 DXA BONE DENSITY AXIAL: CPT

## 2024-03-13 PROCEDURE — 77067 SCR MAMMO BI INCL CAD: CPT

## 2024-03-13 PROCEDURE — 84443 ASSAY THYROID STIM HORMONE: CPT

## 2024-03-13 PROCEDURE — 80053 COMPREHEN METABOLIC PANEL: CPT

## 2024-03-13 PROCEDURE — 82306 VITAMIN D 25 HYDROXY: CPT

## 2024-03-13 PROCEDURE — 85025 COMPLETE CBC W/AUTO DIFF WBC: CPT

## 2024-03-13 PROCEDURE — 36415 COLL VENOUS BLD VENIPUNCTURE: CPT

## 2024-04-07 ENCOUNTER — APPOINTMENT (OUTPATIENT)
Dept: RADIOLOGY | Facility: MEDICAL CENTER | Age: 59
End: 2024-04-07
Attending: EMERGENCY MEDICINE
Payer: COMMERCIAL

## 2024-04-07 ENCOUNTER — HOSPITAL ENCOUNTER (OUTPATIENT)
Facility: MEDICAL CENTER | Age: 59
End: 2024-04-08
Attending: EMERGENCY MEDICINE | Admitting: INTERNAL MEDICINE
Payer: COMMERCIAL

## 2024-04-07 DIAGNOSIS — L02.419 CUTANEOUS ABSCESS OF UPPER EXTREMITY, UNSPECIFIED LATERALITY: ICD-10-CM

## 2024-04-07 DIAGNOSIS — L03.114 CELLULITIS OF LEFT UPPER EXTREMITY: ICD-10-CM

## 2024-04-07 DIAGNOSIS — L03.116 CELLULITIS OF LEFT LOWER EXTREMITY: ICD-10-CM

## 2024-04-07 DIAGNOSIS — I10 PRIMARY HYPERTENSION: ICD-10-CM

## 2024-04-07 DIAGNOSIS — L02.91 ABSCESS: ICD-10-CM

## 2024-04-07 LAB
ALBUMIN SERPL BCP-MCNC: 4 G/DL (ref 3.2–4.9)
ALBUMIN/GLOB SERPL: 1.8 G/DL
ALP SERPL-CCNC: 130 U/L (ref 30–99)
ALT SERPL-CCNC: 35 U/L (ref 2–50)
ANION GAP SERPL CALC-SCNC: 12 MMOL/L (ref 7–16)
APPEARANCE UR: CLEAR
AST SERPL-CCNC: 80 U/L (ref 12–45)
BASOPHILS # BLD AUTO: 0.6 % (ref 0–1.8)
BASOPHILS # BLD: 0.03 K/UL (ref 0–0.12)
BILIRUB SERPL-MCNC: 0.2 MG/DL (ref 0.1–1.5)
BILIRUB UR QL STRIP.AUTO: NEGATIVE
BUN SERPL-MCNC: 11 MG/DL (ref 8–22)
CALCIUM ALBUM COR SERPL-MCNC: 8.6 MG/DL (ref 8.5–10.5)
CALCIUM SERPL-MCNC: 8.6 MG/DL (ref 8.5–10.5)
CHLORIDE SERPL-SCNC: 105 MMOL/L (ref 96–112)
CO2 SERPL-SCNC: 25 MMOL/L (ref 20–33)
COLOR UR: YELLOW
CREAT SERPL-MCNC: 0.72 MG/DL (ref 0.5–1.4)
CRP SERPL HS-MCNC: <0.3 MG/DL (ref 0–0.75)
EOSINOPHIL # BLD AUTO: 0.15 K/UL (ref 0–0.51)
EOSINOPHIL NFR BLD: 3.2 % (ref 0–6.9)
ERYTHROCYTE [DISTWIDTH] IN BLOOD BY AUTOMATED COUNT: 59.7 FL (ref 35.9–50)
ERYTHROCYTE [SEDIMENTATION RATE] IN BLOOD BY WESTERGREN METHOD: 5 MM/HOUR (ref 0–25)
GFR SERPLBLD CREATININE-BSD FMLA CKD-EPI: 96 ML/MIN/1.73 M 2
GLOBULIN SER CALC-MCNC: 2.2 G/DL (ref 1.9–3.5)
GLUCOSE SERPL-MCNC: 99 MG/DL (ref 65–99)
GLUCOSE UR STRIP.AUTO-MCNC: NEGATIVE MG/DL
HCT VFR BLD AUTO: 36.4 % (ref 37–47)
HGB BLD-MCNC: 11.5 G/DL (ref 12–16)
IMM GRANULOCYTES # BLD AUTO: 0.01 K/UL (ref 0–0.11)
IMM GRANULOCYTES NFR BLD AUTO: 0.2 % (ref 0–0.9)
KETONES UR STRIP.AUTO-MCNC: NEGATIVE MG/DL
LACTATE SERPL-SCNC: 1.2 MMOL/L (ref 0.5–2)
LEUKOCYTE ESTERASE UR QL STRIP.AUTO: NEGATIVE
LYMPHOCYTES # BLD AUTO: 2.66 K/UL (ref 1–4.8)
LYMPHOCYTES NFR BLD: 56.8 % (ref 22–41)
MCH RBC QN AUTO: 28.6 PG (ref 27–33)
MCHC RBC AUTO-ENTMCNC: 31.6 G/DL (ref 32.2–35.5)
MCV RBC AUTO: 90.5 FL (ref 81.4–97.8)
MICRO URNS: NORMAL
MONOCYTES # BLD AUTO: 0.37 K/UL (ref 0–0.85)
MONOCYTES NFR BLD AUTO: 7.9 % (ref 0–13.4)
NEUTROPHILS # BLD AUTO: 1.46 K/UL (ref 1.82–7.42)
NEUTROPHILS NFR BLD: 31.3 % (ref 44–72)
NITRITE UR QL STRIP.AUTO: NEGATIVE
NRBC # BLD AUTO: 0 K/UL
NRBC BLD-RTO: 0 /100 WBC (ref 0–0.2)
PH UR STRIP.AUTO: 6 [PH] (ref 5–8)
PLATELET # BLD AUTO: 345 K/UL (ref 164–446)
PMV BLD AUTO: 9.6 FL (ref 9–12.9)
POTASSIUM SERPL-SCNC: 4 MMOL/L (ref 3.6–5.5)
PROT SERPL-MCNC: 6.2 G/DL (ref 6–8.2)
PROT UR QL STRIP: NEGATIVE MG/DL
RBC # BLD AUTO: 4.02 M/UL (ref 4.2–5.4)
RBC UR QL AUTO: NEGATIVE
SCCMEC + MECA PNL NOSE NAA+PROBE: NEGATIVE
SODIUM SERPL-SCNC: 142 MMOL/L (ref 135–145)
SP GR UR STRIP.AUTO: 1.01
T PALLIDUM AB SER QL IA: NORMAL
UROBILINOGEN UR STRIP.AUTO-MCNC: 0.2 MG/DL
WBC # BLD AUTO: 4.7 K/UL (ref 4.8–10.8)

## 2024-04-07 PROCEDURE — 85025 COMPLETE CBC W/AUTO DIFF WBC: CPT

## 2024-04-07 PROCEDURE — 700105 HCHG RX REV CODE 258: Performed by: EMERGENCY MEDICINE

## 2024-04-07 PROCEDURE — 87641 MR-STAPH DNA AMP PROBE: CPT

## 2024-04-07 PROCEDURE — 99285 EMERGENCY DEPT VISIT HI MDM: CPT

## 2024-04-07 PROCEDURE — 96375 TX/PRO/DX INJ NEW DRUG ADDON: CPT

## 2024-04-07 PROCEDURE — 71045 X-RAY EXAM CHEST 1 VIEW: CPT

## 2024-04-07 PROCEDURE — 86140 C-REACTIVE PROTEIN: CPT

## 2024-04-07 PROCEDURE — 700111 HCHG RX REV CODE 636 W/ 250 OVERRIDE (IP): Performed by: INTERNAL MEDICINE

## 2024-04-07 PROCEDURE — 83605 ASSAY OF LACTIC ACID: CPT

## 2024-04-07 PROCEDURE — 96365 THER/PROPH/DIAG IV INF INIT: CPT

## 2024-04-07 PROCEDURE — A9270 NON-COVERED ITEM OR SERVICE: HCPCS | Performed by: INTERNAL MEDICINE

## 2024-04-07 PROCEDURE — 700101 HCHG RX REV CODE 250: Performed by: INTERNAL MEDICINE

## 2024-04-07 PROCEDURE — 87040 BLOOD CULTURE FOR BACTERIA: CPT | Mod: 91

## 2024-04-07 PROCEDURE — 85652 RBC SED RATE AUTOMATED: CPT

## 2024-04-07 PROCEDURE — 99223 1ST HOSP IP/OBS HIGH 75: CPT | Performed by: INTERNAL MEDICINE

## 2024-04-07 PROCEDURE — 700111 HCHG RX REV CODE 636 W/ 250 OVERRIDE (IP): Mod: JZ | Performed by: EMERGENCY MEDICINE

## 2024-04-07 PROCEDURE — 87086 URINE CULTURE/COLONY COUNT: CPT

## 2024-04-07 PROCEDURE — 700102 HCHG RX REV CODE 250 W/ 637 OVERRIDE(OP): Performed by: INTERNAL MEDICINE

## 2024-04-07 PROCEDURE — 80053 COMPREHEN METABOLIC PANEL: CPT

## 2024-04-07 PROCEDURE — 87205 SMEAR GRAM STAIN: CPT

## 2024-04-07 PROCEDURE — G0378 HOSPITAL OBSERVATION PER HR: HCPCS

## 2024-04-07 PROCEDURE — 81003 URINALYSIS AUTO W/O SCOPE: CPT

## 2024-04-07 PROCEDURE — 86780 TREPONEMA PALLIDUM: CPT

## 2024-04-07 PROCEDURE — 87070 CULTURE OTHR SPECIMN AEROBIC: CPT

## 2024-04-07 PROCEDURE — 36415 COLL VENOUS BLD VENIPUNCTURE: CPT

## 2024-04-07 RX ORDER — OXYCODONE HYDROCHLORIDE 5 MG/1
5 TABLET ORAL
Status: DISCONTINUED | OUTPATIENT
Start: 2024-04-07 | End: 2024-04-08 | Stop reason: HOSPADM

## 2024-04-07 RX ORDER — OMEPRAZOLE 20 MG/1
20 CAPSULE, DELAYED RELEASE ORAL DAILY
Status: DISCONTINUED | OUTPATIENT
Start: 2024-04-08 | End: 2024-04-08 | Stop reason: HOSPADM

## 2024-04-07 RX ORDER — ONDANSETRON 2 MG/ML
4 INJECTION INTRAMUSCULAR; INTRAVENOUS EVERY 4 HOURS PRN
Status: DISCONTINUED | OUTPATIENT
Start: 2024-04-07 | End: 2024-04-08 | Stop reason: HOSPADM

## 2024-04-07 RX ORDER — AMOXICILLIN AND CLAVULANATE POTASSIUM 875; 125 MG/1; MG/1
1 TABLET, FILM COATED ORAL 2 TIMES DAILY
Status: ON HOLD | COMMUNITY
End: 2024-04-08

## 2024-04-07 RX ORDER — PROMETHAZINE HYDROCHLORIDE 12.5 MG/1
12.5-25 SUPPOSITORY RECTAL EVERY 4 HOURS PRN
Status: DISCONTINUED | OUTPATIENT
Start: 2024-04-07 | End: 2024-04-08 | Stop reason: HOSPADM

## 2024-04-07 RX ORDER — OXYCODONE HYDROCHLORIDE 10 MG/1
10 TABLET ORAL
Status: DISCONTINUED | OUTPATIENT
Start: 2024-04-07 | End: 2024-04-08 | Stop reason: HOSPADM

## 2024-04-07 RX ORDER — LISINOPRIL 40 MG/1
40 TABLET ORAL DAILY
COMMUNITY
End: 2024-04-16 | Stop reason: SDUPTHER

## 2024-04-07 RX ORDER — HYDROXYZINE HYDROCHLORIDE 25 MG/1
25 TABLET, FILM COATED ORAL 3 TIMES DAILY
Status: DISCONTINUED | OUTPATIENT
Start: 2024-04-07 | End: 2024-04-08 | Stop reason: HOSPADM

## 2024-04-07 RX ORDER — ONDANSETRON 4 MG/1
4 TABLET, ORALLY DISINTEGRATING ORAL EVERY 4 HOURS PRN
Status: DISCONTINUED | OUTPATIENT
Start: 2024-04-07 | End: 2024-04-08 | Stop reason: HOSPADM

## 2024-04-07 RX ORDER — LISINOPRIL 20 MG/1
40 TABLET ORAL DAILY
Status: DISCONTINUED | OUTPATIENT
Start: 2024-04-08 | End: 2024-04-08 | Stop reason: HOSPADM

## 2024-04-07 RX ORDER — ENOXAPARIN SODIUM 100 MG/ML
40 INJECTION SUBCUTANEOUS DAILY
Status: DISCONTINUED | OUTPATIENT
Start: 2024-04-08 | End: 2024-04-08 | Stop reason: HOSPADM

## 2024-04-07 RX ORDER — PROCHLORPERAZINE EDISYLATE 5 MG/ML
5-10 INJECTION INTRAMUSCULAR; INTRAVENOUS EVERY 4 HOURS PRN
Status: DISCONTINUED | OUTPATIENT
Start: 2024-04-07 | End: 2024-04-08 | Stop reason: HOSPADM

## 2024-04-07 RX ORDER — ONDANSETRON 2 MG/ML
4 INJECTION INTRAMUSCULAR; INTRAVENOUS ONCE
Status: COMPLETED | OUTPATIENT
Start: 2024-04-07 | End: 2024-04-07

## 2024-04-07 RX ORDER — ALPRAZOLAM 0.25 MG/1
0.25 TABLET ORAL
Status: DISCONTINUED | OUTPATIENT
Start: 2024-04-07 | End: 2024-04-08 | Stop reason: HOSPADM

## 2024-04-07 RX ORDER — AMOXICILLIN 250 MG
2 CAPSULE ORAL EVERY EVENING
Status: DISCONTINUED | OUTPATIENT
Start: 2024-04-07 | End: 2024-04-08 | Stop reason: HOSPADM

## 2024-04-07 RX ORDER — ATORVASTATIN CALCIUM 10 MG/1
10 TABLET, FILM COATED ORAL NIGHTLY
Status: DISCONTINUED | OUTPATIENT
Start: 2024-04-07 | End: 2024-04-08 | Stop reason: HOSPADM

## 2024-04-07 RX ORDER — PROMETHAZINE HYDROCHLORIDE 25 MG/1
12.5-25 TABLET ORAL EVERY 4 HOURS PRN
Status: DISCONTINUED | OUTPATIENT
Start: 2024-04-07 | End: 2024-04-08 | Stop reason: HOSPADM

## 2024-04-07 RX ORDER — SODIUM CHLORIDE, SODIUM LACTATE, POTASSIUM CHLORIDE, AND CALCIUM CHLORIDE .6; .31; .03; .02 G/100ML; G/100ML; G/100ML; G/100ML
30 INJECTION, SOLUTION INTRAVENOUS ONCE
Status: COMPLETED | OUTPATIENT
Start: 2024-04-07 | End: 2024-04-07

## 2024-04-07 RX ORDER — POLYETHYLENE GLYCOL 3350 17 G/17G
1 POWDER, FOR SOLUTION ORAL
Status: DISCONTINUED | OUTPATIENT
Start: 2024-04-07 | End: 2024-04-08 | Stop reason: HOSPADM

## 2024-04-07 RX ORDER — LABETALOL HYDROCHLORIDE 5 MG/ML
10 INJECTION, SOLUTION INTRAVENOUS EVERY 4 HOURS PRN
Status: DISCONTINUED | OUTPATIENT
Start: 2024-04-07 | End: 2024-04-08 | Stop reason: HOSPADM

## 2024-04-07 RX ORDER — MORPHINE SULFATE 4 MG/ML
4 INJECTION INTRAVENOUS ONCE
Status: COMPLETED | OUTPATIENT
Start: 2024-04-07 | End: 2024-04-07

## 2024-04-07 RX ORDER — ACETAMINOPHEN 325 MG/1
650 TABLET ORAL EVERY 6 HOURS PRN
Status: DISCONTINUED | OUTPATIENT
Start: 2024-04-07 | End: 2024-04-08 | Stop reason: HOSPADM

## 2024-04-07 RX ORDER — MORPHINE SULFATE 4 MG/ML
4 INJECTION INTRAVENOUS
Status: DISCONTINUED | OUTPATIENT
Start: 2024-04-07 | End: 2024-04-08 | Stop reason: HOSPADM

## 2024-04-07 RX ORDER — DOXEPIN HYDROCHLORIDE 10 MG/1
10 CAPSULE ORAL NIGHTLY
Status: DISCONTINUED | OUTPATIENT
Start: 2024-04-07 | End: 2024-04-08 | Stop reason: HOSPADM

## 2024-04-07 RX ADMIN — OXYCODONE 5 MG: 5 TABLET ORAL at 22:34

## 2024-04-07 RX ADMIN — HYDROXYZINE HYDROCHLORIDE 25 MG: 25 TABLET, FILM COATED ORAL at 22:57

## 2024-04-07 RX ADMIN — DOXEPIN HYDROCHLORIDE 10 MG: 10 CAPSULE ORAL at 21:41

## 2024-04-07 RX ADMIN — ALPRAZOLAM 0.25 MG: 0.25 TABLET ORAL at 22:34

## 2024-04-07 RX ADMIN — SODIUM CHLORIDE, POTASSIUM CHLORIDE, SODIUM LACTATE AND CALCIUM CHLORIDE 1746 ML: 600; 310; 30; 20 INJECTION, SOLUTION INTRAVENOUS at 19:46

## 2024-04-07 RX ADMIN — VANCOMYCIN HYDROCHLORIDE 2250 MG: 5 INJECTION, POWDER, LYOPHILIZED, FOR SOLUTION INTRAVENOUS at 20:31

## 2024-04-07 RX ADMIN — ONDANSETRON 4 MG: 2 INJECTION INTRAMUSCULAR; INTRAVENOUS at 20:24

## 2024-04-07 RX ADMIN — MORPHINE SULFATE 4 MG: 4 INJECTION INTRAVENOUS at 20:23

## 2024-04-07 RX ADMIN — CEFTRIAXONE SODIUM 2000 MG: 10 INJECTION, POWDER, FOR SOLUTION INTRAVENOUS at 22:58

## 2024-04-07 ASSESSMENT — ENCOUNTER SYMPTOMS
HEARTBURN: 0
COUGH: 0
SPUTUM PRODUCTION: 0
FLANK PAIN: 0
BRUISES/BLEEDS EASILY: 0
BLURRED VISION: 0
BACK PAIN: 0
WEIGHT LOSS: 0
HALLUCINATIONS: 0
FOCAL WEAKNESS: 0
NECK PAIN: 0
NAUSEA: 0
ORTHOPNEA: 0
SPEECH CHANGE: 0
PHOTOPHOBIA: 0
POLYDIPSIA: 0
FEVER: 0
TREMORS: 0
DOUBLE VISION: 0
HEMOPTYSIS: 0
PALPITATIONS: 0
CHILLS: 0
HEADACHES: 0
NERVOUS/ANXIOUS: 1
VOMITING: 0

## 2024-04-07 ASSESSMENT — PATIENT HEALTH QUESTIONNAIRE - PHQ9
SUM OF ALL RESPONSES TO PHQ9 QUESTIONS 1 AND 2: 0
1. LITTLE INTEREST OR PLEASURE IN DOING THINGS: NOT AT ALL
2. FEELING DOWN, DEPRESSED, IRRITABLE, OR HOPELESS: NOT AT ALL

## 2024-04-07 ASSESSMENT — LIFESTYLE VARIABLES
HAVE PEOPLE ANNOYED YOU BY CRITICIZING YOUR DRINKING: NO
TOTAL SCORE: 0
TOTAL SCORE: 0
SUBSTANCE_ABUSE: 0
TOTAL SCORE: 0
CONSUMPTION TOTAL: NEGATIVE
AVERAGE NUMBER OF DAYS PER WEEK YOU HAVE A DRINK CONTAINING ALCOHOL: 0
HOW MANY TIMES IN THE PAST YEAR HAVE YOU HAD 5 OR MORE DRINKS IN A DAY: 0
ON A TYPICAL DAY WHEN YOU DRINK ALCOHOL HOW MANY DRINKS DO YOU HAVE: 0
EVER FELT BAD OR GUILTY ABOUT YOUR DRINKING: NO
DOES PATIENT WANT TO STOP DRINKING: NO
EVER HAD A DRINK FIRST THING IN THE MORNING TO STEADY YOUR NERVES TO GET RID OF A HANGOVER: NO
HAVE YOU EVER FELT YOU SHOULD CUT DOWN ON YOUR DRINKING: NO
ALCOHOL_USE: NO

## 2024-04-07 ASSESSMENT — FIBROSIS 4 INDEX
FIB4 SCORE: 1.78
FIB4 SCORE: 2.31

## 2024-04-07 ASSESSMENT — PAIN DESCRIPTION - PAIN TYPE: TYPE: ACUTE PAIN

## 2024-04-08 ENCOUNTER — PHARMACY VISIT (OUTPATIENT)
Dept: PHARMACY | Facility: MEDICAL CENTER | Age: 59
End: 2024-04-08
Payer: COMMERCIAL

## 2024-04-08 VITALS
HEART RATE: 73 BPM | BODY MASS INDEX: 32.54 KG/M2 | WEIGHT: 195.33 LBS | HEIGHT: 65 IN | DIASTOLIC BLOOD PRESSURE: 80 MMHG | SYSTOLIC BLOOD PRESSURE: 127 MMHG | RESPIRATION RATE: 16 BRPM | OXYGEN SATURATION: 96 % | TEMPERATURE: 97.8 F

## 2024-04-08 LAB
ALBUMIN SERPL BCP-MCNC: 3.2 G/DL (ref 3.2–4.9)
ALBUMIN/GLOB SERPL: 1.8 G/DL
ALP SERPL-CCNC: 101 U/L (ref 30–99)
ALT SERPL-CCNC: 26 U/L (ref 2–50)
ANION GAP SERPL CALC-SCNC: 9 MMOL/L (ref 7–16)
AST SERPL-CCNC: 48 U/L (ref 12–45)
BASOPHILS # BLD AUTO: 0.8 % (ref 0–1.8)
BASOPHILS # BLD: 0.03 K/UL (ref 0–0.12)
BILIRUB SERPL-MCNC: <0.2 MG/DL (ref 0.1–1.5)
BUN SERPL-MCNC: 11 MG/DL (ref 8–22)
CALCIUM ALBUM COR SERPL-MCNC: 8.7 MG/DL (ref 8.5–10.5)
CALCIUM SERPL-MCNC: 8.1 MG/DL (ref 8.5–10.5)
CHLORIDE SERPL-SCNC: 107 MMOL/L (ref 96–112)
CO2 SERPL-SCNC: 27 MMOL/L (ref 20–33)
CREAT SERPL-MCNC: 0.74 MG/DL (ref 0.5–1.4)
EOSINOPHIL # BLD AUTO: 0.14 K/UL (ref 0–0.51)
EOSINOPHIL NFR BLD: 3.6 % (ref 0–6.9)
ERYTHROCYTE [DISTWIDTH] IN BLOOD BY AUTOMATED COUNT: 60.7 FL (ref 35.9–50)
GFR SERPLBLD CREATININE-BSD FMLA CKD-EPI: 93 ML/MIN/1.73 M 2
GLOBULIN SER CALC-MCNC: 1.8 G/DL (ref 1.9–3.5)
GLUCOSE SERPL-MCNC: 85 MG/DL (ref 65–99)
GRAM STN SPEC: NORMAL
HAV IGM SERPL QL IA: NORMAL
HBV CORE IGM SER QL: NORMAL
HBV SURFACE AG SER QL: NORMAL
HCT VFR BLD AUTO: 30.7 % (ref 37–47)
HCV AB SER QL: NORMAL
HGB BLD-MCNC: 9.5 G/DL (ref 12–16)
HIV 1+2 AB+HIV1 P24 AG SERPL QL IA: NORMAL
IMM GRANULOCYTES # BLD AUTO: 0.01 K/UL (ref 0–0.11)
IMM GRANULOCYTES NFR BLD AUTO: 0.3 % (ref 0–0.9)
LYMPHOCYTES # BLD AUTO: 2.33 K/UL (ref 1–4.8)
LYMPHOCYTES NFR BLD: 59.9 % (ref 22–41)
MCH RBC QN AUTO: 28.4 PG (ref 27–33)
MCHC RBC AUTO-ENTMCNC: 30.9 G/DL (ref 32.2–35.5)
MCV RBC AUTO: 91.6 FL (ref 81.4–97.8)
MONOCYTES # BLD AUTO: 0.36 K/UL (ref 0–0.85)
MONOCYTES NFR BLD AUTO: 9.3 % (ref 0–13.4)
NEUTROPHILS # BLD AUTO: 1.02 K/UL (ref 1.82–7.42)
NEUTROPHILS NFR BLD: 26.1 % (ref 44–72)
NRBC # BLD AUTO: 0 K/UL
NRBC BLD-RTO: 0 /100 WBC (ref 0–0.2)
PLATELET # BLD AUTO: 276 K/UL (ref 164–446)
PMV BLD AUTO: 9.4 FL (ref 9–12.9)
POTASSIUM SERPL-SCNC: 4 MMOL/L (ref 3.6–5.5)
PROT SERPL-MCNC: 5 G/DL (ref 6–8.2)
RBC # BLD AUTO: 3.35 M/UL (ref 4.2–5.4)
SIGNIFICANT IND 70042: NORMAL
SITE SITE: NORMAL
SODIUM SERPL-SCNC: 143 MMOL/L (ref 135–145)
SOURCE SOURCE: NORMAL
WBC # BLD AUTO: 3.9 K/UL (ref 4.8–10.8)

## 2024-04-08 PROCEDURE — 700111 HCHG RX REV CODE 636 W/ 250 OVERRIDE (IP): Performed by: INTERNAL MEDICINE

## 2024-04-08 PROCEDURE — RXMED WILLOW AMBULATORY MEDICATION CHARGE

## 2024-04-08 PROCEDURE — 36415 COLL VENOUS BLD VENIPUNCTURE: CPT

## 2024-04-08 PROCEDURE — 87389 HIV-1 AG W/HIV-1&-2 AB AG IA: CPT

## 2024-04-08 PROCEDURE — A9270 NON-COVERED ITEM OR SERVICE: HCPCS | Performed by: INTERNAL MEDICINE

## 2024-04-08 PROCEDURE — 96366 THER/PROPH/DIAG IV INF ADDON: CPT

## 2024-04-08 PROCEDURE — 85025 COMPLETE CBC W/AUTO DIFF WBC: CPT

## 2024-04-08 PROCEDURE — 80053 COMPREHEN METABOLIC PANEL: CPT

## 2024-04-08 PROCEDURE — 700102 HCHG RX REV CODE 250 W/ 637 OVERRIDE(OP): Performed by: INTERNAL MEDICINE

## 2024-04-08 PROCEDURE — G0378 HOSPITAL OBSERVATION PER HR: HCPCS

## 2024-04-08 PROCEDURE — 700105 HCHG RX REV CODE 258: Performed by: INTERNAL MEDICINE

## 2024-04-08 PROCEDURE — 99239 HOSP IP/OBS DSCHRG MGMT >30: CPT | Mod: FS | Performed by: STUDENT IN AN ORGANIZED HEALTH CARE EDUCATION/TRAINING PROGRAM

## 2024-04-08 PROCEDURE — 80074 ACUTE HEPATITIS PANEL: CPT

## 2024-04-08 RX ORDER — AMOXICILLIN AND CLAVULANATE POTASSIUM 875; 125 MG/1; MG/1
1 TABLET, FILM COATED ORAL 2 TIMES DAILY
Qty: 10 TABLET | Refills: 0 | Status: SHIPPED | OUTPATIENT
Start: 2024-04-08 | End: 2024-04-13

## 2024-04-08 RX ORDER — GABAPENTIN 100 MG/1
100 CAPSULE ORAL 3 TIMES DAILY
Status: DISCONTINUED | OUTPATIENT
Start: 2024-04-08 | End: 2024-04-08 | Stop reason: HOSPADM

## 2024-04-08 RX ADMIN — HYDROXYZINE HYDROCHLORIDE 25 MG: 25 TABLET, FILM COATED ORAL at 08:41

## 2024-04-08 RX ADMIN — OXYCODONE HYDROCHLORIDE 10 MG: 10 TABLET ORAL at 08:44

## 2024-04-08 RX ADMIN — OMEPRAZOLE 20 MG: 20 CAPSULE, DELAYED RELEASE ORAL at 05:36

## 2024-04-08 RX ADMIN — ACETAMINOPHEN 650 MG: 325 TABLET ORAL at 05:36

## 2024-04-08 RX ADMIN — LISINOPRIL 40 MG: 20 TABLET ORAL at 05:36

## 2024-04-08 RX ADMIN — VANCOMYCIN HYDROCHLORIDE 1250 MG: 5 INJECTION, POWDER, LYOPHILIZED, FOR SOLUTION INTRAVENOUS at 08:48

## 2024-04-08 NOTE — DISCHARGE INSTRUCTIONS
Discharge Instructions per PRABHAKAR Zamorano.     -Start taking Augmentin this evening and continue for full course.  -Follow-up with your PCP status post hospitalization.     DIET: As tolerated     ACTIVITY: As tolerated     DIAGNOSIS: Wounds     Return to ER if you start to experience any numbness and tingling, chest pain, potation's, shortness of breath.

## 2024-04-08 NOTE — ED TRIAGE NOTES
"Patient to ED with complaints of multiple open sores to legs and arm. She states she had a hang nail to left index finger and developed swelling and \"Popped\" it will a safety pin. She states the next day she develop a sore on the left arm, left upper leg and right upper leg. No fever. No drainage.   "

## 2024-04-08 NOTE — H&P
Hospital Medicine History & Physical Note    Date of Service  2024    Primary Care Physician  Pcp Pt States None      Code Status  Full Code    Chief Complaint  Chief Complaint   Patient presents with    Skin Lesion    Wound Check       History of Presenting Illness  Madhavi Coffman is a 59 y.o. female with history of GERD, lower back pain, hypertension, peripheral neuropathy, anxiety and depression, who presented 2024 with complaints of wounds with seropurulent discharge of the upper and lower extremities.  Onset was 6 days ago when she noticed swelling and redness of the distal phalanx of the left index.  She popped up it with a pin, after which symptoms improved, but she developed new bump  with fluctuance on the left forearm, she picked up with her nail.  She then developed similar elements on the left forearm, left thigh and right thigh.  She states that the lesions are itchy and painful.  There was erythema developed around the elements on the left thigh.  She found p.o. amoxicillin,  and was taking it for 2 days without improvement.  Denies IV drug abuse.  In ER patient was given IV vancomycin and morphine.  I discussed the plan of care with patient and ERP .    Review of Systems  Review of Systems   Constitutional:  Negative for chills, fever and weight loss.   HENT:  Negative for ear pain, hearing loss and tinnitus.    Eyes:  Negative for blurred vision, double vision and photophobia.   Respiratory:  Negative for cough, hemoptysis and sputum production.    Cardiovascular:  Negative for chest pain, palpitations and orthopnea.   Gastrointestinal:  Negative for heartburn, nausea and vomiting.   Genitourinary:  Negative for dysuria, flank pain, frequency and hematuria.   Musculoskeletal:  Negative for back pain, joint pain and neck pain.   Skin:  Positive for rash. Negative for itching.   Neurological:  Negative for tremors, speech change, focal weakness and headaches.   Endo/Heme/Allergies:   Negative for environmental allergies and polydipsia. Does not bruise/bleed easily.   Psychiatric/Behavioral:  Negative for hallucinations and substance abuse. The patient is nervous/anxious.        Past Medical History   has a past medical history of Anxiety, Arthritis, DDD (degenerative disc disease), lumbar (02/07/2024), Depression, Diverticulosis, GERD with esophagitis, Hiatus hernia syndrome, Hyperlipidemia LDL goal <100 (02/07/2024), LBP (low back pain) (09/07/2012), Migraine without aura, with intractable migraine, so stated, without mention of status migrainosus, Obesity, Peripheral neuropathy, Primary hypertension (02/07/2024), Thoracic or lumbosacral neuritis or radiculitis, unspecified, and Vitamin D deficiency (02/07/2024).    Surgical History   has a past surgical history that includes sigmoid colectomy (2004); gastric bypass laparoscopic; pr removal of ovary/tube(s) (2005); tonsillectomy (1986); cholecystectomy; other (1999); fusion, spine, lumbar, plif (11/4/2009); lumbar decompression (11/4/2009); gastric bypass laparoscopic (4/5/2010); hiatal hernia repair (4/5/2010); s i joint fusion (11/11/2010); pr breast reduction; fusion, spine, lumbar, plif (7/26/2011); lumbar decompression (7/26/2011); hardware removal ortho (7/26/2011); pr percut implnt neuroelect,epidural (6/22/2012); pr percut implnt neuroelect,epidural (6/22/2012); inj,sacroiliac,anes/steroid (7/27/2012); inj,sacroiliac,anes/steroid (7/27/2012); thoracic laminectomy (9/18/2012); spinal cord stimulator (9/18/2012); inj,sacroiliac,anes/steroid (11/9/2012); inj,sacroiliac,anes/steroid (12/28/2012); neuro dest facet l/s w/ig sngl (3/29/2013); neuro dest facet l/s w/ig addl (3/29/2013); neuro dest facet l/s w/ig addl (3/29/2013); neuro dest facet l/s w/ig addl (3/29/2013); neuro dest facet l/s w/ig sngl (4/19/2013); neuro dest facet l/s w/ig addl (4/19/2013); neuro dest facet l/s w/ig addl (4/19/2013); inj,sacroiliac,anes/steroid (8/16/2013);  inj,sacroiliac,anes/steroid (8/16/2013); and inj,sacroiliac,anes/steroid (10/18/2013).     Family History  family history includes Alcohol/Drug in her paternal grandfather; Cancer in her father and maternal aunt; Dementia in her maternal grandmother; Diabetes in her father; Heart Disease in her maternal grandfather and maternal grandmother; Hypertension in her father; Other in her father.   Family history reviewed with patient. There is no family history that is pertinent to the chief complaint.     Social History   reports that she quit smoking about 3 months ago. Her smoking use included cigarettes. She has never used smokeless tobacco. She reports that she does not currently use alcohol after a past usage of about 0.5 oz of alcohol per week. She reports that she does not use drugs.    Allergies  Allergies   Allergen Reactions    Cleocin [Clindamycin Hcl]      Stomach upset    Miconazole     Neosporin [Neomycin-Bacitracin-Polymyxin] Vomiting and Swelling    Sudafed [Pseudoephedrine Hcl] Rash    Tape Contact Dermatitis       Medications  Prior to Admission Medications   Prescriptions Last Dose Informant Patient Reported? Taking?   atorvastatin (LIPITOR) 10 MG Tab   No No   Sig: Take 1 Tablet by mouth every evening.   doxepin (SINEQUAN) 10 MG Cap   No No   Sig: Take 1 Capsule by mouth every evening.   hydroCHLOROthiazide 25 MG Tab   No No   Sig: Take 1 Tablet by mouth every day.   lisinopril (PRINIVIL) 20 MG Tab   No No   Sig: Take 1 Tablet by mouth every day.   Patient taking differently: Take 40 mg by mouth every day.   omeprazole (PRILOSEC) 20 MG delayed-release capsule   No No   Sig: Take 1 Capsule by mouth every day.      Facility-Administered Medications: None       Physical Exam  Temp:  [36.7 °C (98.1 °F)] 36.7 °C (98.1 °F)  Pulse:  [] 78  Resp:  [16-22] 22  BP: (126)/(80-83) 126/80  SpO2:  [95 %-96 %] 95 %  Blood Pressure: 126/80   Temperature: 36.7 °C (98.1 °F)   Pulse: 78   Respiration: (!) 22  "  Pulse Oximetry: 95 %       Physical Exam  Vitals and nursing note reviewed.   Constitutional:       General: She is not in acute distress.     Appearance: Normal appearance.   HENT:      Head: Normocephalic and atraumatic.      Nose: Nose normal.      Mouth/Throat:      Mouth: Mucous membranes are moist.   Eyes:      Extraocular Movements: Extraocular movements intact.      Pupils: Pupils are equal, round, and reactive to light.   Cardiovascular:      Rate and Rhythm: Normal rate and regular rhythm.   Pulmonary:      Effort: Pulmonary effort is normal.      Breath sounds: Normal breath sounds.   Abdominal:      General: Abdomen is flat. There is no distension.      Tenderness: There is no abdominal tenderness.   Musculoskeletal:         General: No swelling or deformity. Normal range of motion.      Cervical back: Normal range of motion and neck supple.   Skin:     General: Skin is warm and dry.      Comments: Open skin wounds draining some seropurulent discharge left forearm, right forearm, left thigh, right thigh.  Erythema surrounding left thigh lesion.  Paronychia of the left index   Neurological:      General: No focal deficit present.      Mental Status: She is alert and oriented to person, place, and time.   Psychiatric:         Mood and Affect: Mood normal.         Behavior: Behavior normal.         Laboratory:  Recent Labs     04/07/24 1938   WBC 4.7*   RBC 4.02*   HEMOGLOBIN 11.5*   HEMATOCRIT 36.4*   MCV 90.5   MCH 28.6   MCHC 31.6*   RDW 59.7*   PLATELETCT 345   MPV 9.6     Recent Labs     04/07/24 1938   SODIUM 142   POTASSIUM 4.0   CHLORIDE 105   CO2 25   GLUCOSE 99   BUN 11   CREATININE 0.72   CALCIUM 8.6     Recent Labs     04/07/24 1938   ALTSGPT 35   ASTSGOT 80*   ALKPHOSPHAT 130*   TBILIRUBIN 0.2   GLUCOSE 99         No results for input(s): \"NTPROBNP\" in the last 72 hours.      No results for input(s): \"TROPONINT\" in the last 72 hours.    Imaging:  DX-CHEST-PORTABLE (1 VIEW)   Final Result "         1. No acute cardiopulmonary abnormalities are identified.          X-Ray:  I have personally reviewed the images and compared with prior images.    Assessment/Plan:  Justification for Admission Status  I anticipate this patient is appropriate for observation status at this time because skin infection    Patient will need a Med/Surg bed on MEDICAL service .  The need is secondary to skin infection.    * Skin infection- (present on admission)  Assessment & Plan  Patient presented with skin infection started 6 days ago, probably self propagated by picking up  Failure of outpatient treatment.  Will plan to treat with vancomycin and ceftriaxone  Wound culture, blood culture  Hydroxyzine and Xanax at night for anxiety and compulsion to pick the wounds up.  Continue doxepin  Local wound care    Primary hypertension- (present on admission)  Assessment & Plan  Continue lisinopril, hold hydrochlorothiazide  Monitor blood pressure    GERD with esophagitis- (present on admission)  Assessment & Plan  Continue omeprazole    Anxiety- (present on admission)  Assessment & Plan  Doxepin, hydroxyzine, Xanax at night.    Low back pain- (present on admission)  Assessment & Plan  Tylenol, oxycodone as needed        VTE prophylaxis: enoxaparin ppx

## 2024-04-08 NOTE — CARE PLAN
The patient is Stable - Low risk of patient condition declining or worsening    Shift Goals  Clinical Goals: IV abx, rest, pain management  Patient Goals: rest, pain management, abx      Problem: Knowledge Deficit - Standard  Goal: Patient and family/care givers will demonstrate understanding of plan of care, disease process/condition, diagnostic tests and medications  Outcome: Progressing     Problem: Pain - Standard  Goal: Alleviation of pain or a reduction in pain to the patient’s comfort goal  Outcome: Progressing       POC reviewed, IV abx and pain management overnight

## 2024-04-08 NOTE — ED NOTES
Med rec complete per patient  Allergies reviewed.   Outpatient antibiotics in the last 30 days? Yes   Anticoagulants taken in the last 14 days? No    *Patient has been taking an  RX of Augmentin 875 BID for 4 days    Pharmacy patient utilizes: [facility]    Arelis Casanova CPhT

## 2024-04-08 NOTE — ED PROVIDER NOTES
ER Provider Note    Scribed for Shorty Urias M.d. by Jadyn Gurrola. 4/7/2024  6:47 PM    Primary Care Provider: Pcp Pt States None    CHIEF COMPLAINT   Chief Complaint   Patient presents with    Skin Lesion    Wound Check     HPI/ROS  LIMITATION TO HISTORY   Select: : None  OUTSIDE HISTORIAN(S):  None    Madhavi Coffman is a 59 y.o. female who presents to the ED complaining of skin wounds to her extremities onset six days ago. She reports that there are many sores to her arms and legs. She also reports that last night she noticed a hang snail, pulled it, and it started to swell. She notes she popped it of a safety pin. She notes that following this, she started to develop a sore on her left arm, left upper leg, and right upper leg. She notes she had some older antibiotics, possible Augmentin, which she took four times. She notes having a fever one week ago. She notes that she after taking the bandages off she would notice purulent drainage. She denies drinking, drugs, or smoking. She denies cardiac history. There are no known alleviating or exacerbating factors.      PAST MEDICAL HISTORY  Past Medical History:   Diagnosis Date    Anxiety     Arthritis     back, osteoarthritis    DDD (degenerative disc disease), lumbar 02/07/2024    Depression     Diverticulosis     GERD with esophagitis     Hiatus hernia syndrome     had surgical repair    Hyperlipidemia LDL goal <100 02/07/2024    LBP (low back pain) 09/07/2012    8/10    Migraine without aura, with intractable migraine, so stated, without mention of status migrainosus     Obesity     Peripheral neuropathy     Primary hypertension 02/07/2024    Bp poorly controlled today.  Needs refills for lisinopril  and HCTZ    Thoracic or lumbosacral neuritis or radiculitis, unspecified     bilateral L4-S1 radiculopathies    Vitamin D deficiency 02/07/2024       SURGICAL HISTORY  Past Surgical History:   Procedure Laterality Date    INJ,SACROILIAC,ANES/STEROID  10/18/2013     Performed by Jose Eduardo Archuleta D.O. at [facility] ORS    INJ,SACROILIAC,ANES/STEROID  8/16/2013    Performed by Jose Eduardo Archuleta D.O. at [facility] ORS    INJ,SACROILIAC,ANES/STEROID  8/16/2013    Performed by Jose Eduardo Archuleta D.O. at [facility] ORS    NEURO DEST FACET L/S W/IG SNGL  4/19/2013    Performed by Jose Eduardo Archuleta D.O. at [facility] ORS    NEURO DEST FACET L/S W/IG ADDL  4/19/2013    Performed by Jose Eduardo Archuleta D.O. at [facility] ORS    NEURO DEST FACET L/S W/IG ADDL  4/19/2013    Performed by Jose Eduardo [facility]uleta D.O. at [facility]    NEURO DEST FACET L/S W/IG SNGL  3/29/2013    Performed by Jose Eduardo Archuleta D.O. at [facility]    NEURO DEST FACET L/S W/IG ADDL  3/29/2013    Performed by Jose Eduardo Archuleta D.O. at [facility] ORS    NEURO DEST FACET L/S W/IG ADDL  3/29/2013    Performed by Jose Eduardo Archuleta D.O. at [facility]    NEURO DEST FACET L/S W/IG ADDL  3/29/2013    Performed by Jose Eduardo Archuleta D.O. at [facility]    INJ,SACROILIAC,ANES/STEROID  12/28/2012    Performed by Jose Eduardo Archuleta D.O. at [facility] ORS    INJ,SACROILIAC,ANES/STEROID  11/9/2012    Performed by Jose Eduardo Archuleta D.O. at [facility] ORS    THORACIC LAMINECTOMY  9/18/2012    Performed by King Leal M.D. at [facility] ORS    SPINAL CORD STIMULATOR  9/18/2012    Performed by King Leal M.D. at [facility] ORS    INJ,SACROILIAC,ANES/STEROID  7/27/2012    Performed by JOSE EDUARDO ARCHULETA at [facility] ORS    INJ,SACROILIAC,ANES/STEROID  7/27/2012    Performed by JOSE EDUARDO ARCHULETA at [facility]    MN PERCUT IMPLNT NEUROELECT,EPIDURAL  6/22/2012    Performed by JOSE EDUARDO ARCHULETA at [facility]    MN PERCUT IMPLNT NEUROELECT,EPIDURAL  6/22/2012    Performed by JOSE EDUARDO ARCHULETA at [facility]  ORS    FUSION, SPINE, LUMBAR, PLIF  7/26/2011    Performed by DANIAL KABA at [facility] ORS    LUMBAR DECOMPRESSION  7/26/2011    Performed by DANIAL KABA at [facility] ORS    HARDWARE REMOVAL ORTHO  7/26/2011    Performed by DANIAL KABA at [facility] ORS    S I JOINT FUSION  11/11/2010    Performed by DANIAL KABA at [facility] ORS    GASTRIC BYPASS LAPAROSCOPIC  4/5/2010    Performed by GANSER, JOHN H at [facility]    HIATAL HERNIA REPAIR  4/5/2010    Performed by GANSER, JOHN H at [facility] ORS    FUSION, SPINE, LUMBAR, PLIF  11/4/2009    Performed by DANIAL KABA at [facility] ORS    LUMBAR DECOMPRESSION  11/4/2009    Performed by DANIAL KABA at [facility] ORS    OK REMOVAL OF OVARY/TUBE(S)  2005    left    SIGMOID COLECTOMY  2004    OTHER  1999    lasik surgery    TONSILLECTOMY  1986    CHOLECYSTECTOMY      GASTRIC BYPASS LAPAROSCOPIC      x2    OK BREAST REDUCTION         FAMILY HISTORY  Family History   Problem Relation Age of Onset    Cancer Father         ? type of cancer    Diabetes Father     Hypertension Father     Other Father         Migraine    Cancer Maternal Aunt         breast    Heart Disease Maternal Grandmother     Dementia Maternal Grandmother     Heart Disease Maternal Grandfather     Alcohol/Drug Paternal Grandfather        SOCIAL HISTORY   reports that she quit smoking about 3 months ago. Her smoking use included cigarettes. She has never used smokeless tobacco. She reports that she does not currently use alcohol after a past usage of about 0.5 oz of alcohol per week. She reports that she does not use drugs.    CURRENT MEDICATIONS  Previous Medications    ATORVASTATIN (LIPITOR) 10 MG TAB    Take 1 Tablet by mouth every evening.    DOXEPIN (SINEQUAN) 10 MG CAP    Take 1 Capsule by mouth every evening.    HYDROCHLOROTHIAZIDE 25 MG TAB    Take 1 Tablet by mouth every day.    LISINOPRIL  "(PRINIVIL) 20 MG TAB    Take 1 Tablet by mouth every day.    OMEPRAZOLE (PRILOSEC) 20 MG DELAYED-RELEASE CAPSULE    Take 1 Capsule by mouth every day.       ALLERGIES  Cleocin [clindamycin hcl], Miconazole, Neosporin [neomycin-bacitracin-polymyxin], Sudafed [pseudoephedrine hcl], and Tape    PHYSICAL EXAM  /83   Pulse (!) 115   Temp 36.7 °C (98.1 °F) (Oral)   Resp 16   Ht 1.664 m (5' 5.5\")   Wt 88 kg (194 lb 0.1 oz)   LMP 08/03/1999   SpO2 96%   BMI 31.79 kg/m²   Constitutional: Alert in no apparent distress.  HENT: No signs of trauma, Bilateral external ears normal, Nose normal. Uvula midline.   Eyes: Pupils are equal and reactive, Conjunctiva normal, Non-icteric.   Neck: Normal range of motion, No tenderness, Supple, No stridor.   Lymphatic: No lymphadenopathy noted.   Cardiovascular: Regular rate and rhythm, no murmurs.   Thorax & Lungs: Normal breath sounds, No respiratory distress, No wheezing, No chest tenderness.   Abdomen: Bowel sounds normal, Soft, No tenderness, No peritoneal signs, No masses, No pulsatile masses.   Skin: Warm, Dry, Numerous areas of erythema to bilateral upper thighs no area of fluctuance at the movement, greatest of which is 2 x 2 cm, another lesion present on the left wrist that is 1.5 cm x 1.5 cm and an area at the left index finger with no area of fluctuance.      Back: No bony tenderness, No CVA tenderness.   Extremities: Intact distal pulses, No edema, No tenderness, No cyanosis.  Musculoskeletal: Good range of motion in all major joints. No tenderness to palpation or major deformities noted.   Neurologic: Alert , Normal motor function, Normal sensory function, No focal deficits noted.   Psychiatric: Affect normal, Judgment normal, Mood normal.      DIAGNOSTIC STUDIES    LABS    Labs Reviewed   CBC WITH DIFFERENTIAL - Abnormal; Notable for the following components:       Result Value    WBC 4.7 (*)     RBC 4.02 (*)     Hemoglobin 11.5 (*)     Hematocrit 36.4 (*)     " MCHC 31.6 (*)     RDW 59.7 (*)     Neutrophils-Polys 31.30 (*)     Lymphocytes 56.80 (*)     Neutrophils (Absolute) 1.46 (*)     All other components within normal limits   COMP METABOLIC PANEL - Abnormal; Notable for the following components:    AST(SGOT) 80 (*)     Alkaline Phosphatase 130 (*)     All other components within normal limits   LACTIC ACID   URINALYSIS   T.PALLIDUM AB ALICIA (SCREENING)   ESTIMATED GFR   LACTIC ACID   LACTIC ACID   URINE CULTURE(NEW)   BLOOD CULTURE   BLOOD CULTURE   MRSA BY PCR (AMP)         COURSE & MEDICAL DECISION MAKING     ASSESSMENT, COURSE AND PLAN  Care Narrative:     6:52 PM - Patient was evaluated at bedside; Patient is a 59 y.o. female who presents for evaluation of skin wounds. Exam reveals Numerous areas of erythema to bilateral upper thighs no area of fluctuance at the movement, greatest of which is 2 x 2 cm, another lesion present on the left wrist that is 1.5 cm x 1.5 cm and an area at the left index finger with no area of fluctuance. Discussed with patient and/or family that the patient will need to be treated with antibiotics and would admit the patient for further care. DX-Chest, Lactic acid, CBC w/ diff, CMP, UA, Urine culture, blood culture, MRSA ordered. The patient was medicated with LR Bolus and Vancomycin for their symptoms.  Patient and/or family agrees to the plan of care.    Patient with evidence of numerous abscesses in bilateral lower extremities and unilateral upper extremity  Patient has failed outpatient antibiotics at home for the last several days  Given this plan for admission and IV antibiotics  I discussed obtaining an echocardiogram with admitting physician  Patient given 30 cc/kg bolus  Patient given morphine for pain control  There is no abscess that I can drain at this time    CRITICAL CARE  The very real possibilty of a deterioration of this patient's condition required the highest level of my preparedness for sudden, emergent intervention.  I  provided critical care services, which included medication orders, frequent reevaluations of the patient's condition and response to treatment, ordering and reviewing test results, and discussing the case with various consultants.  The critical care time associated with the care of the patient was 46 minutes. Review chart for interventions. This time is exclusive of any other billable procedures.         -  I discussed the patient's case and the above findings with Dr. Garcia (Internal Medicine) who agrees to be admitted.             DISPOSITION AND DISCUSSIONS  I have discussed management of the patient with the following physicians and ANT's:  Dr. Garcia (Internal Medicine)    Discussion of management with other [facility] or appropriate source(s): Pharmacy        Barriers to care at this time, including but not limited to: Patient does not have established PCP.     Decision tools and prescription drugs considered including, but not limited to: Antibiotics Vancomycin .    DISPOSITION:  Patient will be hospitalized by Dr. Garcia in guarded condition.    FINAL DIANGOSIS  1. Abscess    2. Cellulitis of left lower extremity    3. Cellulitis of left upper extremity       Jadyn GLASS (Danette), am scribing for, and in the presence of, Shorty Urias M.D..    Electronically signed by: Jadyn Gurrola (Citlalli[place]ayan), 4/7/2024    IShorty M.D. personally performed the services described in this documentation, as scribed by Jadyn Gurrola in my presence, and it is both accurate and complete.      The note accurately reflects work and decisions made by me.  Shorty Urias M.D.  4/7/2024  8:18 PM

## 2024-04-08 NOTE — PROGRESS NOTES
Pharmacy Vancomycin Kinetics Note for 4/7/2024     59 y.o. female on Vancomycin day # 1     Vancomycin Indication (AUC Dosing): Skin/skin structure infection    Provider specified end date: 04/13/24    Active Antibiotics (From admission, onward)      Ordered     Ordering Provider       Sun Apr 7, 2024  9:09 PM    04/07/24 2109  cefTRIAXone (Rocephin) syringe 2,000 mg  EVERY 24 HOURS         Teodoro Cisneros M.D.    04/07/24 2109  MD Alert...Vancomycin per Pharmacy  PHARMACY TO DOSE        Question:  Indication(s) for vancomycin?  Answer:  Skin and soft tissue infection    MARGARET Herron Apr 7, 2024  7:37 PM    04/07/24 1937  vancomycin (Vancocin) 2,250 mg in  mL IVPB  (vancomycin (VANCOCIN) IV (LD + Maintenance))  ONCE         Shorty Urias M.D.            Dosing Weight: 88.6 kg (195 lb 5.2 oz)      Admission History: Admitted on 4/7/2024 for Skin abscess [L02.91]  Pertinent history: 60 yo with PMH of peripheral neuropathy, obesity, HTN, HLD, diverticulosis presents to the ED with cc skin lesion and wound check on her extremities (onset 6 days ago). She noted some swelling, and purulence after taking bandages off. Ceftriaxone and Vancomycin initiated. MRSA nares negative. Blood culture & wound culture ordered.    Allergies:     Neosporin [neomycin-bacitracin-polymyxin], Sudafed [pseudoephedrine hcl], Cleocin [clindamycin hcl], and Tape     Pertinent cultures to date:     Results       Procedure Component Value Units Date/Time    CULTURE WOUND W/ GRAM STAIN [[id number]] Collected: 04/07/24 2118    Order Status: Sent Specimen: Wound from Left Leg Updated: 04/07/24 2201    MRSA By PCR (Amp) [[id number]] Collected: 04/07/24 1938    Order Status: Completed Specimen: Respirate from Nares Updated: 04/07/24 2127     MRSA by PCR Negative    Blood Culture - Draw one from central line and one from peripheral site [[id number]] Collected: 04/07/24 2029    Order Status: Sent Specimen: Blood from  "Peripheral Updated: 24    Blood Culture - Draw one from central line and one from peripheral site [[id number]] Collected: 24    Order Status: Sent Specimen: Blood from Line Updated: 24    Urinalysis [[id number]] Collected: 24    Order Status: Completed Specimen: Urine Updated: 24     Color Yellow     Character Clear     Specific Gravity 1.008     Ph 6.0     Glucose Negative mg/dL      Ketones Negative mg/dL      Protein Negative mg/dL      Bilirubin Negative     Urobilinogen, Urine 0.2     Nitrite Negative     Leukocyte Esterase Negative     Occult Blood Negative     Micro Urine Req see below     Comment: Microscopic examination not performed when specimen is clear  and chemically negative for protein, blood, leukocyte esterase  and nitrite.         Urine Culture (New) [[id number]] Collected: 24    Order Status: Sent Specimen: Urine Updated: 24            Labs:     Estimated Creatinine Clearance: 92.4 mL/min (by C-G formula based on SCr of 0.72 mg/dL).  Recent Labs     24   WBC 4.7*   NEUTSPOLYS 31.30*     Recent Labs     24   BUN 11   CREATININE 0.72   ALBUMIN 4.0     No intake or output data in the 24 hours ending 24 2226   BP (!) 142/87   Pulse 82   Temp 36.7 °C (98 °F) (Temporal)   Resp 16   Ht 1.651 m (5' 5\")   Wt 88.6 kg (195 lb 5.2 oz)   SpO2 94%  Temp (24hrs), Av.7 °C (98.1 °F), Min:36.7 °C (98 °F), Max:36.7 °C (98.1 °F)      List concerns for Vancomycin clearance:     Obesity    Pharmacokinetics:     AUC kinetics:   Ke (hr ^-1): 0.0811 hr^-1  Half life: 8.55 hr  Clearance: 4.763  Estimated TDD: 2381.5  Estimated Dose: 1052  Estimated interval: 10.6    A/P:     -  Vancomycin dose: 1250 mg IV every 12 hrs    -  Next vancomycin level(s): None ordered at this time. Likely obtain levels after the 4th maintenance dose unless clinical status or renal function changes.      -  Predicted vancomycin AUC " from initial AUC test calculator: 525 mg·hr/L    -  Comments: Pharmacy will continue to monitor patient clinical status for therapy adjustment/de-escalation     Yenni Garcia, PharmD

## 2024-04-08 NOTE — ASSESSMENT & PLAN NOTE
Patient presented with skin infection started 6 days ago, probably self propagated by picking up  Failure of outpatient treatment.  Will plan to treat with vancomycin and ceftriaxone  Wound culture, blood culture  Hydroxyzine and Xanax at night for anxiety and compulsion to pick the wounds up.  Continue doxepin  Local wound care   No

## 2024-04-08 NOTE — PROGRESS NOTES
4 Eyes Skin Assessment Completed by KJ Tdaeo and KJ Roberts.    Head WDL  Ears WDL  Nose WDL  Mouth WDL  Neck WDL  Breast/Chest WDL  Shoulder Blades WDL  Spine WDL  (R) Arm/Elbow/Hand WDL  (L) Arm/Elbow/Hand Redness, Abrasion, and Scab  Abdomen WDL  Groin WDL  Scrotum/Coccyx/Buttocks WDL  (R) Leg Redness, Scab, and Abrasion  (L) Leg Redness, Scab, and Abrasion  (R) Heel/Foot/Toe WDL  (L) Heel/Foot/Toe WDL          Devices In Places Blood Pressure Cuff and Pulse Ox      Interventions In Place N/A    Possible Skin Injury No    Pictures Uploaded Into Epic Yes  Wound Consult Placed Yes  RN Wound Prevention Protocol Ordered No

## 2024-04-08 NOTE — PROGRESS NOTES
Discharge orders received.  Patient arrived to the discharge lounge.  PIV removed.  Instructions given, medications reviewed and general discharge education provided to patient.  Follow up appointments discussed.  Patient verbalized understanding of dc instructions and prescriptions.  Patient signed discharge instructions.  Patient verbalized she had all belongings with her.  Patient waiting for meds to beds medications.  Patient will use [facility] for transport home.  Wished patient a speedy recovery.

## 2024-04-08 NOTE — HOSPITAL COURSE
Madhavi Coffman is a 59 y.o. female with history of GERD, lower back pain, hypertension, peripheral neuropathy, anxiety and depression, who presented 2024 with complaints of wounds with seropurulent discharge of the upper and lower extremities.      Onset was 6 days ago when she noticed swelling and redness of the distal phalanx of the left index.  She popped up it with a pin, after which symptoms improved, but she developed new bump  with fluctuance on the left forearm, she picked up with her nail.  She then developed similar elements on the left forearm, left thigh and right thigh.  She states that the lesions are itchy and painful.  There was erythema developed around the elements on the left thigh.  She found p.o. amoxicillin,  and was taking it for 2 days without improvement.  Denies IV drug abuse.    In the emergency department vital signs significant for heart rate 115.  Labs significant for WBC 4.7, AST 80, alkaline phosphatase 130.  Chest x-ray showing no acute cardiopulmonary abnormalities. Patient was fluid resuscitated, blood culture and wound cultures collected with no growth to date.  MRSA swab negative.    Patient seen and examined this a.m.  She states that she is feeling well.  She is having some itchiness at the site of the wounds, but at this time the wounds are covered with Band-Aids with little to no drainage. Educated patient that there is no signs from her labs that there is any systemic infection.  Her vital signs are stable, and there is no leukocytosis.  She has had no new lesions start since she has received antibiotics last night.    Educated her that we will be discharging her with antibiotics to be taken for 5 days BID.  She already has a follow-up with her PCP. I discussed with her to discuss her low white blood cell count at that time as there is no further workup to be done currently.  Patient expresses understanding and all questions answered at this time.  Patient  okay to discharge.

## 2024-04-08 NOTE — DISCHARGE SUMMARY
Discharge Summary    CHIEF COMPLAINT ON ADMISSION  Chief Complaint   Patient presents with    Skin Lesion    Wound Check       Reason for Admission  wound check     Admission Date  2024    CODE STATUS  Full Code    HPI & HOSPITAL COURSE  Madhavi Coffman is a 59 y.o. female with history of GERD, lower back pain, hypertension, peripheral neuropathy, anxiety and depression, who presented 2024 with complaints of wounds with seropurulent discharge of the upper and lower extremities.      Onset was 6 days ago when she noticed swelling and redness of the distal phalanx of the left index.  She popped up it with a pin, after which symptoms improved, but she developed new bump  with fluctuance on the left forearm, she picked up with her nail.  She then developed similar elements on the left forearm, left thigh and right thigh.  She states that the lesions are itchy and painful.  There was erythema developed around the elements on the left thigh.  She found p.o. amoxicillin,  and was taking it for 2 days without improvement.  Denies IV drug abuse.    In the emergency department vital signs significant for heart rate 115.  Labs significant for WBC 4.7, AST 80, alkaline phosphatase 130.  Chest x-ray showing no acute cardiopulmonary abnormalities. Patient was fluid resuscitated, blood culture and wound cultures collected with no growth to date.  MRSA swab negative.    Patient seen and examined this a.m.  She states that she is feeling well.  She is having some itchiness at the site of the wounds, but at this time the wounds are covered with Band-Aids with little to no drainage. Educated patient that there is no signs from her labs that there is any systemic infection.  Her vital signs are stable, and there is no leukocytosis.  She has had no new lesions start since she has received antibiotics last night.    Educated her that we will be discharging her with antibiotics to be taken for 5 days BID.  She already  has a follow-up with her PCP. I discussed with her to discuss her low white blood cell count at that time as there is no further workup to be done currently.  Patient expresses understanding and all questions answered at this time.  Patient okay to discharge.    Therefore, she is discharged in good and stable condition to home with close outpatient follow-up.    The patient recovered much more quickly than anticipated on admission.    Discharge Date  04/08/24    FOLLOW UP ITEMS POST DISCHARGE  Discharge Instructions per PRABHAKAR Zamorano.     -Start taking Augmentin this evening and continue for full course.  -Follow-up with your PCP status post hospitalization.     DIET: As tolerated     ACTIVITY: As tolerated     DIAGNOSIS: Wounds     Return to ER if you start to experience any numbness and tingling, chest pain, potation's, shortness of breath.    DISCHARGE DIAGNOSES  Principal Problem:    Skin infection (POA: Yes)  Active Problems:    Low back pain (POA: Yes)      Overview: Diagnois update 10/1/2016    Anxiety (POA: Yes)    GERD with esophagitis (POA: Yes)    Primary hypertension (POA: Yes)      Overview: Bp poorly controlled today.  Needs refills for lisinopril  and HCTZ  Resolved Problems:    * No resolved hospital problems. *      FOLLOW UP  Future Appointments   Date Time Provider Department Center   4/16/2024  9:00 AM RUDOLPH [facility]       MEDICATIONS ON DISCHARGE     Medication List        CONTINUE taking these medications        Instructions   amoxicillin-clavulanate 875-125 MG Tabs  Commonly known as: Augmentin   Take 1 Tablet by mouth 2 times a day for 5 days.  Dose: 1 Tablet     asa/apap/caffeine 250-250-65 MG Tabs  Commonly known as: Excedrin   Take 4 Tablets by mouth 2 times a day as needed for Headache.  Dose: 4 Tablet     atorvastatin 10 MG Tabs  Commonly known as: Lipitor   Take 1 Tablet by mouth every evening.  Dose: 10 mg     doxepin 10 MG Caps  Commonly known as:  SINEquan   Take 1 Capsule by mouth every evening.  Dose: 10 mg     hydroCHLOROthiazide 25 MG Tabs   Take 1 Tablet by mouth every day.  Dose: 25 mg     lisinopril 40 MG tablet  Commonly known as: Prinivil   Take 40 mg by mouth every day.  Dose: 40 mg     omeprazole 20 MG delayed-release capsule  Commonly known as: PriLOSEC   Take 1 Capsule by mouth every day.  Dose: 20 mg              Allergies  Allergies   Allergen Reactions    Neosporin [Neomycin-Bacitracin-Polymyxin] Vomiting and Swelling    Sudafed [Pseudoephedrine Hcl] Rash          Cleocin [Clindamycin Hcl]      Stomach upset    Tape Contact Dermatitis       DIET  Orders Placed This Encounter   Procedures    Diet Order Diet: Regular     Standing Status:   Standing     Number of Occurrences:   1     Order Specific Question:   Diet:     Answer:   Regular [1]       ACTIVITY  As tolerated.  Weight bearing as tolerated    CONSULTATIONS  None    PROCEDURES  None    IMAGING  DX-CHEST-PORTABLE (1 VIEW)   Final Result         1. No acute cardiopulmonary abnormalities are identified.          LABORATORY  Lab Results   Component Value Date    SODIUM 143 04/08/2024    POTASSIUM 4.0 04/08/2024    CHLORIDE 107 04/08/2024    CO2 27 04/08/2024    GLUCOSE 85 04/08/2024    BUN 11 04/08/2024    CREATININE 0.74 04/08/2024    CREATININE 0.93 04/11/2011    GLOMRATE >59 04/19/2010        Lab Results   Component Value Date    WBC 3.9 (L) 04/08/2024    WBC 6.4 04/11/2011    HEMOGLOBIN 9.5 (L) 04/08/2024    HEMATOCRIT 30.7 (L) 04/08/2024    PLATELETCT 276 04/08/2024          Eloisa GLASS A.P.R.N. performed a substantiated portion of the service face-to-face with same patient on the same date of service INDEPENDENTLY FROM THE MD ON ASSESSMENT, EXAMINATION, AND DISCUSSION IN PLAN OF CARE FOR 17 MINUTES.  I was personally involved in reviewing and conducting the medical decision making, including the information as described below:

## 2024-04-09 LAB
BACTERIA UR CULT: NORMAL
SIGNIFICANT IND 70042: NORMAL
SITE SITE: NORMAL
SOURCE SOURCE: NORMAL

## 2024-04-10 LAB
BACTERIA WND AEROBE CULT: NORMAL
GRAM STN SPEC: NORMAL
SIGNIFICANT IND 70042: NORMAL
SITE SITE: NORMAL
SOURCE SOURCE: NORMAL

## 2024-04-12 LAB
BACTERIA BLD CULT: NORMAL
BACTERIA BLD CULT: NORMAL
SIGNIFICANT IND 70042: NORMAL
SIGNIFICANT IND 70042: NORMAL
SITE SITE: NORMAL
SITE SITE: NORMAL
SOURCE SOURCE: NORMAL
SOURCE SOURCE: NORMAL

## 2024-04-16 ENCOUNTER — HOSPITAL ENCOUNTER (OUTPATIENT)
Facility: MEDICAL CENTER | Age: 59
End: 2024-04-16
Attending: NURSE PRACTITIONER
Payer: COMMERCIAL

## 2024-04-16 ENCOUNTER — OFFICE VISIT (OUTPATIENT)
Dept: MEDICAL GROUP | Facility: MEDICAL CENTER | Age: 59
End: 2024-04-16
Payer: COMMERCIAL

## 2024-04-16 VITALS
DIASTOLIC BLOOD PRESSURE: 80 MMHG | OXYGEN SATURATION: 93 % | BODY MASS INDEX: 31.89 KG/M2 | TEMPERATURE: 98.4 F | HEIGHT: 65 IN | HEART RATE: 93 BPM | WEIGHT: 191.4 LBS | SYSTOLIC BLOOD PRESSURE: 124 MMHG

## 2024-04-16 DIAGNOSIS — E55.9 VITAMIN D DEFICIENCY: ICD-10-CM

## 2024-04-16 DIAGNOSIS — F51.01 PRIMARY INSOMNIA: ICD-10-CM

## 2024-04-16 DIAGNOSIS — I10 PRIMARY HYPERTENSION: ICD-10-CM

## 2024-04-16 DIAGNOSIS — D64.9 ANEMIA, UNSPECIFIED TYPE: ICD-10-CM

## 2024-04-16 DIAGNOSIS — E83.51 HYPOCALCEMIA: ICD-10-CM

## 2024-04-16 DIAGNOSIS — K21.00 GASTROESOPHAGEAL REFLUX DISEASE WITH ESOPHAGITIS WITHOUT HEMORRHAGE: ICD-10-CM

## 2024-04-16 DIAGNOSIS — R79.89 ELEVATED LFTS: ICD-10-CM

## 2024-04-16 DIAGNOSIS — E78.5 HYPERLIPIDEMIA LDL GOAL <100: ICD-10-CM

## 2024-04-16 DIAGNOSIS — B37.31 YEAST INFECTION OF THE VAGINA: ICD-10-CM

## 2024-04-16 DIAGNOSIS — L03.90 CELLULITIS, UNSPECIFIED CELLULITIS SITE: ICD-10-CM

## 2024-04-16 DIAGNOSIS — M81.0 AGE-RELATED OSTEOPOROSIS WITHOUT CURRENT PATHOLOGICAL FRACTURE: ICD-10-CM

## 2024-04-16 PROCEDURE — 3079F DIAST BP 80-89 MM HG: CPT | Performed by: NURSE PRACTITIONER

## 2024-04-16 PROCEDURE — 87660 TRICHOMONAS VAGIN DIR PROBE: CPT

## 2024-04-16 PROCEDURE — 99214 OFFICE O/P EST MOD 30 MIN: CPT | Performed by: NURSE PRACTITIONER

## 2024-04-16 PROCEDURE — 87480 CANDIDA DNA DIR PROBE: CPT

## 2024-04-16 PROCEDURE — 87510 GARDNER VAG DNA DIR PROBE: CPT

## 2024-04-16 PROCEDURE — 3074F SYST BP LT 130 MM HG: CPT | Performed by: NURSE PRACTITIONER

## 2024-04-16 RX ORDER — DOXEPIN HYDROCHLORIDE 25 MG/1
25 CAPSULE ORAL NIGHTLY
Qty: 30 CAPSULE | Refills: 1 | Status: SHIPPED | OUTPATIENT
Start: 2024-04-16

## 2024-04-16 RX ORDER — OMEPRAZOLE 20 MG/1
20 CAPSULE, DELAYED RELEASE ORAL DAILY
Qty: 90 CAPSULE | Refills: 3 | Status: SHIPPED | OUTPATIENT
Start: 2024-04-16

## 2024-04-16 RX ORDER — ERGOCALCIFEROL 1.25 MG/1
50000 CAPSULE ORAL
Qty: 12 CAPSULE | Refills: 0 | Status: SHIPPED | OUTPATIENT
Start: 2024-04-16

## 2024-04-16 RX ORDER — HYDROCHLOROTHIAZIDE 25 MG/1
25 TABLET ORAL DAILY
Qty: 90 TABLET | Refills: 3 | Status: SHIPPED | OUTPATIENT
Start: 2024-04-16

## 2024-04-16 RX ORDER — ATORVASTATIN CALCIUM 10 MG/1
10 TABLET, FILM COATED ORAL NIGHTLY
Qty: 30 TABLET | Refills: 1 | Status: SHIPPED | OUTPATIENT
Start: 2024-04-16

## 2024-04-16 RX ORDER — LISINOPRIL 40 MG/1
40 TABLET ORAL DAILY
Qty: 90 TABLET | Refills: 3 | Status: SHIPPED | OUTPATIENT
Start: 2024-04-16

## 2024-04-16 ASSESSMENT — FIBROSIS 4 INDEX: FIB4 SCORE: 2.01

## 2024-04-16 NOTE — PROGRESS NOTES
Subjective:     Madhavi Coffman is a 59 y.o. female who presents with cellulitis.    HPI:     Seen in f/u for cellulitis.    Problem #1:  She was seen in ER over the w/e for multiple sits of cellulitis.  Sx did not show MRSA.  No bacteria seen.  Had sites on left index finger, left wrist, 2 sites on left thigh and forming on rt thigh.  The rt thigh did not develop since seh had IV antibx.  Then sent home with oral antibx that she finished yesterday.  Sites are all healing well.  She is concerned that she may be getting a yeast infection d/t the antibx.  She thinks that d/t urine smells of antibx.     Problem #2:HTN  She has been checking her bp at home.  It was high at home.  But since going into hospital it has been normal.  She ran out of bp meds for 3 days before going to ER.  She is now back on meds.  Needs refills for all meds.     Problem #3:insomnia  She was started on doxepin 10 mg.  That initially worked well but doesn't work as well now.  Would like to go up on dose.     Problem #4:osteoporosis  This is new dx.  Just had dexa scan with both rrt hip and lumbar spine with osteoporosis.  No recent fx.  Does have OA in joints.  She has not been on ca++ or vitamin d.  She has been walking for exercise.     Problem #5:  She has been referred to GI for her chronic GERD.  She is having sx daily.  She will have to throw up her food several hrs after eating.  She feels that the omeprazole is not working.  Needs med refill    Lab results reviewed with pt:  Dexa scan: osteoporosis in both lumbar spine and left hip with 6.2% decrease in the bone mineral density of the lumbar spine and a 22.6% decrease in the bone mineral density of the proximal right femur.    GFR low  normal at 63.  She is not drinking enough water.  TSH, LP is wnl  Vitamin d low at 12.  Not on otc supplement.  Has been chronic low but now down from 29 to 12.  She is stable on lipitor for her chol.  Not on healthy diet or exercising.  CMP shows  SGOT elevated at 58.  SGPT, T.BILI wnl.  Alk phos chronic elevated but up from 105 to 151.  She is having joint pain in multiple sites     CBC done in hospital shows new onset anemia at 9.5/30.7.  no sx of bleeding  CMP shows ca++ and total protein low in hospital    Patient Active Problem List    Diagnosis Date Noted    Age-related osteoporosis without current pathological fracture 04/16/2024    Skin infection 04/07/2024    GERD with esophagitis 02/07/2024    Vitamin D deficiency 02/07/2024    DDD (degenerative disc disease), lumbar 02/07/2024    Primary hypertension 02/07/2024    Hyperlipidemia LDL goal <100 02/07/2024    Intractable migraine without aura     Arthritis     Hiatus hernia syndrome     Peripheral neuropathy     Diverticulosis     Anxiety 09/01/2009    Depression 09/01/2009    Obesity 08/03/2009    Low back pain 05/22/2009       Current medicines (including changes today)  Current Outpatient Medications   Medication Sig Dispense Refill    omeprazole (PRILOSEC) 20 MG delayed-release capsule Take 1 Capsule by mouth every day. 90 Capsule 3    lisinopril (PRINIVIL) 40 MG tablet Take 1 Tablet by mouth every day. 90 Tablet 3    hydroCHLOROthiazide 25 MG Tab Take 1 Tablet by mouth every day. 90 Tablet 3    doxepin (SINEQUAN) 25 MG Cap Take 1 Capsule by mouth every evening. 30 Capsule 1    atorvastatin (LIPITOR) 10 MG Tab Take 1 Tablet by mouth every evening. 30 Tablet 1    vitamin D2, Ergocalciferol, (DRISDOL) 1.25 MG (97001 UT) Cap capsule Take 1 Capsule by mouth every 7 days. 12 Capsule 0    asa/apap/caffeine (EXCEDRIN) 250-250-65 MG Tab Take 4 Tablets by mouth 2 times a day as needed for Headache.       No current facility-administered medications for this visit.       Allergies   Allergen Reactions    Neosporin [Neomycin-Bacitracin-Polymyxin] Vomiting and Swelling    Sudafed [Pseudoephedrine Hcl] Rash          Cleocin [Clindamycin Hcl]      Stomach upset    Tape Contact Dermatitis       Lipid Panel:  Lab  Results   Component Value Date/Time    CHOLSTRLTOT 243 (H) 03/13/2024 0707    TRIGLYCERIDE 125 03/13/2024 0707     (H) 03/13/2024 0707    CHOLHDLRAT 2.3 05/17/2014 0750       Thyroid:  Lab Results   Component Value Date/Time    TSHULTRASEN 1.290 03/13/2024 0707         Complete Blood Count:  Lab Results   Component Value Date/Time    WBC 3.9 (L) 04/08/2024 0352    RBC 3.35 (L) 04/08/2024 0352    HEMOGLOBIN 9.5 (L) 04/08/2024 0352    HEMATOCRIT 30.7 (L) 04/08/2024 0352    MCV 91.6 04/08/2024 0352    MCH 28.4 04/08/2024 0352    MCHC 30.9 (L) 04/08/2024 0352    RDW 60.7 (H) 04/08/2024 0352    MPV 9.4 04/08/2024 0352    LYMPHOCYTES 59.90 (H) 04/08/2024 0352    LYMPHS 2.33 04/08/2024 0352    MONOCYTES 9.30 04/08/2024 0352    MONOS 0.36 04/08/2024 0352    EOSINOPHILS 3.60 04/08/2024 0352    EOS 0.14 04/08/2024 0352    BASOPHILS 0.80 04/08/2024 0352    BASO 0.03 04/08/2024 0352    NRBC 0.00 04/08/2024 0352       Complete Metabolic Panel:  Lab Results   Component Value Date/Time    SODIUM 143 04/08/2024 03:52 AM    POTASSIUM 4.0 04/08/2024 03:52 AM    CHLORIDE 107 04/08/2024 03:52 AM    CO2 27 04/08/2024 03:52 AM    ANION 9.0 04/08/2024 03:52 AM    GLUCOSE 85 04/08/2024 03:52 AM    BUN 11 04/08/2024 03:52 AM    CREATININE 0.74 04/08/2024 03:52 AM    CREATININE 0.93 04/11/2011 08:15 AM    ASTSGOT 48 (H) 04/08/2024 03:52 AM    ALTSGPT 26 04/08/2024 03:52 AM    TBILIRUBIN <0.2 04/08/2024 03:52 AM    ALBUMIN 3.2 04/08/2024 03:52 AM    TOTPROTEIN 5.0 (L) 04/08/2024 03:52 AM    GLOBULIN 1.8 (L) 04/08/2024 03:52 AM    AGRATIO 1.8 04/08/2024 03:52 AM         Vitamin D:    Lab Results   Component Value Date/Time    25HYDROXY 12 (L) 03/13/2024 0707       GFR:    Lab Results   Component Value Date/Time    GFRCKD 93 04/08/2024 0352       ROS  Constitutional: Negative. Negative for fever, chills, weight loss, malaise/fatigue and diaphoresis.   HENT: Negative. Negative for hearing loss, ear pain, nosebleeds, congestion, sore  "throat, neck pain, tinnitus and ear discharge.   Respiratory: Negative. Negative for cough, hemoptysis, sputum production, shortness of breath, wheezing and stridor.   Cardiovascular: Negative. Negative for chest pain, palpitations, orthopnea, claudication, leg swelling and PND.   Gastrointestinal: Denies nausea, vomiting, diarrhea, constipation, heartburn, melena or hematochezia.  Genitourinary: Denies dysuria, hematuria, urinary incontinence, frequency or urgency.        Objective:     /80 (BP Location: Left arm, Patient Position: Sitting, BP Cuff Size: Adult)   Pulse 93   Temp 36.9 °C (98.4 °F) (Temporal)   Ht 1.651 m (5' 5\")   Wt 86.8 kg (191 lb 6.4 oz)   SpO2 93%  Body mass index is 31.85 kg/m².    Physical Exam:  Vitals reviewed.  Constitutional: Oriented to person, place, and time. appears well-developed and well-nourished. No distress.   Cardiovascular: Normal rate, regular rhythm, normal heart sounds and intact distal pulses. Exam reveals no gallop and no friction rub. No murmur heard. No carotid bruits.   Pulmonary/Chest: Effort normal and breath sounds normal. No stridor. No respiratory distress. no wheezes or rales. exhibits no tenderness.   Musculoskeletal: Normal range of motion. exhibits no edema. jennifer pedal pulses 2+.  Lymphadenopathy: No cervical or supraclavicular adenopathy.   Neurological: Alert and oriented to person, place, and time. exhibits normal muscle tone.  Skin: Skin is warm and dry. No diaphoresis. Previous cellulitis sites on left index finger, left wrist, 2 sites on left thigh with resolving erythema.  No edema or dg.    Assessment and Plan:     The following treatment plan was discussed:    1. Age-related osteoporosis without current pathological fracture      new dx.      2. Cellulitis, unspecified cellulitis site      cellulitis at multiple sites all healing well      3. Anemia, unspecified type  CBC WITH DIFFERENTIAL    cbc done in hospital is low. no sx of bleeding. " [facility] lab 1 mo. f/u for review      4. Primary hypertension  lisinopril (PRINIVIL) 40 MG tablet    hydroCHLOROthiazide 25 MG Tab    Comp Metabolic Panel    refill lisinopril & HCTZ. monitor BP. do lab, f/u 1 mo for review lab, pap smear & BP ck      5. Hyperlipidemia LDL goal <100  atorvastatin (LIPITOR) 10 MG Tab    RF & restart lipitor. continue healthy diet. enc pt to start regular exercise. [facility] lab and f/u 1 mo for review.      6. Yeast infection of the vagina  VAGINAL PATHOGENS DNA PANEL    do affirm. f/u w/pt w/results      7. Hypocalcemia      CMP done in hosp shows new dec ca++. rechk lab 1 mo      8. Gastroesophageal reflux disease with esophagitis without hemorrhage  omeprazole (PRILOSEC) 20 MG delayed-release capsule    RF omeprazole  f/u w/GI as planned      9. Vitamin D deficiency  vitamin D2, Ergocalciferol, (DRISDOL) 1.25 MG (25122 UT) Cap capsule    ergocalciferol x12 wks then D3 2000 units daily      10. Elevated LFTs  Comp Metabolic Panel    SGOT and alk phos elevated.  will monitor. enc dc all tyl and etoh. [facility] lab, f/u 1 mo for pap semar and lab review      11. Primary insomnia  doxepin (SINEQUAN) 25 MG Cap    not sleep ing well on 10 mg doxepine. increase to 25 mg. f/u 1 mo for sx eval            Followup: Return in about 4 weeks (around 5/14/2024), or pap smear and lab review.

## 2024-04-17 LAB
CANDIDA DNA VAG QL PROBE+SIG AMP: POSITIVE
G VAGINALIS DNA VAG QL PROBE+SIG AMP: NEGATIVE
T VAGINALIS DNA VAG QL PROBE+SIG AMP: NEGATIVE

## 2024-04-21 RX ORDER — ALENDRONATE SODIUM 70 MG/1
70 TABLET ORAL
Qty: 4 TABLET | Refills: 1 | Status: SHIPPED | OUTPATIENT
Start: 2024-04-21

## 2024-04-21 RX ORDER — FLUCONAZOLE 150 MG/1
150 TABLET ORAL ONCE
Qty: 1 TABLET | Refills: 0 | Status: SHIPPED | OUTPATIENT
Start: 2024-04-21 | End: 2024-04-21

## 2024-05-16 ENCOUNTER — APPOINTMENT (OUTPATIENT)
Dept: MEDICAL GROUP | Facility: MEDICAL CENTER | Age: 59
End: 2024-05-16
Payer: COMMERCIAL

## 2024-05-17 ENCOUNTER — HOSPITAL ENCOUNTER (OUTPATIENT)
Dept: LAB | Facility: MEDICAL CENTER | Age: 59
End: 2024-05-17
Attending: NURSE PRACTITIONER
Payer: COMMERCIAL

## 2024-05-17 DIAGNOSIS — I10 PRIMARY HYPERTENSION: ICD-10-CM

## 2024-05-17 DIAGNOSIS — D64.9 ANEMIA, UNSPECIFIED TYPE: ICD-10-CM

## 2024-05-17 DIAGNOSIS — R79.89 ELEVATED LFTS: ICD-10-CM

## 2024-05-17 LAB
ALBUMIN SERPL BCP-MCNC: 4.2 G/DL (ref 3.2–4.9)
ALBUMIN/GLOB SERPL: 2 G/DL
ALP SERPL-CCNC: 154 U/L (ref 30–99)
ALT SERPL-CCNC: 60 U/L (ref 2–50)
ANION GAP SERPL CALC-SCNC: 17 MMOL/L (ref 7–16)
AST SERPL-CCNC: 144 U/L (ref 12–45)
BASOPHILS # BLD AUTO: 0.4 % (ref 0–1.8)
BASOPHILS # BLD: 0.03 K/UL (ref 0–0.12)
BILIRUB SERPL-MCNC: 2 MG/DL (ref 0.1–1.5)
BUN SERPL-MCNC: 13 MG/DL (ref 8–22)
CALCIUM ALBUM COR SERPL-MCNC: 9.4 MG/DL (ref 8.5–10.5)
CALCIUM SERPL-MCNC: 9.6 MG/DL (ref 8.5–10.5)
CHLORIDE SERPL-SCNC: 101 MMOL/L (ref 96–112)
CO2 SERPL-SCNC: 23 MMOL/L (ref 20–33)
CREAT SERPL-MCNC: 0.99 MG/DL (ref 0.5–1.4)
EOSINOPHIL # BLD AUTO: 0.13 K/UL (ref 0–0.51)
EOSINOPHIL NFR BLD: 1.9 % (ref 0–6.9)
ERYTHROCYTE [DISTWIDTH] IN BLOOD BY AUTOMATED COUNT: 63.5 FL (ref 35.9–50)
GFR SERPLBLD CREATININE-BSD FMLA CKD-EPI: 66 ML/MIN/1.73 M 2
GLOBULIN SER CALC-MCNC: 2.1 G/DL (ref 1.9–3.5)
GLUCOSE SERPL-MCNC: 96 MG/DL (ref 65–99)
HCT VFR BLD AUTO: 39.9 % (ref 37–47)
HGB BLD-MCNC: 12.7 G/DL (ref 12–16)
IMM GRANULOCYTES # BLD AUTO: 0 K/UL (ref 0–0.11)
IMM GRANULOCYTES NFR BLD AUTO: 0 % (ref 0–0.9)
LYMPHOCYTES # BLD AUTO: 2.81 K/UL (ref 1–4.8)
LYMPHOCYTES NFR BLD: 42.1 % (ref 22–41)
MCH RBC QN AUTO: 28.9 PG (ref 27–33)
MCHC RBC AUTO-ENTMCNC: 31.8 G/DL (ref 32.2–35.5)
MCV RBC AUTO: 90.9 FL (ref 81.4–97.8)
MONOCYTES # BLD AUTO: 0.27 K/UL (ref 0–0.85)
MONOCYTES NFR BLD AUTO: 4 % (ref 0–13.4)
NEUTROPHILS # BLD AUTO: 3.44 K/UL (ref 1.82–7.42)
NEUTROPHILS NFR BLD: 51.6 % (ref 44–72)
NRBC # BLD AUTO: 0 K/UL
NRBC BLD-RTO: 0 /100 WBC (ref 0–0.2)
PLATELET # BLD AUTO: 181 K/UL (ref 164–446)
PMV BLD AUTO: 11.2 FL (ref 9–12.9)
POTASSIUM SERPL-SCNC: 4.4 MMOL/L (ref 3.6–5.5)
PROT SERPL-MCNC: 6.3 G/DL (ref 6–8.2)
RBC # BLD AUTO: 4.39 M/UL (ref 4.2–5.4)
SODIUM SERPL-SCNC: 141 MMOL/L (ref 135–145)
WBC # BLD AUTO: 6.7 K/UL (ref 4.8–10.8)

## 2024-05-21 ENCOUNTER — OFFICE VISIT (OUTPATIENT)
Dept: MEDICAL GROUP | Facility: MEDICAL CENTER | Age: 59
End: 2024-05-21
Payer: COMMERCIAL

## 2024-05-21 VITALS
SYSTOLIC BLOOD PRESSURE: 122 MMHG | OXYGEN SATURATION: 95 % | HEIGHT: 65 IN | HEART RATE: 100 BPM | BODY MASS INDEX: 31.57 KG/M2 | TEMPERATURE: 97.5 F | WEIGHT: 189.5 LBS | DIASTOLIC BLOOD PRESSURE: 82 MMHG

## 2024-05-21 DIAGNOSIS — R79.89 ELEVATED LFTS: ICD-10-CM

## 2024-05-21 DIAGNOSIS — K21.00 GASTROESOPHAGEAL REFLUX DISEASE WITH ESOPHAGITIS WITHOUT HEMORRHAGE: ICD-10-CM

## 2024-05-21 DIAGNOSIS — F51.01 PRIMARY INSOMNIA: ICD-10-CM

## 2024-05-21 DIAGNOSIS — E78.5 HYPERLIPIDEMIA LDL GOAL <100: ICD-10-CM

## 2024-05-21 DIAGNOSIS — M81.0 AGE-RELATED OSTEOPOROSIS WITHOUT CURRENT PATHOLOGICAL FRACTURE: ICD-10-CM

## 2024-05-21 PROCEDURE — 3079F DIAST BP 80-89 MM HG: CPT | Performed by: NURSE PRACTITIONER

## 2024-05-21 PROCEDURE — 99214 OFFICE O/P EST MOD 30 MIN: CPT | Performed by: NURSE PRACTITIONER

## 2024-05-21 PROCEDURE — 3074F SYST BP LT 130 MM HG: CPT | Performed by: NURSE PRACTITIONER

## 2024-05-21 RX ORDER — ZOLEDRONIC ACID 5 MG/100ML
5 INJECTION, SOLUTION INTRAVENOUS ONCE
Qty: 100 ML | Refills: 0 | Status: SHIPPED
Start: 2024-05-21 | End: 2024-05-21

## 2024-05-21 RX ORDER — DOXEPIN HYDROCHLORIDE 25 MG/1
25 CAPSULE ORAL NIGHTLY
Qty: 90 CAPSULE | Refills: 3 | Status: SHIPPED | OUTPATIENT
Start: 2024-05-21

## 2024-05-21 ASSESSMENT — FIBROSIS 4 INDEX: FIB4 SCORE: 6.06

## 2024-05-21 NOTE — PROGRESS NOTES
Subjective:     History of Present Illness  The patient is a 59-year-old female seen in follow-up for multiple medical concerns.    The patient expresses dissatisfaction with the efficacy of Fosamax in managing her osteoporosis, citing worsening of her GERD sx.  She had an incident of nocturnal awakenings with an unpleasant taste in her mouth. No black starry stool.  No blood in stool.  She is on ca++ and vitamin d.  Not doing weight bearing exercises due to work currently.  She was doing and will restart after friday presentation.      She is on doxepine 25 mg.  Stable on med.  Taking med approp.  Needs refill      Results    Lab reviewed with pt:  GFR, CBC is wnl  CMP shows elevated lft's.  SGOT previously up at 48 is now 144; SGPT previously wnl at 26 is now 60;  alk phos up from 101 to 154;, t bili was <0.2 but now up to 2.0.  she denies ETOH excessive.  Has been using tyl.  She has been on lipitor for about 1 yr w/o s/e.       Current medicines (including changes today)  Current Outpatient Medications   Medication Sig Dispense Refill    zoledronic Acid (RECLAST) 5 MG/100ML Solution IVPB premix Infuse 100 mL into a venous catheter one time for 1 dose. Dx:  M81.0 100 mL 0    doxepin (SINEQUAN) 25 MG Cap Take 1 Capsule by mouth every evening. 90 Capsule 3    omeprazole (PRILOSEC) 20 MG delayed-release capsule Take 1 Capsule by mouth every day. 90 Capsule 3    lisinopril (PRINIVIL) 40 MG tablet Take 1 Tablet by mouth every day. 90 Tablet 3    hydroCHLOROthiazide 25 MG Tab Take 1 Tablet by mouth every day. 90 Tablet 3    atorvastatin (LIPITOR) 10 MG Tab Take 1 Tablet by mouth every evening. 30 Tablet 1    vitamin D2, Ergocalciferol, (DRISDOL) 1.25 MG (61121 UT) Cap capsule Take 1 Capsule by mouth every 7 days. 12 Capsule 0    asa/apap/caffeine (EXCEDRIN) 250-250-65 MG Tab Take 4 Tablets by mouth 2 times a day as needed for Headache.       No current facility-administered medications for this visit.     She  has a  past medical history of Anxiety, Arthritis, DDD (degenerative disc disease), lumbar (02/07/2024), Depression, Diverticulosis, GERD with esophagitis, Hiatus hernia syndrome, Hyperlipidemia LDL goal <100 (02/07/2024), LBP (low back pain) (09/07/2012), Migraine without aura, with intractable migraine, so stated, without mention of status migrainosus, Obesity, Peripheral neuropathy, Primary hypertension (02/07/2024), Thoracic or lumbosacral neuritis or radiculitis, unspecified, and Vitamin D deficiency (02/07/2024).    She has no past medical history of Breast cancer (HCC).  Complete Blood Count:  Lab Results   Component Value Date/Time    WBC 6.7 05/17/2024 1315    RBC 4.39 05/17/2024 1315    HEMOGLOBIN 12.7 05/17/2024 1315    HEMATOCRIT 39.9 05/17/2024 1315    MCV 90.9 05/17/2024 1315    MCH 28.9 05/17/2024 1315    MCHC 31.8 (L) 05/17/2024 1315    RDW 63.5 (H) 05/17/2024 1315    MPV 11.2 05/17/2024 1315    LYMPHOCYTES 42.10 (H) 05/17/2024 1315    LYMPHS 2.81 05/17/2024 1315    MONOCYTES 4.00 05/17/2024 1315    MONOS 0.27 05/17/2024 1315    EOSINOPHILS 1.90 05/17/2024 1315    EOS 0.13 05/17/2024 1315    BASOPHILS 0.40 05/17/2024 1315    BASO 0.03 05/17/2024 1315    NRBC 0.00 05/17/2024 1315       Complete Metabolic Panel:  Lab Results   Component Value Date/Time    SODIUM 141 05/17/2024 01:15 PM    POTASSIUM 4.4 05/17/2024 01:15 PM    CHLORIDE 101 05/17/2024 01:15 PM    CO2 23 05/17/2024 01:15 PM    ANION 17.0 (H) 05/17/2024 01:15 PM    GLUCOSE 96 05/17/2024 01:15 PM    BUN 13 05/17/2024 01:15 PM    CREATININE 0.99 05/17/2024 01:15 PM    CREATININE 0.93 04/11/2011 08:15 AM    ASTSGOT 144 (H) 05/17/2024 01:15 PM    ALTSGPT 60 (H) 05/17/2024 01:15 PM    TBILIRUBIN 2.0 (H) 05/17/2024 01:15 PM    ALBUMIN 4.2 05/17/2024 01:15 PM    TOTPROTEIN 6.3 05/17/2024 01:15 PM    GLOBULIN 2.1 05/17/2024 01:15 PM    AGRATIO 2.0 05/17/2024 01:15 PM         GFR:    Lab Results   Component Value Date/Time    GFRCKD 66 05/17/2024 9783  "          ROS   Review of Systems   Constitutional: Negative.  Negative for fever, chills, weight loss, malaise/fatigue and diaphoresis.   HENT: Negative.  Negative for hearing loss, ear pain, nosebleeds, congestion, sore throat, neck pain, tinnitus and ear discharge.    Respiratory: Negative.  Negative for cough, hemoptysis, sputum production, shortness of breath, wheezing and stridor.    Cardiovascular: Negative.  Negative for chest pain, palpitations, orthopnea, claudication, leg swelling and PND.   Gastrointestinal: denies nausea, vomiting, diarrhea, constipation, heartburn, melena or hematochezia.  Genitourinary: Denies dysuria, hematuria, urinary incontinence, frequency or urgency.    Musculoskeletal: Negative.  Negative for myalgias and back pain.   Neurological: Negative.  Negative for dizziness, tingling, tremors, weakness and headaches.   Psych:  Denies depression, anxiety or insomnia.  All other systems reviewed and are negative.         Objective:     /82 (BP Location: Left arm, Patient Position: Sitting, BP Cuff Size: Adult)   Pulse 100   Temp 36.4 °C (97.5 °F) (Temporal)   Ht 1.651 m (5' 5\")   Wt 86 kg (189 lb 8 oz)   SpO2 95%  Body mass index is 31.53 kg/m².   Physical Exam    Physical Exam   Vitals reviewed.  Constitutional: oriented to person, place, and time. appears well-developed and well-nourished. No distress.   Neck: No JVD present.  Cardiovascular: Normal rate, regular rhythm, normal heart sounds and intact distal pulses.  Exam reveals no gallop and no friction rub.  No murmur heard.  No carotid bruits.   Pulmonary/Chest: Effort normal and breath sounds normal. No stridor. No respiratory distress. no wheezes or rales. exhibits no tenderness.   Musculoskeletal: Normal range of motion. exhibits no edema. jennifer pedal pulses 2+.  Lymphadenopathy: no cervical or supraclavicular adenopathy.   Neurological: alert and oriented to person, place, and time. exhibits normal muscle tone. " Coordination normal.   Skin: Skin is warm and dry. no diaphoresis.   Psychiatric: normal mood and affect. behavior is normal.           Assessment and Plan:   The following treatment plan was discussed      Assessment & Plan  1. Osteoporosis.  The patient's Fosamax treatment was discontinued due to exacerbation of GERD symptoms. Reclast has been ordered. The patient is advised to continue her regimen of calcium and vitamin D and engage in weight-bearing exercises.    2. Gastroesophageal reflux disease (GERD).  The patient's GERD symptoms have worsened, with an instance of nocturnal awakenings with an unpleasant taste in her mouth. The patient is advised to continue her omeprazole regimen and schedule a follow-up appointment with a gastroenterologist as planned.    3. Elevated liver function tests (LFTs).  The patient is instructed to discontinue Tylenol, alcohol, and Lipitor. A hepatic function panel will be re-evaluated in 3 weeks, with a follow-up for review. Should the lab results remain abnormal, a right upper quadrant ultrasound will be performed prior to her next appointment.    4. Hypercholesterolemia.  The patient is advised to maintain a healthy diet and engage in regular exercise. The patient's atorvastatin prescription will be refilled.    5. Insomnia.  The patient's insomnia is currently stable and well managed with doxepin. A 90-day refill of her doxepin prescription will be provided.    Follow-up  The patient is scheduled for a follow-up visit in 3 weeks.      ORDERS:  1. Elevated LFTs  HEPATIC FUNCTION PANEL    US-RUQ    stop tyl, ETOH, lipitor. huma HFP in 3 wks. f/u for review. if lab still abn do RUQ us before appt      2. Hyperlipidemia LDL goal <100      DC lipitor. continue healthy diet. enc pt to start regular exercise. decide when to huma LP based on lft results      3. Age-related osteoporosis without current pathological fracture  zoledronic Acid (RECLAST) 5 MG/100ML Solution IVPB premix     failed fosamax. ordered reclast.  continue ca++, vitamin d, wt bearing exercises      4. Primary insomnia  doxepin (SINEQUAN) 25 MG Cap    stable on doxepine 25 mg. f/u 3 wks.  RF med      5. Gastroesophageal reflux disease with esophagitis without hemorrhage      did not edison fosamax.  GERD worse.  continue omeprazole. f/u with GI as planned                Please note that this dictation was created using voice recognition software. I have made every reasonable attempt to correct obvious errors, but I expect that there are errors of grammar and possibly content that I did not discover before finalizing the note.      Attestation      Verbal consent was acquired by the patient to use [employer] ambient listening note generation during this visit Yes

## 2024-06-08 ENCOUNTER — HOSPITAL ENCOUNTER (OUTPATIENT)
Dept: LAB | Facility: MEDICAL CENTER | Age: 59
End: 2024-06-08
Attending: NURSE PRACTITIONER
Payer: COMMERCIAL

## 2024-06-08 DIAGNOSIS — R79.89 ELEVATED LFTS: ICD-10-CM

## 2024-06-08 PROCEDURE — 80076 HEPATIC FUNCTION PANEL: CPT

## 2024-06-08 PROCEDURE — 36415 COLL VENOUS BLD VENIPUNCTURE: CPT

## 2024-06-09 LAB
ALBUMIN SERPL BCP-MCNC: 3.9 G/DL (ref 3.2–4.9)
ALP SERPL-CCNC: 174 U/L (ref 30–99)
ALT SERPL-CCNC: 53 U/L (ref 2–50)
AST SERPL-CCNC: 242 U/L (ref 12–45)
BILIRUB CONJ SERPL-MCNC: <0.2 MG/DL (ref 0.1–0.5)
BILIRUB INDIRECT SERPL-MCNC: ABNORMAL MG/DL (ref 0–1)
BILIRUB SERPL-MCNC: 0.5 MG/DL (ref 0.1–1.5)
PROT SERPL-MCNC: 5.9 G/DL (ref 6–8.2)

## 2024-06-13 ENCOUNTER — APPOINTMENT (OUTPATIENT)
Dept: MEDICAL GROUP | Facility: MEDICAL CENTER | Age: 59
End: 2024-06-13
Payer: COMMERCIAL

## 2024-06-17 ENCOUNTER — APPOINTMENT (OUTPATIENT)
Dept: MEDICAL GROUP | Facility: MEDICAL CENTER | Age: 59
End: 2024-06-17
Payer: COMMERCIAL

## 2024-06-18 ENCOUNTER — HOSPITAL ENCOUNTER (OUTPATIENT)
Dept: RADIOLOGY | Facility: MEDICAL CENTER | Age: 59
End: 2024-06-18
Attending: NURSE PRACTITIONER
Payer: COMMERCIAL

## 2024-06-18 DIAGNOSIS — R79.89 ELEVATED LFTS: ICD-10-CM

## 2024-06-18 PROCEDURE — 76705 ECHO EXAM OF ABDOMEN: CPT

## 2024-06-19 ENCOUNTER — OFFICE VISIT (OUTPATIENT)
Dept: MEDICAL GROUP | Facility: MEDICAL CENTER | Age: 59
End: 2024-06-19
Payer: COMMERCIAL

## 2024-06-19 VITALS
HEART RATE: 94 BPM | HEIGHT: 65 IN | SYSTOLIC BLOOD PRESSURE: 128 MMHG | OXYGEN SATURATION: 94 % | TEMPERATURE: 98 F | DIASTOLIC BLOOD PRESSURE: 80 MMHG | BODY MASS INDEX: 33.24 KG/M2 | WEIGHT: 199.5 LBS

## 2024-06-19 DIAGNOSIS — R79.89 ELEVATED LFTS: ICD-10-CM

## 2024-06-19 DIAGNOSIS — R16.0 HEPATOMEGALY: ICD-10-CM

## 2024-06-19 DIAGNOSIS — K76.0 HEPATIC STEATOSIS: ICD-10-CM

## 2024-06-19 DIAGNOSIS — F51.01 PRIMARY INSOMNIA: ICD-10-CM

## 2024-06-19 PROCEDURE — 3079F DIAST BP 80-89 MM HG: CPT | Performed by: NURSE PRACTITIONER

## 2024-06-19 PROCEDURE — 3074F SYST BP LT 130 MM HG: CPT | Performed by: NURSE PRACTITIONER

## 2024-06-19 PROCEDURE — 99214 OFFICE O/P EST MOD 30 MIN: CPT | Performed by: NURSE PRACTITIONER

## 2024-06-19 ASSESSMENT — FIBROSIS 4 INDEX: FIB4 SCORE: 10.84

## 2025-01-08 ENCOUNTER — OFFICE VISIT (OUTPATIENT)
Dept: URGENT CARE | Facility: PHYSICIAN GROUP | Age: 60
End: 2025-01-08
Payer: MEDICAID

## 2025-01-08 ENCOUNTER — HOSPITAL ENCOUNTER (EMERGENCY)
Facility: MEDICAL CENTER | Age: 60
End: 2025-01-08
Attending: EMERGENCY MEDICINE
Payer: MEDICAID

## 2025-01-08 ENCOUNTER — APPOINTMENT (OUTPATIENT)
Dept: RADIOLOGY | Facility: MEDICAL CENTER | Age: 60
End: 2025-01-08
Attending: EMERGENCY MEDICINE
Payer: MEDICAID

## 2025-01-08 VITALS
HEIGHT: 66 IN | TEMPERATURE: 98 F | OXYGEN SATURATION: 96 % | SYSTOLIC BLOOD PRESSURE: 213 MMHG | HEART RATE: 97 BPM | WEIGHT: 199.96 LBS | BODY MASS INDEX: 32.14 KG/M2 | DIASTOLIC BLOOD PRESSURE: 119 MMHG | RESPIRATION RATE: 18 BRPM

## 2025-01-08 VITALS
RESPIRATION RATE: 14 BRPM | BODY MASS INDEX: 30.91 KG/M2 | HEIGHT: 66 IN | TEMPERATURE: 98.6 F | OXYGEN SATURATION: 97 % | DIASTOLIC BLOOD PRESSURE: 122 MMHG | HEART RATE: 93 BPM | SYSTOLIC BLOOD PRESSURE: 178 MMHG | WEIGHT: 192.3 LBS

## 2025-01-08 DIAGNOSIS — I10 PRIMARY HYPERTENSION: ICD-10-CM

## 2025-01-08 DIAGNOSIS — I10 HYPERTENSION, UNSPECIFIED TYPE: ICD-10-CM

## 2025-01-08 DIAGNOSIS — M79.671 BILATERAL FOOT PAIN: ICD-10-CM

## 2025-01-08 DIAGNOSIS — D64.9 ANEMIA, UNSPECIFIED TYPE: ICD-10-CM

## 2025-01-08 DIAGNOSIS — R60.0 BILATERAL LOWER EXTREMITY EDEMA: ICD-10-CM

## 2025-01-08 DIAGNOSIS — I16.0 HYPERTENSIVE URGENCY: ICD-10-CM

## 2025-01-08 DIAGNOSIS — M79.672 BILATERAL FOOT PAIN: ICD-10-CM

## 2025-01-08 DIAGNOSIS — R60.9 EDEMA, UNSPECIFIED TYPE: ICD-10-CM

## 2025-01-08 LAB
ALBUMIN SERPL BCP-MCNC: 4.3 G/DL (ref 3.2–4.9)
ALBUMIN/GLOB SERPL: 2.2 G/DL
ALP SERPL-CCNC: 114 U/L (ref 30–99)
ALT SERPL-CCNC: 7 U/L (ref 2–50)
ANION GAP SERPL CALC-SCNC: 11 MMOL/L (ref 7–16)
AST SERPL-CCNC: 16 U/L (ref 12–45)
BASOPHILS # BLD AUTO: 0.5 % (ref 0–1.8)
BASOPHILS # BLD: 0.02 K/UL (ref 0–0.12)
BILIRUB SERPL-MCNC: 0.3 MG/DL (ref 0.1–1.5)
BUN SERPL-MCNC: 7 MG/DL (ref 8–22)
CALCIUM ALBUM COR SERPL-MCNC: 8.7 MG/DL (ref 8.5–10.5)
CALCIUM SERPL-MCNC: 8.9 MG/DL (ref 8.5–10.5)
CHLORIDE SERPL-SCNC: 107 MMOL/L (ref 96–112)
CO2 SERPL-SCNC: 27 MMOL/L (ref 20–33)
CREAT SERPL-MCNC: 0.55 MG/DL (ref 0.5–1.4)
EKG IMPRESSION: NORMAL
EOSINOPHIL # BLD AUTO: 0.17 K/UL (ref 0–0.51)
EOSINOPHIL NFR BLD: 4.5 % (ref 0–6.9)
ERYTHROCYTE [DISTWIDTH] IN BLOOD BY AUTOMATED COUNT: 50.9 FL (ref 35.9–50)
GFR SERPLBLD CREATININE-BSD FMLA CKD-EPI: 105 ML/MIN/1.73 M 2
GLOBULIN SER CALC-MCNC: 2 G/DL (ref 1.9–3.5)
GLUCOSE SERPL-MCNC: 100 MG/DL (ref 65–99)
HCT VFR BLD AUTO: 32.7 % (ref 37–47)
HGB BLD-MCNC: 9.9 G/DL (ref 12–16)
IMM GRANULOCYTES # BLD AUTO: 0 K/UL (ref 0–0.11)
IMM GRANULOCYTES NFR BLD AUTO: 0 % (ref 0–0.9)
LYMPHOCYTES # BLD AUTO: 1.92 K/UL (ref 1–4.8)
LYMPHOCYTES NFR BLD: 50.4 % (ref 22–41)
MCH RBC QN AUTO: 25.6 PG (ref 27–33)
MCHC RBC AUTO-ENTMCNC: 30.3 G/DL (ref 32.2–35.5)
MCV RBC AUTO: 84.5 FL (ref 81.4–97.8)
MONOCYTES # BLD AUTO: 0.35 K/UL (ref 0–0.85)
MONOCYTES NFR BLD AUTO: 9.2 % (ref 0–13.4)
NEUTROPHILS # BLD AUTO: 1.35 K/UL (ref 1.82–7.42)
NEUTROPHILS NFR BLD: 35.4 % (ref 44–72)
NRBC # BLD AUTO: 0 K/UL
NRBC BLD-RTO: 0 /100 WBC (ref 0–0.2)
PLATELET # BLD AUTO: 269 K/UL (ref 164–446)
PMV BLD AUTO: 9.7 FL (ref 9–12.9)
POTASSIUM SERPL-SCNC: 3.6 MMOL/L (ref 3.6–5.5)
PROT SERPL-MCNC: 6.3 G/DL (ref 6–8.2)
RBC # BLD AUTO: 3.87 M/UL (ref 4.2–5.4)
SODIUM SERPL-SCNC: 145 MMOL/L (ref 135–145)
WBC # BLD AUTO: 3.8 K/UL (ref 4.8–10.8)

## 2025-01-08 PROCEDURE — 99283 EMERGENCY DEPT VISIT LOW MDM: CPT

## 2025-01-08 PROCEDURE — 71045 X-RAY EXAM CHEST 1 VIEW: CPT

## 2025-01-08 PROCEDURE — 93005 ELECTROCARDIOGRAM TRACING: CPT | Mod: TC | Performed by: EMERGENCY MEDICINE

## 2025-01-08 PROCEDURE — 36415 COLL VENOUS BLD VENIPUNCTURE: CPT

## 2025-01-08 PROCEDURE — 80053 COMPREHEN METABOLIC PANEL: CPT

## 2025-01-08 PROCEDURE — 99214 OFFICE O/P EST MOD 30 MIN: CPT | Performed by: PHYSICIAN ASSISTANT

## 2025-01-08 PROCEDURE — 85025 COMPLETE CBC W/AUTO DIFF WBC: CPT

## 2025-01-08 RX ORDER — HYDROCHLOROTHIAZIDE 25 MG/1
25 TABLET ORAL DAILY
Qty: 45 TABLET | Refills: 0 | Status: SHIPPED | OUTPATIENT
Start: 2025-01-08 | End: 2025-02-22

## 2025-01-08 RX ORDER — LISINOPRIL 20 MG/1
20 TABLET ORAL DAILY
Qty: 45 TABLET | Refills: 0 | Status: SHIPPED | OUTPATIENT
Start: 2025-01-08 | End: 2025-02-22

## 2025-01-08 RX ORDER — IBUPROFEN 100 MG/5ML
10 SUSPENSION ORAL EVERY 6 HOURS PRN
COMMUNITY

## 2025-01-08 ASSESSMENT — FIBROSIS 4 INDEX
FIB4 SCORE: 10.84
FIB4 SCORE: 10.84

## 2025-01-08 NOTE — ED TRIAGE NOTES
".  Chief Complaint   Patient presents with    Blood Pressure Problem     Off medication x 1 year.     Sent from Urgent Care     \"Dx: HTN emgerency   225/125  BLE edema\"         60 yo female ambulatory to triage for above complaint. Denies CP/HA.       Pt is alert and oriented, speaking in full sentences, follows commands and responds appropriately to questions.      Patient placed back in lobby and educated on triage process. Asked to inform RN of any changes or concerns.     BP (!) 190/118   Pulse (!) 101   Temp 36.8 °C (98.2 °F) (Temporal)   Resp 18   Ht 1.676 m (5' 6\")   Wt 90.7 kg (199 lb 15.3 oz)   LMP 08/03/1999   SpO2 98%   BMI 32.27 kg/m²     "

## 2025-01-08 NOTE — PROGRESS NOTES
"Subjective:   Madhavi Coffman is a 59 y.o. female who presents for Foot Problem (X 2month, severe pain, both leg swelling, high BP)      HPI  The patient presents to the Urgent Care primarily with complaints of high blood pressure and bilateral lower extremity edema.  2 months ago she started working a full-time job and is on her feet a lot and developed bilateral foot pain primarily located to her toes and inside of her ankles.  She has tried insoles, Voltaren gel, lidocaine, and ibuprofen without much relief.  For the past 2 days she noticed bilateral ankle and feet swelling.  She took her BP this morning which read 225/125.  She has been off BP medication for about a year due to insurance issues.  No history of CHF.  She denies any chest pain or shortness of breath.    Past Medical History:   Diagnosis Date    Anxiety     Arthritis     back, osteoarthritis    DDD (degenerative disc disease), lumbar 02/07/2024    Depression     Diverticulosis     GERD with esophagitis     Hiatus hernia syndrome     had surgical repair    Hyperlipidemia LDL goal <100 02/07/2024    LBP (low back pain) 09/07/2012    8/10    Migraine without aura, with intractable migraine, so stated, without mention of status migrainosus     Obesity     Peripheral neuropathy     Primary hypertension 02/07/2024    Bp poorly controlled today.  Needs refills for lisinopril  and HCTZ    Thoracic or lumbosacral neuritis or radiculitis, unspecified     bilateral L4-S1 radiculopathies    Vitamin D deficiency 02/07/2024     Allergies   Allergen Reactions    Neosporin [Neomycin-Bacitracin-Polymyxin] Vomiting and Swelling    Sudafed [Pseudoephedrine Hcl] Rash          Cleocin [Clindamycin Hcl]      Stomach upset    Tape Contact Dermatitis        Objective:     BP (!) 178/122 (BP Location: Right arm, Patient Position: Sitting, BP Cuff Size: Adult)   Pulse 93   Temp 37 °C (98.6 °F) (Temporal)   Resp 14   Ht 1.676 m (5' 6\")   Wt 87.2 kg (192 lb 4.8 oz)  "  LMP 1999   SpO2 97%   BMI 31.04 kg/m²     Physical Exam  Vitals reviewed.   Constitutional:       General: She is not in acute distress.     Appearance: Normal appearance. She is not ill-appearing or toxic-appearing.   Eyes:      Conjunctiva/sclera: Conjunctivae normal.   Cardiovascular:      Rate and Rhythm: Normal rate and regular rhythm.      Heart sounds: Normal heart sounds.   Pulmonary:      Effort: Pulmonary effort is normal. No respiratory distress.      Breath sounds: Normal breath sounds. No wheezing, rhonchi or rales.   Musculoskeletal:      Cervical back: Neck supple.      Right lower le+ Edema present.      Left lower le+ Edema present.      Right ankle: Swelling present.      Left ankle: Swelling present.      Right foot: Swelling and tenderness (plantar feet) present.      Left foot: Swelling and tenderness (plantar feet) present.   Skin:     General: Skin is warm and dry.   Neurological:      General: No focal deficit present.      Mental Status: She is alert and oriented to person, place, and time.   Psychiatric:         Mood and Affect: Mood normal.         Behavior: Behavior normal.         Diagnosis and associated orders:     1. Hypertensive urgency    2. Bilateral lower extremity edema    3. Bilateral foot pain  - Referral to Podiatry    Other orders  - ibuprofen (MOTRIN) 100 MG/5ML Suspension; Take 10 mg/kg by mouth every 6 hours as needed.       Comments/MDM:     Foot pain for 2 months likely separate issue than bilateral lower extremity edema and hypertensive emergency.  BP here in the clinic 178/122.  She has been off her BP medication for about a year.  Discussed differential diagnosis.  I am concerned about her high blood pressure.  I feel patient requires further evaluation.  Therefore, through shared decision making, it was agreed patient is going to present immediately to the ER for higher level of evaluation and care.  Patient verbalized understanding and agreed to  plan.  She is going to present to the [facility] ER via private vehicle.  She is otherwise stable.  Possible separate plantar fasciitis.  Recommend stretches as demonstrated in the clinic in the mornings and nights.  Elevation, comfortable shoes with supports.  Will place referral to podiatry for this issue.         Please note that this dictation was created using voice recognition software. I have made a reasonable attempt to correct obvious errors, but I expect that there are errors of grammar and possibly content that I did not discover before finalizing the note.    This note was electronically signed by Alex Yepez PA-C

## 2025-01-09 NOTE — ED PROVIDER NOTES
"ED Provider Note    ED PHYSICIAN NOTE    CHIEF COMPLAINT  Chief Complaint   Patient presents with    Blood Pressure Problem     Off medication x 1 year.     Sent from Urgent Care     \"Dx: HTN emgerency   225/125  BLE edema\"         EXTERNAL RECORDS REVIEWED  Outpatient Notes urgent care visit from earlier today patient presented with foot pain for 2 months as well as some swelling to her ankles.  Was noted to be hypertensive and sent here    HPI/ROS  LIMITATION TO HISTORY   Select: : None  OUTSIDE HISTORIAN(S):  none    Madhavi Coffman is a 59 y.o. female who presents with concerns of blood pressure.  Patient went to urgent care earlier today as she has had some swelling to her ankles over the last few days, although has had some pain for a couple of months.  She was noted to be hypertensive and she was sent here.  She reports no chest pain or shortness of breath.  No headaches, no visual symptoms, no focal weakness or numbness.  No abdominal pain nausea or vomiting.  She does report a history of hypertension was on lisinopril and HCTZ although has been off these for around 1 year due to insurance reasons    PAST MEDICAL HISTORY  Past Medical History:   Diagnosis Date    Anxiety     Arthritis     back, osteoarthritis    DDD (degenerative disc disease), lumbar 02/07/2024    Depression     Diverticulosis     GERD with esophagitis     Hiatus hernia syndrome     had surgical repair    Hyperlipidemia LDL goal <100 02/07/2024    LBP (low back pain) 09/07/2012    8/10    Migraine without aura, with intractable migraine, so stated, without mention of status migrainosus     Obesity     Peripheral neuropathy     Primary hypertension 02/07/2024    Bp poorly controlled today.  Needs refills for lisinopril  and HCTZ    Thoracic or lumbosacral neuritis or radiculitis, unspecified     bilateral L4-S1 radiculopathies    Vitamin D deficiency 02/07/2024       SOCIAL HISTORY  Social History     Tobacco Use    Smoking status: " "Former     Current packs/day: 0.00     Types: Cigarettes     Quit date: 2024     Years since quittin.0    Smokeless tobacco: Never    Tobacco comments:     .  Quit smoking 2022   Vaping Use    Vaping status: Never Used   Substance Use Topics    Alcohol use: Yes     Alcohol/week: 0.5 oz     Types: 1 Standard drinks or equivalent per week     Comment: 2 per week    Drug use: No       CURRENT MEDICATIONS  Home Medications       Reviewed by Jacki Maier R.N. (Registered Nurse) on 25 at 1558  Med List Status: Not Addressed     Medication Last Dose Status   hydroCHLOROthiazide 25 MG Tab  Active   ibuprofen (MOTRIN) 100 MG/5ML Suspension  Active   lisinopril (PRINIVIL) 40 MG tablet  Active   omeprazole (PRILOSEC) 20 MG delayed-release capsule  Active   vitamin D2, Ergocalciferol, (DRISDOL) 1.25 MG (74933 UT) Cap capsule  Active                    ALLERGIES  Allergies   Allergen Reactions    Neosporin [Neomycin-Bacitracin-Polymyxin] Vomiting and Swelling    Sudafed [Pseudoephedrine Hcl] Rash          Cleocin [Clindamycin Hcl]      Stomach upset    Tape Contact Dermatitis       PHYSICAL EXAM  VITAL SIGNS: BP (!) 213/119   Pulse 97   Temp 36.7 °C (98 °F) (Temporal)   Resp 18   Ht 1.676 m (5' 6\")   Wt 90.7 kg (199 lb 15.3 oz)   LMP 1999   SpO2 96%   BMI 32.27 kg/m²    Constitutional: Alert in no apparent distress.  HENT: No signs of trauma, grossly normal inspection   Eyes: Pupils are equal and reactive, Conjunctiva normal, Non-icteric.   Neck: Normal range of motion, No tenderness, Supple, No stridor.   Cardiovascular: Regular rate and rhythm, no murmurs.  Thorax & Lungs: Normal breath sounds, No respiratory distress, No wheezing, No chest tenderness.   Abdomen:, Soft, No tenderness, No masses, No pulsatile masses. No peritoneal signs.  Skin: Warm, Dry, no obvious rash  Back: No bony tenderness, No CVA tenderness.   Extremities: trace BLLE edema to ankles, No tenderness, No cyanosis, " Negative Haley's sign.  Musculoskeletal: No major deformities noted.   Neurologic: Alert, cranial nerves intact, no nystagmus, speech is appropriate or not slurred, upper extremities bilaterally exhibit no drift, no dysmetria, 5 out of 5 strength with bilateral bicep/tricep/, sensation intact to light touch throughout upper extremities.  No drift or lower extremities, sensation intact to light touch.  No focal deficits noted. Ambulates with steady gait,  Psychiatric: Affect normal and mood normal for situation          DIAGNOSTIC STUDIES / PROCEDURES  LABS/EKG  Results for orders placed or performed during the hospital encounter of 01/08/25   CBC WITH DIFFERENTIAL    Collection Time: 01/08/25  4:55 PM   Result Value Ref Range    WBC 3.8 (L) 4.8 - 10.8 K/uL    RBC 3.87 (L) 4.20 - 5.40 M/uL    Hemoglobin 9.9 (L) 12.0 - 16.0 g/dL    Hematocrit 32.7 (L) 37.0 - 47.0 %    MCV 84.5 81.4 - 97.8 fL    MCH 25.6 (L) 27.0 - 33.0 pg    MCHC 30.3 (L) 32.2 - 35.5 g/dL    RDW 50.9 (H) 35.9 - 50.0 fL    Platelet Count 269 164 - 446 K/uL    MPV 9.7 9.0 - 12.9 fL    Neutrophils-Polys 35.40 (L) 44.00 - 72.00 %    Lymphocytes 50.40 (H) 22.00 - 41.00 %    Monocytes 9.20 0.00 - 13.40 %    Eosinophils 4.50 0.00 - 6.90 %    Basophils 0.50 0.00 - 1.80 %    Immature Granulocytes 0.00 0.00 - 0.90 %    Nucleated RBC 0.00 0.00 - 0.20 /100 WBC    Neutrophils (Absolute) 1.35 (L) 1.82 - 7.42 K/uL    Lymphs (Absolute) 1.92 1.00 - 4.80 K/uL    Monos (Absolute) 0.35 0.00 - 0.85 K/uL    Eos (Absolute) 0.17 0.00 - 0.51 K/uL    Baso (Absolute) 0.02 0.00 - 0.12 K/uL    Immature Granulocytes (abs) 0.00 0.00 - 0.11 K/uL    NRBC (Absolute) 0.00 K/uL   COMP METABOLIC PANEL    Collection Time: 01/08/25  4:55 PM   Result Value Ref Range    Sodium 145 135 - 145 mmol/L    Potassium 3.6 3.6 - 5.5 mmol/L    Chloride 107 96 - 112 mmol/L    Co2 27 20 - 33 mmol/L    Anion Gap 11.0 7.0 - 16.0    Glucose 100 (H) 65 - 99 mg/dL    Bun 7 (L) 8 - 22 mg/dL    Creatinine  0.55 0.50 - 1.40 mg/dL    Calcium 8.9 8.5 - 10.5 mg/dL    Correct Calcium 8.7 8.5 - 10.5 mg/dL    AST(SGOT) 16 12 - 45 U/L    ALT(SGPT) 7 2 - 50 U/L    Alkaline Phosphatase 114 (H) 30 - 99 U/L    Total Bilirubin 0.3 0.1 - 1.5 mg/dL    Albumin 4.3 3.2 - 4.9 g/dL    Total Protein 6.3 6.0 - 8.2 g/dL    Globulin 2.0 1.9 - 3.5 g/dL    A-G Ratio 2.2 g/dL   ESTIMATED GFR    Collection Time: 25  4:55 PM   Result Value Ref Range    GFR (CKD-EPI) 105 >60 mL/min/1.73 m 2   EKG (NOW)    Collection Time: 25  5:35 PM   Result Value Ref Range    Report       [facility]    Test Date:  2025  Pt Name:    SINGH DUARTE               Department: ER  MRN:        [id number]                      Room:       [facility]  Gender:     Female                       Technician: [id number]  :        1965                   Requested By:BELLE LEIGH  Order #:    [id number]                    Reading MD: BELLE LEIGH MD    Measurements  Intervals                                Axis  Rate:       82                           P:          10  NY:         129                          QRS:        1  QRSD:       104                          T:          16  QT:         441  QTc:        515    Interpretive Statements  Sinus rhythm  Prolonged QT interval  Compared to ECG 2022 06:49:35  Prolonged QT interval now present  T-wave abnormality no longer present  Electronically Signed On 2025 17:35:10 PST by BELLE LEIGH MD       *Note: Due to a large number of results and/or encounters for the requested time period, some results have not been displayed. A complete set of results can be found in Results Review.         RADIOLOGY  I have independently interpreted the diagnostic imaging associated with this visit and am waiting the final reading from the radiologist.   My preliminary interpretation is as follows: no edema    DX-CHEST-PORTABLE (1 VIEW)   Final Result      No acute cardiopulmonary  abnormality.            COURSE & MEDICAL DECISION MAKING    INITIAL ASSESSMENT, COURSE AND PLAN  Care Narrative: 5:08 PM  Patient presents with some mild swelling to her feet as well as elevated blood pressure.  Likely poorly controlled baseline hypertension although consideration for endorgan damage, renal insufficiency, no obvious findings to suggest heart failure or volume overload.  Have ordered for diagnostic labs, ECG    6:32 PM  Patient is reevaluated and updated on results, she is comfortable with discharge plan        Interventions  Medications - No data to display           PROBLEM LIST    # Hypertension.  At this point no findings of endorgan damage.   There is no chest pain, shortness of breath, palpitations, VOSS or other cardiopulmonary sx and along with the unremarkable ECG there is no evidence of end organ damage.  There is no neurologic deficit or headache or visual symptoms or other neurologic symptoms to suggest end organ damage.  Normal creatinine.  Does have trace edema but no findings of heart failure.  She has been on lisinopril and HCTZ in the past and I will restart these and she does have a follow-up appointment with primary care in around 1 month    # Pedal edema.  Patient with mild pedal edema.  Started on HCTZ, discussed conservative measures as well    # Anemia.  In review of her records patient has had similar anemia in the past.  Discussed this with her which she is aware of.  No findings of emergent process.  She will follow-up with primary care      DISPOSITION AND DISCUSSIONS      Barriers to care at this time, including but not limited to:  Did not have established primary care although has been able to schedule this for a month .     Prescription drugs considered and/or prescribed:     Prescribed   Discharge Medication List as of 1/8/2025  6:43 PM        START taking these medications    Details   !! lisinopril (PRINIVIL) 20 MG Tab Take 1 Tablet by mouth every day for 45 days.,  Disp-45 Tablet, R-0, Normal       !! - Potential duplicate medications found. Please discuss with provider.         The patient will return for new or worsening symptoms and is stable at the time of discharge.    The patient is referred to a primary physician for blood pressure management, diabetic screening, and for all other preventative health concerns.        DISPOSITION:  Patient will be discharged home in stable condition.    FOLLOW UP:  At your primary care visit in February            OUTPATIENT MEDICATIONS:  Discharge Medication List as of 1/8/2025  6:43 PM        START taking these medications    Details   !! lisinopril (PRINIVIL) 20 MG Tab Take 1 Tablet by mouth every day for 45 days., Disp-45 Tablet, R-0, Normal       !! - Potential duplicate medications found. Please discuss with provider.              FINAL DIAGNOSIS  1. Primary hypertension  hydroCHLOROthiazide 25 MG Tab    lisinopril (PRINIVIL) 20 MG Tab    refill lisinopril & HCTZ. monitor BP. do lab, f/u 1 mo for review lab, pap smear & BP ck      2. Hypertension, unspecified type        3. Edema, unspecified type        4. Anemia, unspecified type               This dictation was created using voice recognition software. The accuracy of the dictation is limited to the abilities of the software. I expect there may be some errors of grammar and possibly content. The nursing notes were reviewed and certain aspects of this information were incorporated into this note.    Electronically signed by: Ko Segura M.D., 1/8/2025

## 2025-01-09 NOTE — DISCHARGE INSTRUCTIONS
Please take the blood pressure medications as directed.  Additionally would advise monitoring your blood pressure for the next few weeks so you can take these readings to your primary care doctor to see if your medications need to be adjusted.  Seek more immediate medical attention for any new headaches, focal weakness or numbness chest pain, passing out or other concerns

## 2025-02-08 ENCOUNTER — OFFICE VISIT (OUTPATIENT)
Dept: URGENT CARE | Facility: PHYSICIAN GROUP | Age: 60
End: 2025-02-08
Payer: MEDICAID

## 2025-02-08 VITALS
HEART RATE: 98 BPM | OXYGEN SATURATION: 98 % | WEIGHT: 192 LBS | HEIGHT: 65 IN | SYSTOLIC BLOOD PRESSURE: 108 MMHG | TEMPERATURE: 98 F | DIASTOLIC BLOOD PRESSURE: 62 MMHG | RESPIRATION RATE: 12 BRPM | BODY MASS INDEX: 31.99 KG/M2

## 2025-02-08 DIAGNOSIS — R10.13 EPIGASTRIC PAIN: ICD-10-CM

## 2025-02-08 DIAGNOSIS — R19.7 DIARRHEA, UNSPECIFIED TYPE: ICD-10-CM

## 2025-02-08 PROCEDURE — 3078F DIAST BP <80 MM HG: CPT | Performed by: PHYSICIAN ASSISTANT

## 2025-02-08 PROCEDURE — 3074F SYST BP LT 130 MM HG: CPT | Performed by: PHYSICIAN ASSISTANT

## 2025-02-08 PROCEDURE — 99214 OFFICE O/P EST MOD 30 MIN: CPT | Performed by: PHYSICIAN ASSISTANT

## 2025-02-08 ASSESSMENT — ENCOUNTER SYMPTOMS
FLANK PAIN: 0
ABDOMINAL PAIN: 1
CONSTIPATION: 0
FEVER: 0
COUGH: 0
PALPITATIONS: 0
NAUSEA: 1
SHORTNESS OF BREATH: 0
DIARRHEA: 1
BLOOD IN STOOL: 0
CHILLS: 0
VOMITING: 1

## 2025-02-08 ASSESSMENT — FIBROSIS 4 INDEX: FIB4 SCORE: 1.33

## 2025-02-08 NOTE — PROGRESS NOTES
Subjective:   Madhavi Coffman is a 59 y.o. female who presents today with   Chief Complaint   Patient presents with    GI Problem     X2weeks, Stomach pain, vomiting, diarrhea, severe pain in stomach, pain when moving or bending causing light head/dizzy     GI Problem  This is a new problem. The current episode started 1 to 4 weeks ago. The problem occurs constantly. The problem has been unchanged. Associated symptoms include abdominal pain, nausea and vomiting. Pertinent negatives include no chest pain, chills, coughing or fever.     Patient does have history of gastric bypass surgery 20 years ago.  She states that the pain comes and goes but is usually just around a 3 or 4.  When it is at its worst it is at a 9/10.  She states there are no specific triggers.  She ate some meat loaf and mashed potatoes earlier today and it was fine but when she ate a gummy peach ring she had pain immediately afterward.  Does seem to be triggered with foods.  Patient states she has occasional formed stools but mostly diarrhea over the last couple of weeks.  No triggers or risk factors regarding diarrhea including any recent antibiotics or travel.    PMH:  has a past medical history of Anxiety, Arthritis, DDD (degenerative disc disease), lumbar (02/07/2024), Depression, Diverticulosis, GERD with esophagitis, Hiatus hernia syndrome, Hyperlipidemia LDL goal <100 (02/07/2024), LBP (low back pain) (09/07/2012), Migraine without aura, with intractable migraine, so stated, without mention of status migrainosus, Obesity, Peripheral neuropathy, Primary hypertension (02/07/2024), Thoracic or lumbosacral neuritis or radiculitis, unspecified, and Vitamin D deficiency (02/07/2024).    She has no past medical history of Breast cancer (HCC).  MEDS:   Current Outpatient Medications:     ibuprofen (MOTRIN) 100 MG/5ML Suspension, Take 10 mg/kg by mouth every 6 hours as needed., Disp: , Rfl:     hydroCHLOROthiazide 25 MG Tab, Take 1 Tablet by  mouth every day for 45 days., Disp: 45 Tablet, Rfl: 0    lisinopril (PRINIVIL) 20 MG Tab, Take 1 Tablet by mouth every day for 45 days., Disp: 45 Tablet, Rfl: 0    omeprazole (PRILOSEC) 20 MG delayed-release capsule, Take 1 Capsule by mouth every day. (Patient not taking: Reported on 2/8/2025), Disp: 90 Capsule, Rfl: 3    lisinopril (PRINIVIL) 40 MG tablet, Take 1 Tablet by mouth every day. (Patient not taking: Reported on 2/8/2025), Disp: 90 Tablet, Rfl: 3    vitamin D2, Ergocalciferol, (DRISDOL) 1.25 MG (35920 UT) Cap capsule, Take 1 Capsule by mouth every 7 days. (Patient not taking: Reported on 2/8/2025), Disp: 12 Capsule, Rfl: 0  ALLERGIES:   Allergies   Allergen Reactions    Neosporin [Neomycin-Bacitracin-Polymyxin] Vomiting and Swelling    Sudafed [Pseudoephedrine Hcl] Rash          Cleocin [Clindamycin Hcl]      Stomach upset    Tape Contact Dermatitis     SURGHX:   Past Surgical History:   Procedure Laterality Date    INJ,SACROILIAC,ANES/STEROID  10/18/2013    Performed by Sonny Archuleta D.O. at [facility]    INJ,SACROILIAC,ANES/STEROID  8/16/2013    Performed by Sonny Archuleta D.O. at [facility]    INJ,SACROILIAC,ANES/STEROID  8/16/2013    Performed by Sonny Archuleta D.O. at [facility]    NEURO DEST FACET L/S W/IG SNGL  4/19/2013    Performed by Sonny Archuleta D.O. at [facility]    NEURO DEST FACET L/S W/IG ADDL  4/19/2013    Performed by Sonny Archuleta D.O. at [facility]    NEURO DEST FACET L/S W/IG ADDL  4/19/2013    Performed by Sonny Archuleta D.O. at [facility] OR[facility]    NEURO DEST FACET L/S W/IG SNGL  3/29/2013    Performed by Sonny Archuleta D.O. at [facility]    NEURO DEST FACET L/S W/IG ADDL  3/29/2013    Performed by Sonny Archuleta D.O. at [facility]    NEURO DEST FACET L/S W/IG ADDL  3/29/2013    Performed by Sonny Archuleta D.O. at [facility]    NEURO DEST FACET L/S W/IG ADDL  3/29/2013    Performed by Sonny Jones D.O. at [facility]    INJ,SACROILIAC,ANES/STEROID  12/28/2012    Performed by Sonny Jones D.O. at [facility]    INJ,SACROILIAC,ANES/STEROID  11/9/2012    Performed by Sonny Jones D.O. at [facility]    THORACIC LAMINECTOMY  9/18/2012    Performed by King Leal M.D. at SUR[facility]    SPINAL CORD STIMULATOR  9/18/2012    Performed by King Leal M.D. at [facility]    INJ,SACROILIAC,ANES/STEROID  7/27/2012    Performed by SONNY JONES at [facility]    INJ,SACROILIAC,ANES/STEROID  7/27/2012    Performed by SONNY JONES at [facility]    MT PERCUT IMPLNT NEUROELECT,EPIDURAL  6/22/2012    Performed by SONNY JONES at [facility]    MT PERCUT IMPLNT NEUROELECT,EPIDURAL  6/22/2012    Performed by SONNY JONES at [facility]    FUSION, SPINE, LUMBAR, PLIF  7/26/2011    Performed by DANIAL KABA at SUR[facility]ERY [facility] ORS    LUMBAR DECOMPRESSION  7/26/2011    Performed by DANIAL KABA at [facility]    HARDWARE REMOVAL ORTHO  7/26/2011    Performed by DANIAL KABA at [facility]    S I JOINT FUSION  11/11/2010    Performed by DANIAL KABA at [facility]    GASTRIC BYPASS LAPAROSCOPIC  4/5/2010    Performed by GANSER, JOHN H at [facility] OR[facility]    HIATAL HERNIA REPAIR  4/5/2010    Performed by GANSER, JOHN H at [facility] ORS    FUSION, SPINE, LUMBAR, PLIF  11/4/2009    Performed by DANIAL KABA at SUR[facility]ERY [facility]    LUMBAR DECOMPRESSION  11/4/2009    Performed by DANIAL KABA at SUR[facility]    MT REMOVAL OF OVARY/TUBE(S)  2005    left    SIGMOID COLECTOMY  2004    OTHER  1999    las surgery    TONSILLECTOMY  1986    CHOLECYSTECTOMY      GASTRIC BYPASS LAPAROSCOPIC       "x2    KS BREAST REDUCTION       SOCHX:  reports that she quit smoking about 13 months ago. Her smoking use included cigarettes. She has never used smokeless tobacco. She reports current alcohol use of about 0.5 oz of alcohol per week. She reports that she does not use drugs.  FH: Reviewed with patient, not pertinent to this visit.     Review of Systems   Constitutional:  Negative for chills and fever.   Respiratory:  Negative for cough and shortness of breath.    Cardiovascular:  Negative for chest pain and palpitations.   Gastrointestinal:  Positive for abdominal pain, diarrhea, nausea and vomiting. Negative for blood in stool and constipation.   Genitourinary:  Negative for dysuria, flank pain, frequency, hematuria and urgency.      Objective:   /62 (BP Location: Left arm, Patient Position: Sitting, BP Cuff Size: Adult)   Pulse 98   Temp 36.7 °C (98 °F) (Temporal)   Resp 12   Ht 1.651 m (5' 5\")   Wt 87.1 kg (192 lb)   LMP 08/03/1999   SpO2 98%   BMI 31.95 kg/m²   Physical Exam  Vitals and nursing note reviewed.   Constitutional:       General: She is not in acute distress.     Appearance: Normal appearance. She is well-developed. She is not ill-appearing or toxic-appearing.   HENT:      Head: Normocephalic and atraumatic.      Right Ear: Hearing normal.      Left Ear: Hearing normal.   Cardiovascular:      Rate and Rhythm: Normal rate and regular rhythm.      Heart sounds: Normal heart sounds.   Pulmonary:      Effort: Pulmonary effort is normal.      Breath sounds: Normal breath sounds.   Abdominal:      General: Bowel sounds are normal. There is no distension.      Palpations: Abdomen is soft. There is no pulsatile mass.      Tenderness: There is abdominal tenderness in the epigastric area and left upper quadrant. There is no guarding or rebound.       Musculoskeletal:      Comments: Normal movement in all 4 extremities   Skin:     General: Skin is warm and dry.   Neurological:      Mental Status: " She is alert.      Coordination: Coordination normal.   Psychiatric:         Mood and Affect: Mood normal.         Assessment/Plan:   Assessment    1. Epigastric pain    At this time discussed with patient that differential could include gastritis versus pancreatitis.  Vital signs are stable on exam.  Would recommend going to the ER at this time for higher level of care and evaluation including imaging and labs.  Patient states she does not want to go and to the ER tonight but will go in the morning.  I discussed potential risk of not going to the ER tonight.  I offered to run urinalysis but patient declines as she is not having any urinary symptoms.  She understands that we cannot completely work this up in urgent care setting today and that I recommend she go to the ER immediately.    Differential diagnosis, natural history, supportive care, and indications for immediate follow-up discussed.   Patient given instructions and understanding of medications and treatment.    If not improving in 3-5 days, F/U with PCP or return to UC if symptoms worsen.      Patient agreeable to plan.      Please note that this dictation was created using voice recognition software. I have made every reasonable attempt to correct obvious errors, but I expect that there are errors of grammar and possibly content that I did not discover before finalizing the note.    Amarjit Frey PA-C

## 2025-02-09 ENCOUNTER — HOSPITAL ENCOUNTER (INPATIENT)
Facility: MEDICAL CENTER | Age: 60
LOS: 2 days | DRG: 684 | End: 2025-02-11
Attending: EMERGENCY MEDICINE | Admitting: HOSPITALIST
Payer: MEDICAID

## 2025-02-09 ENCOUNTER — APPOINTMENT (OUTPATIENT)
Dept: RADIOLOGY | Facility: MEDICAL CENTER | Age: 60
DRG: 684 | End: 2025-02-09
Attending: EMERGENCY MEDICINE
Payer: MEDICAID

## 2025-02-09 DIAGNOSIS — D50.9 IRON DEFICIENCY ANEMIA, UNSPECIFIED IRON DEFICIENCY ANEMIA TYPE: ICD-10-CM

## 2025-02-09 DIAGNOSIS — R10.12 LEFT UPPER QUADRANT ABDOMINAL PAIN: ICD-10-CM

## 2025-02-09 DIAGNOSIS — I10 PRIMARY HYPERTENSION: ICD-10-CM

## 2025-02-09 DIAGNOSIS — K27.9 PUD (PEPTIC ULCER DISEASE): ICD-10-CM

## 2025-02-09 DIAGNOSIS — N17.9 AKI (ACUTE KIDNEY INJURY) (HCC): ICD-10-CM

## 2025-02-09 PROBLEM — D64.9 ANEMIA: Status: ACTIVE | Noted: 2025-02-09

## 2025-02-09 PROBLEM — K25.9 GASTRIC ULCER: Status: ACTIVE | Noted: 2025-02-09

## 2025-02-09 LAB
ALBUMIN SERPL BCP-MCNC: 4.3 G/DL (ref 3.2–4.9)
ALBUMIN/GLOB SERPL: 1.7 G/DL
ALP SERPL-CCNC: 167 U/L (ref 30–99)
ALT SERPL-CCNC: 10 U/L (ref 2–50)
ANION GAP SERPL CALC-SCNC: 14 MMOL/L (ref 7–16)
AST SERPL-CCNC: 21 U/L (ref 12–45)
BASOPHILS # BLD AUTO: 0.3 % (ref 0–1.8)
BASOPHILS # BLD: 0.02 K/UL (ref 0–0.12)
BILIRUB SERPL-MCNC: 0.8 MG/DL (ref 0.1–1.5)
BUN SERPL-MCNC: 30 MG/DL (ref 8–22)
CALCIUM ALBUM COR SERPL-MCNC: 9.4 MG/DL (ref 8.5–10.5)
CALCIUM SERPL-MCNC: 9.6 MG/DL (ref 8.5–10.5)
CHLORIDE SERPL-SCNC: 99 MMOL/L (ref 96–112)
CO2 SERPL-SCNC: 24 MMOL/L (ref 20–33)
CREAT SERPL-MCNC: 2.04 MG/DL (ref 0.5–1.4)
EOSINOPHIL # BLD AUTO: 0.11 K/UL (ref 0–0.51)
EOSINOPHIL NFR BLD: 1.6 % (ref 0–6.9)
ERYTHROCYTE [DISTWIDTH] IN BLOOD BY AUTOMATED COUNT: 53.1 FL (ref 35.9–50)
GFR SERPLBLD CREATININE-BSD FMLA CKD-EPI: 27 ML/MIN/1.73 M 2
GLOBULIN SER CALC-MCNC: 2.5 G/DL (ref 1.9–3.5)
GLUCOSE SERPL-MCNC: 101 MG/DL (ref 65–99)
HCG SERPL QL: NEGATIVE
HCT VFR BLD AUTO: 34.9 % (ref 37–47)
HGB BLD-MCNC: 10.7 G/DL (ref 12–16)
IMM GRANULOCYTES # BLD AUTO: 0.02 K/UL (ref 0–0.11)
IMM GRANULOCYTES NFR BLD AUTO: 0.3 % (ref 0–0.9)
LIPASE SERPL-CCNC: 28 U/L (ref 11–82)
LYMPHOCYTES # BLD AUTO: 1.61 K/UL (ref 1–4.8)
LYMPHOCYTES NFR BLD: 23.6 % (ref 22–41)
MCH RBC QN AUTO: 25.5 PG (ref 27–33)
MCHC RBC AUTO-ENTMCNC: 30.7 G/DL (ref 32.2–35.5)
MCV RBC AUTO: 83.1 FL (ref 81.4–97.8)
MONOCYTES # BLD AUTO: 0.45 K/UL (ref 0–0.85)
MONOCYTES NFR BLD AUTO: 6.6 % (ref 0–13.4)
NEUTROPHILS # BLD AUTO: 4.6 K/UL (ref 1.82–7.42)
NEUTROPHILS NFR BLD: 67.6 % (ref 44–72)
NRBC # BLD AUTO: 0 K/UL
NRBC BLD-RTO: 0 /100 WBC (ref 0–0.2)
PLATELET # BLD AUTO: 265 K/UL (ref 164–446)
PMV BLD AUTO: 9.4 FL (ref 9–12.9)
POTASSIUM SERPL-SCNC: 4.1 MMOL/L (ref 3.6–5.5)
PROT SERPL-MCNC: 6.8 G/DL (ref 6–8.2)
RBC # BLD AUTO: 4.2 M/UL (ref 4.2–5.4)
SODIUM SERPL-SCNC: 137 MMOL/L (ref 135–145)
WBC # BLD AUTO: 6.8 K/UL (ref 4.8–10.8)

## 2025-02-09 PROCEDURE — 36415 COLL VENOUS BLD VENIPUNCTURE: CPT

## 2025-02-09 PROCEDURE — 700111 HCHG RX REV CODE 636 W/ 250 OVERRIDE (IP): Mod: UD | Performed by: EMERGENCY MEDICINE

## 2025-02-09 PROCEDURE — 87899 AGENT NOS ASSAY W/OPTIC: CPT

## 2025-02-09 PROCEDURE — 96374 THER/PROPH/DIAG INJ IV PUSH: CPT

## 2025-02-09 PROCEDURE — 85025 COMPLETE CBC W/AUTO DIFF WBC: CPT

## 2025-02-09 PROCEDURE — 74177 CT ABD & PELVIS W/CONTRAST: CPT

## 2025-02-09 PROCEDURE — 700102 HCHG RX REV CODE 250 W/ 637 OVERRIDE(OP): Performed by: HOSPITALIST

## 2025-02-09 PROCEDURE — 700117 HCHG RX CONTRAST REV CODE 255: Mod: UD | Performed by: EMERGENCY MEDICINE

## 2025-02-09 PROCEDURE — A9270 NON-COVERED ITEM OR SERVICE: HCPCS | Performed by: HOSPITALIST

## 2025-02-09 PROCEDURE — 700105 HCHG RX REV CODE 258: Performed by: HOSPITALIST

## 2025-02-09 PROCEDURE — 99285 EMERGENCY DEPT VISIT HI MDM: CPT

## 2025-02-09 PROCEDURE — 87046 STOOL CULTR AEROBIC BACT EA: CPT

## 2025-02-09 PROCEDURE — 80053 COMPREHEN METABOLIC PANEL: CPT

## 2025-02-09 PROCEDURE — 96375 TX/PRO/DX INJ NEW DRUG ADDON: CPT

## 2025-02-09 PROCEDURE — 770006 HCHG ROOM/CARE - MED/SURG/GYN SEMI*

## 2025-02-09 PROCEDURE — 87045 FECES CULTURE AEROBIC BACT: CPT

## 2025-02-09 PROCEDURE — 700105 HCHG RX REV CODE 258: Mod: UD | Performed by: EMERGENCY MEDICINE

## 2025-02-09 PROCEDURE — 99223 1ST HOSP IP/OBS HIGH 75: CPT | Performed by: HOSPITALIST

## 2025-02-09 PROCEDURE — 84703 CHORIONIC GONADOTROPIN ASSAY: CPT

## 2025-02-09 PROCEDURE — 83690 ASSAY OF LIPASE: CPT

## 2025-02-09 RX ORDER — SODIUM CHLORIDE, SODIUM LACTATE, POTASSIUM CHLORIDE, CALCIUM CHLORIDE 600; 310; 30; 20 MG/100ML; MG/100ML; MG/100ML; MG/100ML
INJECTION, SOLUTION INTRAVENOUS CONTINUOUS
Status: DISCONTINUED | OUTPATIENT
Start: 2025-02-09 | End: 2025-02-11

## 2025-02-09 RX ORDER — ONDANSETRON 4 MG/1
4 TABLET, ORALLY DISINTEGRATING ORAL EVERY 4 HOURS PRN
Status: DISCONTINUED | OUTPATIENT
Start: 2025-02-09 | End: 2025-02-11 | Stop reason: HOSPADM

## 2025-02-09 RX ORDER — ACETAMINOPHEN 325 MG/1
650 TABLET ORAL EVERY 6 HOURS PRN
Status: DISCONTINUED | OUTPATIENT
Start: 2025-02-09 | End: 2025-02-11 | Stop reason: HOSPADM

## 2025-02-09 RX ORDER — OXYCODONE HYDROCHLORIDE 5 MG/1
5 TABLET ORAL EVERY 4 HOURS PRN
Status: DISCONTINUED | OUTPATIENT
Start: 2025-02-09 | End: 2025-02-11 | Stop reason: HOSPADM

## 2025-02-09 RX ORDER — MORPHINE SULFATE 4 MG/ML
1 INJECTION INTRAVENOUS EVERY 4 HOURS PRN
Status: DISCONTINUED | OUTPATIENT
Start: 2025-02-09 | End: 2025-02-11

## 2025-02-09 RX ORDER — ONDANSETRON 2 MG/ML
4 INJECTION INTRAMUSCULAR; INTRAVENOUS EVERY 4 HOURS PRN
Status: DISCONTINUED | OUTPATIENT
Start: 2025-02-09 | End: 2025-02-11 | Stop reason: HOSPADM

## 2025-02-09 RX ORDER — SODIUM CHLORIDE, SODIUM LACTATE, POTASSIUM CHLORIDE, CALCIUM CHLORIDE 600; 310; 30; 20 MG/100ML; MG/100ML; MG/100ML; MG/100ML
1000 INJECTION, SOLUTION INTRAVENOUS ONCE
Status: COMPLETED | OUTPATIENT
Start: 2025-02-09 | End: 2025-02-09

## 2025-02-09 RX ORDER — IBUPROFEN 200 MG
400-800 TABLET ORAL EVERY 8 HOURS PRN
Status: ON HOLD | COMMUNITY
End: 2025-02-11

## 2025-02-09 RX ORDER — SUCRALFATE ORAL 1 G/10ML
1 SUSPENSION ORAL EVERY 6 HOURS
Status: DISCONTINUED | OUTPATIENT
Start: 2025-02-09 | End: 2025-02-11 | Stop reason: HOSPADM

## 2025-02-09 RX ORDER — PROMETHAZINE HYDROCHLORIDE 25 MG/1
12.5-25 TABLET ORAL EVERY 4 HOURS PRN
Status: DISCONTINUED | OUTPATIENT
Start: 2025-02-09 | End: 2025-02-11 | Stop reason: HOSPADM

## 2025-02-09 RX ORDER — PANTOPRAZOLE SODIUM 40 MG/10ML
40 INJECTION, POWDER, LYOPHILIZED, FOR SOLUTION INTRAVENOUS ONCE
Status: COMPLETED | OUTPATIENT
Start: 2025-02-09 | End: 2025-02-09

## 2025-02-09 RX ORDER — ONDANSETRON 2 MG/ML
4 INJECTION INTRAMUSCULAR; INTRAVENOUS ONCE
Status: COMPLETED | OUTPATIENT
Start: 2025-02-09 | End: 2025-02-09

## 2025-02-09 RX ORDER — PROCHLORPERAZINE EDISYLATE 5 MG/ML
5-10 INJECTION INTRAMUSCULAR; INTRAVENOUS EVERY 4 HOURS PRN
Status: DISCONTINUED | OUTPATIENT
Start: 2025-02-09 | End: 2025-02-11 | Stop reason: HOSPADM

## 2025-02-09 RX ORDER — OMEPRAZOLE 20 MG/1
20 CAPSULE, DELAYED RELEASE ORAL 2 TIMES DAILY
Status: DISCONTINUED | OUTPATIENT
Start: 2025-02-09 | End: 2025-02-11 | Stop reason: HOSPADM

## 2025-02-09 RX ORDER — PROMETHAZINE HYDROCHLORIDE 25 MG/1
12.5-25 SUPPOSITORY RECTAL EVERY 4 HOURS PRN
Status: DISCONTINUED | OUTPATIENT
Start: 2025-02-09 | End: 2025-02-11 | Stop reason: HOSPADM

## 2025-02-09 RX ORDER — MORPHINE SULFATE 4 MG/ML
4 INJECTION INTRAVENOUS ONCE
Status: COMPLETED | OUTPATIENT
Start: 2025-02-09 | End: 2025-02-09

## 2025-02-09 RX ADMIN — MORPHINE SULFATE 4 MG: 4 INJECTION, SOLUTION INTRAMUSCULAR; INTRAVENOUS at 15:13

## 2025-02-09 RX ADMIN — SUCRALFATE ORAL SUSPENSION 1 G: 1 SUSPENSION ORAL at 19:48

## 2025-02-09 RX ADMIN — SODIUM CHLORIDE, POTASSIUM CHLORIDE, SODIUM LACTATE AND CALCIUM CHLORIDE 1000 ML: 600; 310; 30; 20 INJECTION, SOLUTION INTRAVENOUS at 15:13

## 2025-02-09 RX ADMIN — SODIUM CHLORIDE, POTASSIUM CHLORIDE, SODIUM LACTATE AND CALCIUM CHLORIDE: 600; 310; 30; 20 INJECTION, SOLUTION INTRAVENOUS at 18:26

## 2025-02-09 RX ADMIN — OXYCODONE 5 MG: 5 TABLET ORAL at 19:49

## 2025-02-09 RX ADMIN — OMEPRAZOLE 20 MG: 20 CAPSULE, DELAYED RELEASE ORAL at 19:48

## 2025-02-09 RX ADMIN — IOHEXOL 96 ML: 350 INJECTION, SOLUTION INTRAVENOUS at 16:30

## 2025-02-09 RX ADMIN — ONDANSETRON 4 MG: 2 INJECTION INTRAMUSCULAR; INTRAVENOUS at 15:14

## 2025-02-09 RX ADMIN — PANTOPRAZOLE SODIUM 40 MG: 40 INJECTION, POWDER, FOR SOLUTION INTRAVENOUS at 16:55

## 2025-02-09 SDOH — ECONOMIC STABILITY: TRANSPORTATION INSECURITY
IN THE PAST 12 MONTHS, HAS LACK OF RELIABLE TRANSPORTATION KEPT YOU FROM MEDICAL APPOINTMENTS, MEETINGS, WORK OR FROM GETTING THINGS NEEDED FOR DAILY LIVING?: NO

## 2025-02-09 SDOH — ECONOMIC STABILITY: TRANSPORTATION INSECURITY
IN THE PAST 12 MONTHS, HAS THE LACK OF TRANSPORTATION KEPT YOU FROM MEDICAL APPOINTMENTS OR FROM GETTING MEDICATIONS?: NO

## 2025-02-09 ASSESSMENT — LIFESTYLE VARIABLES
DO YOU DRINK ALCOHOL: NO
EVER HAD A DRINK FIRST THING IN THE MORNING TO STEADY YOUR NERVES TO GET RID OF A HANGOVER: NO
HAVE YOU EVER FELT YOU SHOULD CUT DOWN ON YOUR DRINKING: NO
TOTAL SCORE: 0
ALCOHOL_USE: NO
TOTAL SCORE: 0
ON A TYPICAL DAY WHEN YOU DRINK ALCOHOL HOW MANY DRINKS DO YOU HAVE: 0
EVER FELT BAD OR GUILTY ABOUT YOUR DRINKING: NO
SUBSTANCE_ABUSE: 0
CONSUMPTION TOTAL: NEGATIVE
AVERAGE NUMBER OF DAYS PER WEEK YOU HAVE A DRINK CONTAINING ALCOHOL: 0
HOW MANY TIMES IN THE PAST YEAR HAVE YOU HAD 5 OR MORE DRINKS IN A DAY: 0
TOTAL SCORE: 0
HAVE PEOPLE ANNOYED YOU BY CRITICIZING YOUR DRINKING: NO

## 2025-02-09 ASSESSMENT — ENCOUNTER SYMPTOMS
PHOTOPHOBIA: 0
SHORTNESS OF BREATH: 0
EYE PAIN: 0
HEARTBURN: 1
POLYDIPSIA: 0
SPUTUM PRODUCTION: 0
FALLS: 0
DIZZINESS: 0
BRUISES/BLEEDS EASILY: 0
DOUBLE VISION: 0
STRIDOR: 0
HALLUCINATIONS: 0
MYALGIAS: 0
BLURRED VISION: 0
ABDOMINAL PAIN: 1
PALPITATIONS: 0
HEADACHES: 0
FLANK PAIN: 0
NAUSEA: 1
NECK PAIN: 0
CLAUDICATION: 0
HEMOPTYSIS: 0
WEAKNESS: 0
CHILLS: 0
ORTHOPNEA: 0
SINUS PAIN: 0
PND: 0
SORE THROAT: 0
COUGH: 0
TINGLING: 0
WHEEZING: 0
BACK PAIN: 0
DIAPHORESIS: 0
BLOOD IN STOOL: 0
CONSTIPATION: 0
TREMORS: 0
DIARRHEA: 1
VOMITING: 1
FEVER: 0
DEPRESSION: 0

## 2025-02-09 ASSESSMENT — PATIENT HEALTH QUESTIONNAIRE - PHQ9
3. TROUBLE FALLING OR STAYING ASLEEP OR SLEEPING TOO MUCH: SEVERAL DAYS
SUM OF ALL RESPONSES TO PHQ9 QUESTIONS 1 AND 2: 1
SUM OF ALL RESPONSES TO PHQ QUESTIONS 1-9: 4
1. LITTLE INTEREST OR PLEASURE IN DOING THINGS: SEVERAL DAYS
7. TROUBLE CONCENTRATING ON THINGS, SUCH AS READING THE NEWSPAPER OR WATCHING TELEVISION: NOT AT ALL
4. FEELING TIRED OR HAVING LITTLE ENERGY: SEVERAL DAYS
9. THOUGHTS THAT YOU WOULD BE BETTER OFF DEAD, OR OF HURTING YOURSELF: NOT AT ALL
5. POOR APPETITE OR OVEREATING: SEVERAL DAYS
6. FEELING BAD ABOUT YOURSELF - OR THAT YOU ARE A FAILURE OR HAVE LET YOURSELF OR YOUR FAMILY DOWN: NOT AL ALL
2. FEELING DOWN, DEPRESSED, IRRITABLE, OR HOPELESS: NOT AT ALL
8. MOVING OR SPEAKING SO SLOWLY THAT OTHER PEOPLE COULD HAVE NOTICED. OR THE OPPOSITE, BEING SO FIGETY OR RESTLESS THAT YOU HAVE BEEN MOVING AROUND A LOT MORE THAN USUAL: NOT AT ALL

## 2025-02-09 ASSESSMENT — SOCIAL DETERMINANTS OF HEALTH (SDOH)
WITHIN THE LAST YEAR, HAVE YOU BEEN KICKED, HIT, SLAPPED, OR OTHERWISE PHYSICALLY HURT BY YOUR PARTNER OR EX-PARTNER?: NO
WITHIN THE PAST 12 MONTHS, THE FOOD YOU BOUGHT JUST DIDN'T LAST AND YOU DIDN'T HAVE MONEY TO GET MORE: NEVER TRUE
WITHIN THE LAST YEAR, HAVE YOU BEEN HUMILIATED OR EMOTIONALLY ABUSED IN OTHER WAYS BY YOUR PARTNER OR EX-PARTNER?: NO
WITHIN THE LAST YEAR, HAVE TO BEEN RAPED OR FORCED TO HAVE ANY KIND OF SEXUAL ACTIVITY BY YOUR PARTNER OR EX-PARTNER?: NO
WITHIN THE LAST YEAR, HAVE YOU BEEN AFRAID OF YOUR PARTNER OR EX-PARTNER?: NO
IN THE PAST 12 MONTHS, HAS THE ELECTRIC, GAS, OIL, OR WATER COMPANY THREATENED TO SHUT OFF SERVICE IN YOUR HOME?: NO
WITHIN THE PAST 12 MONTHS, YOU WORRIED THAT YOUR FOOD WOULD RUN OUT BEFORE YOU GOT THE MONEY TO BUY MORE: NEVER TRUE

## 2025-02-09 ASSESSMENT — COGNITIVE AND FUNCTIONAL STATUS - GENERAL
SUGGESTED CMS G CODE MODIFIER MOBILITY: CH
SUGGESTED CMS G CODE MODIFIER DAILY ACTIVITY: CH
MOBILITY SCORE: 24
DAILY ACTIVITIY SCORE: 24

## 2025-02-09 ASSESSMENT — PAIN DESCRIPTION - PAIN TYPE
TYPE: ACUTE PAIN

## 2025-02-09 ASSESSMENT — FIBROSIS 4 INDEX: FIB4 SCORE: 1.33

## 2025-02-09 NOTE — ED PROVIDER NOTES
ED Provider Note    CHIEF COMPLAINT  Chief Complaint   Patient presents with    Abdominal Pain     Pt reports LUQ pain x 3 weeks, seen at  yesterday and was told to come to ER for assessment. +intermittent diarrhea, +n/v with severe pain       EXTERNAL RECORDS REVIEWED  Outpatient Notes patient was seen at [facility] urgent care yesterday for epigastric abdominal pain that she has had for last 2 weeks with stomach pain diarrhea and pain with movement she has a history of gastric bypass surgery 20 years ago they recommend that she come to the emergency department for higher level of care and further evaluation    HPI/ROS  LIMITATION TO HISTORY   Select: : None  OUTSIDE HISTORIAN(S):   none    Madhavi Coffman is a 59 y.o. female who presents planing of intermittent left upper quadrant pain for the last 3 weeks she has a history of a gastric bypass 20 years ago with Dr. Chester.  She also has GERD with esophagitis.  She is have hypertension but has been taking medication for that and is controlling her blood pressure well.  She has intermittent diarrhea.  She has no nausea or vomiting.  She admits to 1 shot nightly of alcohol for the last few months.  She denies any dark tarry stools or blood in her stool.  She denies any fevers or chills.  She states that when she has the pain it is a severe pressure pain in her left upper quadrant that goes into her back.  Currently her pain is about 6 out of 10.    PAST MEDICAL HISTORY   has a past medical history of Anxiety, Arthritis, DDD (degenerative disc disease), lumbar (02/07/2024), Depression, Diverticulosis, GERD with esophagitis, Hiatus hernia syndrome, Hyperlipidemia LDL goal <100 (02/07/2024), LBP (low back pain) (09/07/2012), Migraine without aura, with intractable migraine, so stated, without mention of status migrainosus, Obesity, Peripheral neuropathy, Primary hypertension (02/07/2024), Thoracic or lumbosacral neuritis or radiculitis, unspecified, and Vitamin D  deficiency (2024).    SURGICAL HISTORY   has a past surgical history that includes sigmoid colectomy (); gastric bypass laparoscopic; removal of ovary/tube(s) (); tonsillectomy (); cholecystectomy; other (); fusion, spine, lumbar, plif (2009); lumbar decompression (2009); gastric bypass laparoscopic (2010); hiatal hernia repair (2010); s i joint fusion (2010); breast reduction; fusion, spine, lumbar, plif (2011); lumbar decompression (2011); hardware removal ortho (2011); percut implnt neuroelect,epidural (2012); percut implnt neuroelect,epidural (2012); inj,sacroiliac,anes/steroid (2012); inj,sacroiliac,anes/steroid (2012); thoracic laminectomy (2012); spinal cord stimulator (2012); inj,sacroiliac,anes/steroid (2012); inj,sacroiliac,anes/steroid (2012); neuro dest facet l/s w/ig sngl (3/29/2013); neuro dest facet l/s w/ig addl (3/29/2013); neuro dest facet l/s w/ig addl (3/29/2013); neuro dest facet l/s w/ig addl (3/29/2013); neuro dest facet l/s w/ig sngl (2013); neuro dest facet l/s w/ig addl (2013); neuro dest facet l/s w/ig addl (2013); inj,sacroiliac,anes/steroid (2013); inj,sacroiliac,anes/steroid (2013); and inj,sacroiliac,anes/steroid (10/18/2013).    FAMILY HISTORY  Family History   Problem Relation Age of Onset    Cancer Father         ? type of cancer    Diabetes Father     Hypertension Father     Other Father         Migraine    Cancer Maternal Aunt         breast    Heart Disease Maternal Grandmother     Dementia Maternal Grandmother     Heart Disease Maternal Grandfather     Alcohol/Drug Paternal Grandfather        SOCIAL HISTORY  Social History     Tobacco Use    Smoking status: Former     Current packs/day: 0.00     Types: Cigarettes     Quit date: 2024     Years since quittin.1    Smokeless tobacco: Never    Tobacco comments:     .  Quit smoking 2022  "  Vaping Use    Vaping status: Never Used   Substance and Sexual Activity    Alcohol use: Yes     Alcohol/week: 0.5 oz     Types: 1 Standard drinks or equivalent per week     Comment: 2 per week    Drug use: No    Sexual activity: Yes     Partners: Male       CURRENT MEDICATIONS  Home Medications       Reviewed by Michaela Christianson (Pharmacy Tech) on 02/09/25 at 1656  Med List Status: Complete     Medication Last Dose Status   hydroCHLOROthiazide 25 MG Tab 2/9/2025 Active   ibuprofen (MOTRIN) 200 MG Tab 2/9/2025 Active   lisinopril (PRINIVIL) 20 MG Tab 2/9/2025 Active                  Audit from Redirected Encounters    **Home medications have not yet been reviewed for this encounter**         ALLERGIES  Allergies   Allergen Reactions    Neosporin [Neomycin-Bacitracin-Polymyxin] Vomiting and Swelling    Sudafed [Pseudoephedrine Hcl] Rash          Cleocin [Clindamycin Hcl]      Stomach upset    Tape Contact Dermatitis       PHYSICAL EXAM  VITAL SIGNS: /61   Pulse (!) 104   Temp 36.6 °C (97.8 °F) (Temporal)   Resp 16   Ht 1.651 m (5' 5\")   Wt 84 kg (185 lb 3 oz)   LMP 08/03/1999   SpO2 99%   BMI 30.82 kg/m²      Constitutional: Well developed, Well nourished, No acute distress, Non-toxic appearance.   HEENT: Normocephalic, Atraumatic,  external ears normal, pharynx pink,  Mucous  Membranes moist, No rhinorrhea or mucosal edema  Eyes: PERRL, EOMI, Conjunctiva normal, No discharge.   Neck: Normal range of motion, No tenderness, Supple, No stridor.   Lymphatic: No lymphadenopathy    Cardiovascular: Regular Rate and Rhythm, No murmurs,  rubs, or gallops.   Thorax & Lungs: Lungs clear to auscultation bilaterally, No respiratory distress, No wheezes, rhales or rhonchi, No chest wall tenderness.   Abdomen: Bowel sounds normal, Soft, positive left upper quadrant tenderness to palpation non distended,  No pulsatile masses., no rebound guarding or peritoneal signs.   Skin: Warm, Dry, No erythema, No rash, "   Back:  No CVA tenderness,  No spinal tenderness, bony crepitance step offs or instability.   Extremities: Equal, intact distal pulses, No cyanosis, clubbing or edema,  No tenderness.   Musculoskeletal: Good range of motion in all major joints. No tenderness to palpation or major deformities noted.   Neurologic: Alert & oriented No focal deficits noted.  Psychiatric: Affect normal, Judgment normal, Mood normal.      EKG/LABS  Results for orders placed or performed during the hospital encounter of 02/09/25   CBC WITH DIFFERENTIAL    Collection Time: 02/09/25  2:55 PM   Result Value Ref Range    WBC 6.8 4.8 - 10.8 K/uL    RBC 4.20 4.20 - 5.40 M/uL    Hemoglobin 10.7 (L) 12.0 - 16.0 g/dL    Hematocrit 34.9 (L) 37.0 - 47.0 %    MCV 83.1 81.4 - 97.8 fL    MCH 25.5 (L) 27.0 - 33.0 pg    MCHC 30.7 (L) 32.2 - 35.5 g/dL    RDW 53.1 (H) 35.9 - 50.0 fL    Platelet Count 265 164 - 446 K/uL    MPV 9.4 9.0 - 12.9 fL    Neutrophils-Polys 67.60 44.00 - 72.00 %    Lymphocytes 23.60 22.00 - 41.00 %    Monocytes 6.60 0.00 - 13.40 %    Eosinophils 1.60 0.00 - 6.90 %    Basophils 0.30 0.00 - 1.80 %    Immature Granulocytes 0.30 0.00 - 0.90 %    Nucleated RBC 0.00 0.00 - 0.20 /100 WBC    Neutrophils (Absolute) 4.60 1.82 - 7.42 K/uL    Lymphs (Absolute) 1.61 1.00 - 4.80 K/uL    Monos (Absolute) 0.45 0.00 - 0.85 K/uL    Eos (Absolute) 0.11 0.00 - 0.51 K/uL    Baso (Absolute) 0.02 0.00 - 0.12 K/uL    Immature Granulocytes (abs) 0.02 0.00 - 0.11 K/uL    NRBC (Absolute) 0.00 K/uL   COMP METABOLIC PANEL    Collection Time: 02/09/25  2:55 PM   Result Value Ref Range    Sodium 137 135 - 145 mmol/L    Potassium 4.1 3.6 - 5.5 mmol/L    Chloride 99 96 - 112 mmol/L    Co2 24 20 - 33 mmol/L    Anion Gap 14.0 7.0 - 16.0    Glucose 101 (H) 65 - 99 mg/dL    Bun 30 (H) 8 - 22 mg/dL    Creatinine 2.04 (H) 0.50 - 1.40 mg/dL    Calcium 9.6 8.5 - 10.5 mg/dL    Correct Calcium 9.4 8.5 - 10.5 mg/dL    AST(SGOT) 21 12 - 45 U/L    ALT(SGPT) 10 2 - 50 U/L     Alkaline Phosphatase 167 (H) 30 - 99 U/L    Total Bilirubin 0.8 0.1 - 1.5 mg/dL    Albumin 4.3 3.2 - 4.9 g/dL    Total Protein 6.8 6.0 - 8.2 g/dL    Globulin 2.5 1.9 - 3.5 g/dL    A-G Ratio 1.7 g/dL   HCG QUAL SERUM    Collection Time: 02/09/25  2:55 PM   Result Value Ref Range    Beta-Hcg Qualitative Serum Negative Negative   LIPASE    Collection Time: 02/09/25  2:55 PM   Result Value Ref Range    Lipase 28 11 - 82 U/L   ESTIMATED GFR    Collection Time: 02/09/25  2:55 PM   Result Value Ref Range    GFR (CKD-EPI) 27 (A) >60 mL/min/1.73 m 2       I have independently interpreted this EKG    RADIOLOGY/PROCEDURES   I have independently interpreted the diagnostic imaging associated with this visit and am waiting the final reading from the radiologist.   My preliminary interpretation is as follows:  Ct abd/pelvis no obstruction    Radiologist interpretation:  CT-ABDOMEN-PELVIS WITH   Final Result      1.  Postoperative changes status post gastric bypass surgery. There is a probable small ulcer in the gastric antrum.   2.  Mild biliary dilatation in this postcholecystectomy patient.   3.  No acute inflammatory abnormality is seen.          COURSE & MEDICAL DECISION MAKING    ASSESSMENT, COURSE AND PLAN  Care Narrative: Is a 59-year-old female status post gastric bypass 20 years ago with intermittent severe left upper quadrant abdominal pain and diarrhea.  She comes in today with severe pain.  She denies any fevers or chills chest pain or shortness of breath nausea or vomiting.  She denies any black tarry stools or blood in her stool she denies any dysuria frequency or urgency.    Hydration: Based on the patient's presentation of Abnormal Fluid Loss the patient was given IV fluids. IV Hydration was used because oral hydration was not adequate alone. Upon recheck following hydration, the patient was improved.          ADDITIONAL PROBLEMS MANAGED      DISPOSITION AND DISCUSSIONS    An IV was started and labs are drawn to  give the patient morphine and Zofran for pain and nausea and ordered a CT abdomen pelvis for further evaluation of her symptoms.  Patient's white count is normal at 6.8 hemoglobin 10.7 which is her baseline platelet count 265 with a normal differential.  Comprehensive metabolic panel has a BUN of 30 creatinine 2.04 which is elevated for her alk phos is elevated at 167 electrolytes are normal liver function tests are normal lipase is normal at 28.  Pregnancy test is negative.    Patient CT abdomen/pelvis shows postoperative changes status post gastric bypass surgery there is a probable small ulcer in the gastric antrum mild biliary dilatation postcholecystectomy no acute inflammatory abnormality seen.    I gave the patient IV Protonix.  She will require admission to the hospital for further evaluation of her renal function and possible GI evaluation for her gastric antral ulcer.  Patient understands and will be admitted for further care.  Her pain is under better control after the morphine.    I spoke with the hospitalist who accepts her for admission.        I have discussed management of the patient with the following physicians and ANT's:  hospitalist dr George      Discussion of management with other Q or appropriate source(s): None     Escalation of care considered, and ultimately not performed: none    Barriers to care at this time, including but not limited to: none.     Decision tools and prescription drugs considered including, but not limited to: Pain Medications morphine .  Pt will be admitted in guarded condition  FINAL DIAGNOSIS  1. Left upper quadrant abdominal pain    2. YADIRA (acute kidney injury) (HCC)    3. PUD (peptic ulcer disease)         Electronically signed by: Demetra Escobedo M.D., 2/9/2025 2:58 PM

## 2025-02-09 NOTE — ED NOTES
Pt ambulated to [facility], agree with triage note.  Pt changed into gown and placed on monitors.  IV access obtained.  Blood drawn and sent to lab.  ERP to see.

## 2025-02-09 NOTE — ED TRIAGE NOTES
Chief Complaint   Patient presents with    Abdominal Pain     Pt reports LUQ pain x 3 weeks, seen at [facility] yesterday and was told to come to ER for assessment. +intermittent diarrhea, +n/v with severe pain       Pt to triage with steady gait for above complaint. Presents with pain to LUQ x 3 weeks. Pt states UC was concerned about her pancreas and spleen and advised pt to come to ED    Pt back to [facility]ell, educated on triage process and encourage to alert staff of any changes.     Vitals:    02/09/25 1424   BP: 116/61   Pulse: (!) 104   Resp: 16   Temp: 36.6 °C (97.8 °F)   SpO2: 99%

## 2025-02-10 ENCOUNTER — ANESTHESIA EVENT (OUTPATIENT)
Dept: SURGERY | Facility: MEDICAL CENTER | Age: 60
DRG: 684 | End: 2025-02-10
Payer: MEDICAID

## 2025-02-10 ENCOUNTER — ANESTHESIA (OUTPATIENT)
Dept: SURGERY | Facility: MEDICAL CENTER | Age: 60
DRG: 684 | End: 2025-02-10
Payer: MEDICAID

## 2025-02-10 PROBLEM — Z98.84 STATUS POST GASTRIC BYPASS FOR OBESITY: Status: ACTIVE | Noted: 2025-02-10

## 2025-02-10 LAB
ALBUMIN SERPL BCP-MCNC: 3.3 G/DL (ref 3.2–4.9)
ALBUMIN/GLOB SERPL: 1.8 G/DL
ALP SERPL-CCNC: 125 U/L (ref 30–99)
ALT SERPL-CCNC: 6 U/L (ref 2–50)
ANION GAP SERPL CALC-SCNC: 9 MMOL/L (ref 7–16)
ANISOCYTOSIS BLD QL SMEAR: ABNORMAL
AST SERPL-CCNC: 16 U/L (ref 12–45)
BASOPHILS # BLD AUTO: 0.4 % (ref 0–1.8)
BASOPHILS # BLD: 0.02 K/UL (ref 0–0.12)
BILIRUB SERPL-MCNC: 0.5 MG/DL (ref 0.1–1.5)
BUN SERPL-MCNC: 25 MG/DL (ref 8–22)
CALCIUM ALBUM COR SERPL-MCNC: 9.4 MG/DL (ref 8.5–10.5)
CALCIUM SERPL-MCNC: 8.8 MG/DL (ref 8.5–10.5)
CHLORIDE SERPL-SCNC: 106 MMOL/L (ref 96–112)
CO2 SERPL-SCNC: 23 MMOL/L (ref 20–33)
COMMENT 1642: NORMAL
CREAT SERPL-MCNC: 1.55 MG/DL (ref 0.5–1.4)
E COLI SXT1+2 STL IA: NORMAL
EOSINOPHIL # BLD AUTO: 0.14 K/UL (ref 0–0.51)
EOSINOPHIL NFR BLD: 3.1 % (ref 0–6.9)
ERYTHROCYTE [DISTWIDTH] IN BLOOD BY AUTOMATED COUNT: 56.7 FL (ref 35.9–50)
FERRITIN SERPL-MCNC: 24 NG/ML (ref 10–291)
GFR SERPLBLD CREATININE-BSD FMLA CKD-EPI: 38 ML/MIN/1.73 M 2
GLOBULIN SER CALC-MCNC: 1.8 G/DL (ref 1.9–3.5)
GLUCOSE SERPL-MCNC: 91 MG/DL (ref 65–99)
HCT VFR BLD AUTO: 30.2 % (ref 37–47)
HGB BLD-MCNC: 8.6 G/DL (ref 12–16)
HYPOCHROMIA BLD QL SMEAR: ABNORMAL
IMM GRANULOCYTES # BLD AUTO: 0.02 K/UL (ref 0–0.11)
IMM GRANULOCYTES NFR BLD AUTO: 0.4 % (ref 0–0.9)
IRON SATN MFR SERPL: 9 % (ref 15–55)
IRON SERPL-MCNC: 36 UG/DL (ref 40–170)
LYMPHOCYTES # BLD AUTO: 1.86 K/UL (ref 1–4.8)
LYMPHOCYTES NFR BLD: 40.9 % (ref 22–41)
MCH RBC QN AUTO: 24.7 PG (ref 27–33)
MCHC RBC AUTO-ENTMCNC: 28.5 G/DL (ref 32.2–35.5)
MCV RBC AUTO: 86.8 FL (ref 81.4–97.8)
MICROCYTES BLD QL SMEAR: ABNORMAL
MONOCYTES # BLD AUTO: 0.35 K/UL (ref 0–0.85)
MONOCYTES NFR BLD AUTO: 7.7 % (ref 0–13.4)
MORPHOLOGY BLD-IMP: NORMAL
NEUTROPHILS # BLD AUTO: 2.16 K/UL (ref 1.82–7.42)
NEUTROPHILS NFR BLD: 47.5 % (ref 44–72)
NRBC # BLD AUTO: 0 K/UL
NRBC BLD-RTO: 0 /100 WBC (ref 0–0.2)
PLATELET # BLD AUTO: 218 K/UL (ref 164–446)
PLATELET BLD QL SMEAR: NORMAL
PMV BLD AUTO: 9.6 FL (ref 9–12.9)
POTASSIUM SERPL-SCNC: 4.2 MMOL/L (ref 3.6–5.5)
PROT SERPL-MCNC: 5.1 G/DL (ref 6–8.2)
RBC # BLD AUTO: 3.48 M/UL (ref 4.2–5.4)
RBC BLD AUTO: PRESENT
SIGNIFICANT IND 70042: NORMAL
SITE SITE: NORMAL
SODIUM SERPL-SCNC: 138 MMOL/L (ref 135–145)
SOURCE SOURCE: NORMAL
TIBC SERPL-MCNC: 382 UG/DL (ref 250–450)
UIBC SERPL-MCNC: 346 UG/DL (ref 110–370)
WBC # BLD AUTO: 4.6 K/UL (ref 4.8–10.8)

## 2025-02-10 PROCEDURE — 700105 HCHG RX REV CODE 258: Performed by: HOSPITALIST

## 2025-02-10 PROCEDURE — 700111 HCHG RX REV CODE 636 W/ 250 OVERRIDE (IP): Mod: JZ | Performed by: STUDENT IN AN ORGANIZED HEALTH CARE EDUCATION/TRAINING PROGRAM

## 2025-02-10 PROCEDURE — 99233 SBSQ HOSP IP/OBS HIGH 50: CPT | Performed by: STUDENT IN AN ORGANIZED HEALTH CARE EDUCATION/TRAINING PROGRAM

## 2025-02-10 PROCEDURE — 83540 ASSAY OF IRON: CPT

## 2025-02-10 PROCEDURE — 700102 HCHG RX REV CODE 250 W/ 637 OVERRIDE(OP): Performed by: STUDENT IN AN ORGANIZED HEALTH CARE EDUCATION/TRAINING PROGRAM

## 2025-02-10 PROCEDURE — 80053 COMPREHEN METABOLIC PANEL: CPT

## 2025-02-10 PROCEDURE — 82728 ASSAY OF FERRITIN: CPT

## 2025-02-10 PROCEDURE — 85025 COMPLETE CBC W/AUTO DIFF WBC: CPT

## 2025-02-10 PROCEDURE — 160002 HCHG RECOVERY MINUTES (STAT): Performed by: INTERNAL MEDICINE

## 2025-02-10 PROCEDURE — 700101 HCHG RX REV CODE 250: Performed by: STUDENT IN AN ORGANIZED HEALTH CARE EDUCATION/TRAINING PROGRAM

## 2025-02-10 PROCEDURE — 160048 HCHG OR STATISTICAL LEVEL 1-5: Performed by: INTERNAL MEDICINE

## 2025-02-10 PROCEDURE — 43249 ESOPH EGD DILATION <30 MM: CPT | Performed by: INTERNAL MEDICINE

## 2025-02-10 PROCEDURE — A9270 NON-COVERED ITEM OR SERVICE: HCPCS | Performed by: HOSPITALIST

## 2025-02-10 PROCEDURE — 700102 HCHG RX REV CODE 250 W/ 637 OVERRIDE(OP): Performed by: HOSPITALIST

## 2025-02-10 PROCEDURE — 700111 HCHG RX REV CODE 636 W/ 250 OVERRIDE (IP): Mod: JZ | Performed by: HOSPITALIST

## 2025-02-10 PROCEDURE — 160203 HCHG ENDO MINUTES - 1ST 30 MINS LEVEL 4: Performed by: INTERNAL MEDICINE

## 2025-02-10 PROCEDURE — 99254 IP/OBS CNSLTJ NEW/EST MOD 60: CPT | Mod: 25 | Performed by: INTERNAL MEDICINE

## 2025-02-10 PROCEDURE — C1726 CATH, BAL DIL, NON-VASCULAR: HCPCS | Performed by: INTERNAL MEDICINE

## 2025-02-10 PROCEDURE — 770006 HCHG ROOM/CARE - MED/SURG/GYN SEMI*

## 2025-02-10 PROCEDURE — A9270 NON-COVERED ITEM OR SERVICE: HCPCS | Performed by: STUDENT IN AN ORGANIZED HEALTH CARE EDUCATION/TRAINING PROGRAM

## 2025-02-10 PROCEDURE — 700105 HCHG RX REV CODE 258: Performed by: STUDENT IN AN ORGANIZED HEALTH CARE EDUCATION/TRAINING PROGRAM

## 2025-02-10 PROCEDURE — 0DJ08ZZ INSPECTION OF UPPER INTESTINAL TRACT, VIA NATURAL OR ARTIFICIAL OPENING ENDOSCOPIC: ICD-10-PCS | Performed by: INTERNAL MEDICINE

## 2025-02-10 PROCEDURE — 700111 HCHG RX REV CODE 636 W/ 250 OVERRIDE (IP): Performed by: STUDENT IN AN ORGANIZED HEALTH CARE EDUCATION/TRAINING PROGRAM

## 2025-02-10 PROCEDURE — 83550 IRON BINDING TEST: CPT

## 2025-02-10 PROCEDURE — 160035 HCHG PACU - 1ST 60 MINS PHASE I: Performed by: INTERNAL MEDICINE

## 2025-02-10 PROCEDURE — 160009 HCHG ANES TIME/MIN: Performed by: INTERNAL MEDICINE

## 2025-02-10 RX ORDER — HALOPERIDOL 5 MG/ML
1 INJECTION INTRAMUSCULAR
Status: DISCONTINUED | OUTPATIENT
Start: 2025-02-10 | End: 2025-02-10 | Stop reason: HOSPADM

## 2025-02-10 RX ORDER — HYDROMORPHONE HYDROCHLORIDE 1 MG/ML
0.4 INJECTION, SOLUTION INTRAMUSCULAR; INTRAVENOUS; SUBCUTANEOUS
Status: DISCONTINUED | OUTPATIENT
Start: 2025-02-10 | End: 2025-02-10 | Stop reason: HOSPADM

## 2025-02-10 RX ORDER — AMLODIPINE BESYLATE 5 MG/1
5 TABLET ORAL
Status: DISCONTINUED | OUTPATIENT
Start: 2025-02-10 | End: 2025-02-11 | Stop reason: HOSPADM

## 2025-02-10 RX ORDER — DIPHENHYDRAMINE HYDROCHLORIDE 50 MG/ML
12.5 INJECTION INTRAMUSCULAR; INTRAVENOUS
Status: DISCONTINUED | OUTPATIENT
Start: 2025-02-10 | End: 2025-02-10 | Stop reason: HOSPADM

## 2025-02-10 RX ORDER — METOPROLOL TARTRATE 1 MG/ML
1 INJECTION, SOLUTION INTRAVENOUS
Status: DISCONTINUED | OUTPATIENT
Start: 2025-02-10 | End: 2025-02-10 | Stop reason: HOSPADM

## 2025-02-10 RX ORDER — OXYCODONE HCL 5 MG/5 ML
10 SOLUTION, ORAL ORAL
Status: COMPLETED | OUTPATIENT
Start: 2025-02-10 | End: 2025-02-10

## 2025-02-10 RX ORDER — EPHEDRINE SULFATE 50 MG/ML
5 INJECTION, SOLUTION INTRAVENOUS
Status: DISCONTINUED | OUTPATIENT
Start: 2025-02-10 | End: 2025-02-10 | Stop reason: HOSPADM

## 2025-02-10 RX ORDER — LABETALOL HYDROCHLORIDE 5 MG/ML
5 INJECTION, SOLUTION INTRAVENOUS
Status: DISCONTINUED | OUTPATIENT
Start: 2025-02-10 | End: 2025-02-10 | Stop reason: HOSPADM

## 2025-02-10 RX ORDER — ONDANSETRON 2 MG/ML
4 INJECTION INTRAMUSCULAR; INTRAVENOUS
Status: DISCONTINUED | OUTPATIENT
Start: 2025-02-10 | End: 2025-02-10 | Stop reason: HOSPADM

## 2025-02-10 RX ORDER — OXYCODONE HCL 5 MG/5 ML
5 SOLUTION, ORAL ORAL
Status: COMPLETED | OUTPATIENT
Start: 2025-02-10 | End: 2025-02-10

## 2025-02-10 RX ORDER — ALBUTEROL SULFATE 5 MG/ML
2.5 SOLUTION RESPIRATORY (INHALATION)
Status: DISCONTINUED | OUTPATIENT
Start: 2025-02-10 | End: 2025-02-10 | Stop reason: HOSPADM

## 2025-02-10 RX ORDER — HYDROMORPHONE HYDROCHLORIDE 1 MG/ML
0.1 INJECTION, SOLUTION INTRAMUSCULAR; INTRAVENOUS; SUBCUTANEOUS
Status: DISCONTINUED | OUTPATIENT
Start: 2025-02-10 | End: 2025-02-10 | Stop reason: HOSPADM

## 2025-02-10 RX ORDER — HYDROMORPHONE HYDROCHLORIDE 1 MG/ML
0.2 INJECTION, SOLUTION INTRAMUSCULAR; INTRAVENOUS; SUBCUTANEOUS
Status: DISCONTINUED | OUTPATIENT
Start: 2025-02-10 | End: 2025-02-10 | Stop reason: HOSPADM

## 2025-02-10 RX ORDER — SODIUM CHLORIDE, SODIUM LACTATE, POTASSIUM CHLORIDE, CALCIUM CHLORIDE 600; 310; 30; 20 MG/100ML; MG/100ML; MG/100ML; MG/100ML
INJECTION, SOLUTION INTRAVENOUS
Status: DISCONTINUED | OUTPATIENT
Start: 2025-02-10 | End: 2025-02-10 | Stop reason: SURG

## 2025-02-10 RX ORDER — HYDRALAZINE HYDROCHLORIDE 20 MG/ML
5 INJECTION INTRAMUSCULAR; INTRAVENOUS
Status: DISCONTINUED | OUTPATIENT
Start: 2025-02-10 | End: 2025-02-10 | Stop reason: HOSPADM

## 2025-02-10 RX ORDER — HEPARIN SODIUM 5000 [USP'U]/ML
5000 INJECTION, SOLUTION INTRAVENOUS; SUBCUTANEOUS EVERY 8 HOURS
Status: DISCONTINUED | OUTPATIENT
Start: 2025-02-10 | End: 2025-02-11 | Stop reason: HOSPADM

## 2025-02-10 RX ORDER — PHENYLEPHRINE HCL IN 0.9% NACL 1 MG/10 ML
SYRINGE (ML) INTRAVENOUS PRN
Status: DISCONTINUED | OUTPATIENT
Start: 2025-02-10 | End: 2025-02-10 | Stop reason: SURG

## 2025-02-10 RX ORDER — LIDOCAINE HYDROCHLORIDE 20 MG/ML
INJECTION, SOLUTION EPIDURAL; INFILTRATION; INTRACAUDAL; PERINEURAL PRN
Status: DISCONTINUED | OUTPATIENT
Start: 2025-02-10 | End: 2025-02-10 | Stop reason: SURG

## 2025-02-10 RX ADMIN — SODIUM CHLORIDE, POTASSIUM CHLORIDE, SODIUM LACTATE AND CALCIUM CHLORIDE: 600; 310; 30; 20 INJECTION, SOLUTION INTRAVENOUS at 05:02

## 2025-02-10 RX ADMIN — Medication 100 MCG: at 15:04

## 2025-02-10 RX ADMIN — OXYCODONE HYDROCHLORIDE 10 MG: 5 SOLUTION ORAL at 15:17

## 2025-02-10 RX ADMIN — LIDOCAINE HYDROCHLORIDE 80 MG: 20 INJECTION, SOLUTION EPIDURAL; INFILTRATION; INTRACAUDAL; PERINEURAL at 14:59

## 2025-02-10 RX ADMIN — AMLODIPINE BESYLATE 5 MG: 5 TABLET ORAL at 17:33

## 2025-02-10 RX ADMIN — OXYCODONE 5 MG: 5 TABLET ORAL at 02:11

## 2025-02-10 RX ADMIN — SODIUM CHLORIDE, POTASSIUM CHLORIDE, SODIUM LACTATE AND CALCIUM CHLORIDE: 600; 310; 30; 20 INJECTION, SOLUTION INTRAVENOUS at 14:57

## 2025-02-10 RX ADMIN — MORPHINE SULFATE 1 MG: 4 INJECTION, SOLUTION INTRAMUSCULAR; INTRAVENOUS at 22:35

## 2025-02-10 RX ADMIN — HEPARIN SODIUM 5000 UNITS: 5000 INJECTION, SOLUTION INTRAVENOUS; SUBCUTANEOUS at 17:33

## 2025-02-10 RX ADMIN — SUCRALFATE ORAL SUSPENSION 1 G: 1 SUSPENSION ORAL at 00:00

## 2025-02-10 RX ADMIN — SUCRALFATE ORAL SUSPENSION 1 G: 1 SUSPENSION ORAL at 17:33

## 2025-02-10 RX ADMIN — PROPOFOL 100 MG: 10 INJECTION, EMULSION INTRAVENOUS at 14:59

## 2025-02-10 RX ADMIN — OXYCODONE 5 MG: 5 TABLET ORAL at 08:23

## 2025-02-10 RX ADMIN — SUCRALFATE ORAL SUSPENSION 1 G: 1 SUSPENSION ORAL at 12:15

## 2025-02-10 RX ADMIN — PROPOFOL 20 MG: 10 INJECTION, EMULSION INTRAVENOUS at 15:03

## 2025-02-10 RX ADMIN — HEPARIN SODIUM 5000 UNITS: 5000 INJECTION, SOLUTION INTRAVENOUS; SUBCUTANEOUS at 21:44

## 2025-02-10 RX ADMIN — OXYCODONE 5 MG: 5 TABLET ORAL at 21:43

## 2025-02-10 RX ADMIN — OMEPRAZOLE 20 MG: 20 CAPSULE, DELAYED RELEASE ORAL at 04:14

## 2025-02-10 RX ADMIN — SODIUM CHLORIDE, POTASSIUM CHLORIDE, SODIUM LACTATE AND CALCIUM CHLORIDE: 600; 310; 30; 20 INJECTION, SOLUTION INTRAVENOUS at 23:45

## 2025-02-10 RX ADMIN — SUCRALFATE ORAL SUSPENSION 1 G: 1 SUSPENSION ORAL at 04:14

## 2025-02-10 RX ADMIN — OMEPRAZOLE 20 MG: 20 CAPSULE, DELAYED RELEASE ORAL at 17:33

## 2025-02-10 RX ADMIN — FENTANYL CITRATE 50 MCG: 50 INJECTION, SOLUTION INTRAMUSCULAR; INTRAVENOUS at 15:18

## 2025-02-10 RX ADMIN — PROPOFOL 20 MG: 10 INJECTION, EMULSION INTRAVENOUS at 15:00

## 2025-02-10 RX ADMIN — FENTANYL CITRATE 50 MCG: 50 INJECTION, SOLUTION INTRAMUSCULAR; INTRAVENOUS at 15:33

## 2025-02-10 ASSESSMENT — PAIN DESCRIPTION - PAIN TYPE
TYPE: ACUTE PAIN
TYPE: ACUTE PAIN
TYPE: SURGICAL PAIN
TYPE: SURGICAL PAIN
TYPE: ACUTE PAIN
TYPE: SURGICAL PAIN
TYPE: ACUTE PAIN
TYPE: ACUTE PAIN
TYPE: SURGICAL PAIN
TYPE: SURGICAL PAIN

## 2025-02-10 ASSESSMENT — ENCOUNTER SYMPTOMS
NAUSEA: 1
VOMITING: 1
ABDOMINAL PAIN: 1

## 2025-02-10 NOTE — ED NOTES
Report given to KJ Amezcua.  Transport at bedside for transfer of pt to S616.  All belongings with pt on transfer to floor.

## 2025-02-10 NOTE — CARE PLAN
The patient is Stable - Low risk of patient condition declining or worsening    Shift Goals  Clinical Goals: Pain control <4/10, safety  Patient Goals: rest  Family Goals: sarahy    Progress made toward(s) clinical / shift goals: Patient alert and oriented, calm and appropriate. Reports abdominal pain and prn medication with good relief. Patient ambulated to  multiples times with steady and SBA. Patient had multiple episodes of diarrhea throughout the night. Stool sample sent to lab for culture. Bed to lowest position, call light in reach and bed alarm on. All needs attended.     Patient is not progressing towards the following goals: N/A

## 2025-02-10 NOTE — ED NOTES
Med Rec complete per patient   Allergies reviewed  Antibiotics in the past 30 days:no  Anticoagulant in past 14 days:no  Pharmacy patient utilizes:[facility] on [address]

## 2025-02-10 NOTE — ASSESSMENT & PLAN NOTE
Secondary to ibuprofen use  IV fluid hydration   Monitor BMP and assess response  Avoid IV contrast/nephrotoxins/NSAIDs  Dose adjust meds for decreased GFR

## 2025-02-10 NOTE — ASSESSMENT & PLAN NOTE
BMI 30  Life style modifications including healthy plant based diet and regular exercise recommended

## 2025-02-10 NOTE — ASSESSMENT & PLAN NOTE
Hold lisinopril and hydrochlorothiazide due to YADIRA  Start amlodipine   As needed medications have been ordered

## 2025-02-10 NOTE — ANESTHESIA PREPROCEDURE EVALUATION
Case: [id number] Date/Time: 02/10/25 1405    Procedure: GASTROSCOPY (Esophagus)    Anesthesia type: MAC    Pre-op diagnosis: Abdominal pain, nausea vomiting, early satiety, intermittent dysphagia    Location: [facility] ROOM 26 / SURGERY SAME DAY [facility]    Surgeons: Arnol Finney M.D.            Relevant Problems   NEURO   (positive) Intractable migraine without aura      CARDIAC   (positive) Intractable migraine without aura   (positive) Primary hypertension      GI   (positive) Gastric ulcer   (positive) Hiatus hernia syndrome         (positive) Acute renal failure (HCC)      Other   (positive) Arthritis       Physical Exam    Airway   Mallampati: II  TM distance: >3 FB  Neck ROM: full       Cardiovascular - normal exam  Rhythm: regular  Rate: normal  (-) murmur     Dental - normal exam           Pulmonary - normal exam     Abdominal    Neurological - normal exam                   Anesthesia Plan    ASA 2       Plan - general and MAC       Airway plan will be natural airway          Induction: intravenous    Postoperative Plan: Postoperative administration of opioids is intended.    Pertinent diagnostic labs and testing reviewed    Informed Consent:    Anesthetic plan and risks discussed with patient.    Use of blood products discussed with: patient whom consented to blood products.

## 2025-02-10 NOTE — PROGRESS NOTES
Hospital Medicine Daily Progress Note    Date of Service  2/10/2025    Chief Complaint  Madhavi Coffman is a 59 y.o. female admitted 2/9/2025 with abd pain    Hospital Course  59 y.o. female who presented 2/9/2025 with past medical history of GERD with esophagitis, gastric bypass, migraines who presents to the hospital for epigastric abdominal pain has been present for the past 3 weeks.  It is associated with nausea, vomiting and diarrhea.  Patient would have 1-2 bowel movements a day.  Any sort of food or liquid would make the pain worse.  Patient was using 800 mg of ibuprofen twice daily for the past 3 weeks.       CT scan of the abdomen found evidence of gastric bypass and a small ulcer in the gastric atrium    GI consulted, EGD 2/10    Interval Problem Update  -Notes were extensively reviewed from primary team, radiology, ED, surgery, specialists, etc.   -Reviewed labs to date, microbiology for current admit and prior  -Imaging was independently reviewed and interpreted    Seen patient at bedside  Patient reports abdominal pain, worse after oral intake  I have reviewed CT of abdomen, per my review, possibly small ulcer in the stomach  Continue IVF, monitoring renal function  I have consulted and discussed with GI    I have discussed this patient's plan of care and discharge plan at IDT rounds today with Case Management, Nursing, Nursing leadership, and other members of the IDT team.    Consultants/Specialty  GI    Code Status  Full Code    Disposition  The patient is not medically cleared for discharge to home or a post-acute facility.      I have placed the appropriate orders for post-discharge needs.    Review of Systems  Review of Systems   Gastrointestinal:  Positive for abdominal pain, nausea and vomiting.   All other systems reviewed and are negative.       Physical Exam  Temp:  [36.1 °C (97 °F)-37.1 °C (98.8 °F)] 36.1 °C (97 °F)  Pulse:  [72-87] 75  Resp:  [15-18] 18  BP: (107-140)/(63-96)  117/96  SpO2:  [92 %-98 %] 97 %    Physical Exam  Vitals and nursing note reviewed.   Constitutional:       Appearance: Normal appearance.   HENT:      Head: Normocephalic and atraumatic.      Nose: Nose normal.      Mouth/Throat:      Pharynx: Oropharynx is clear.   Eyes:      Extraocular Movements: Extraocular movements intact.      Conjunctiva/sclera: Conjunctivae normal.      Pupils: Pupils are equal, round, and reactive to light.   Cardiovascular:      Rate and Rhythm: Normal rate and regular rhythm.      Pulses: Normal pulses.      Heart sounds: Normal heart sounds.   Pulmonary:      Effort: Pulmonary effort is normal.      Breath sounds: Normal breath sounds.   Abdominal:      General: Bowel sounds are normal.      Palpations: Abdomen is soft.      Tenderness: There is abdominal tenderness.   Musculoskeletal:         General: Normal range of motion.      Cervical back: Normal range of motion and neck supple.   Skin:     General: Skin is warm and dry.   Neurological:      General: No focal deficit present.      Mental Status: She is alert and oriented to person, place, and time. Mental status is at baseline.   Psychiatric:         Mood and Affect: Mood normal.         Behavior: Behavior normal.         Fluids    Intake/Output Summary (Last 24 hours) at 2/10/2025 1521  Last data filed at 2/10/2025 1505  Gross per 24 hour   Intake 1713.83 ml   Output --   Net 1713.83 ml        Laboratory  Recent Labs     02/09/25  1455 02/10/25  0221   WBC 6.8 4.6*   RBC 4.20 3.48*   HEMOGLOBIN 10.7* 8.6*   HEMATOCRIT 34.9* 30.2*   MCV 83.1 86.8   MCH 25.5* 24.7*   MCHC 30.7* 28.5*   RDW 53.1* 56.7*   PLATELETCT 265 218   MPV 9.4 9.6     Recent Labs     02/09/25  1455 02/10/25  0221   SODIUM 137 138   POTASSIUM 4.1 4.2   CHLORIDE 99 106   CO2 24 23   GLUCOSE 101* 91   BUN 30* 25*   CREATININE 2.04* 1.55*   CALCIUM 9.6 8.8                   Imaging  CT-ABDOMEN-PELVIS WITH   Final Result      1.  Postoperative changes status post  gastric bypass surgery. There is a probable small ulcer in the gastric antrum.   2.  Mild biliary dilatation in this postcholecystectomy patient.   3.  No acute inflammatory abnormality is seen.           Assessment/Plan  * Acute renal failure (HCC)- (present on admission)  Assessment & Plan  Secondary to ibuprofen use  IV fluid hydration   Monitor BMP and assess response  Avoid IV contrast/nephrotoxins/NSAIDs  Dose adjust meds for decreased GFR      Status post gastric bypass for obesity  Assessment & Plan  Hx of     Anemia  Assessment & Plan  Denies melena, hematochezia  Continue monitoring   Iron profile      Gastric ulcer  Assessment & Plan  Without any evidence of bleeding  Start omeprazole and Carafate  GI consulted     Primary hypertension- (present on admission)  Assessment & Plan  Hold lisinopril and hydrochlorothiazide due to YADIRA  Start amlodipine   As needed medications have been ordered    Obesity- (present on admission)  Assessment & Plan  BMI 30  Life style modifications including healthy plant based diet and regular exercise recommended            VTE prophylaxis: heparin     I have performed a physical exam and reviewed and updated ROS and Plan today (2/10/2025). In review of yesterday's note (2/9/2025), there are no changes except as documented above.    Patient is has a high medical complexity, complex decision making and is at high risk for complication, morbidity, and mortality.  I spent 51 minutes, reviewing the chart, obtaining and/or reviewing separately obtained history. Performing a medically appropriate examination and evaluation.  Counseling and educating the patient. Ordering and reviewing medications, tests, or procedures.  Discussing the case with GI.  Documenting clinical information in EPIC. Independently interpreting results and communicating results to patient. Discussing future disposition of care with patient, RN and case management.  This does not include time spent on separately  billable procedures/tests.

## 2025-02-10 NOTE — PROGRESS NOTES
4 Eyes Skin Assessment Completed by Carmel RN and KJ Escalera.    Head WDL  Ears WDL  Nose WDL  Mouth WDL  Neck WDL  Breast/Chest WDL  Shoulder Blades WDL  Spine Scar on lower back/spine  (R) Arm/Elbow/Hand WDL  (L) Arm/Elbow/Hand WDL  Abdomen WDL  Groin WDL  Scrotum/Coccyx/Buttocks WDL  (R) Leg WDL  (L) Leg WDL  (R) Heel/Foot/Toe dry heels  (L) Heel/Foot/Toe dry heels          Devices In Places Blood Pressure Cuff      Interventions In Place N/A    Possible Skin Injury No    Pictures Uploaded Into Epic Yes  Wound Consult Placed N/A  RN Wound Prevention Protocol Ordered No

## 2025-02-10 NOTE — OR NURSING
1508 Patient arrived to PACU. Report received from anesthesia and OR RN. Patient on 6L of oxygen via mask. Placed on monitor. Patient awake.     1518 Dr. Finney at bedside to talk to patient. Patient medicated for pain per MAR. Patient tolerating sips of water.     1533 Subsequent dose of pain medication given and heat pack applied to stomach.     1545 Patient states pain improved now resting. Denies any additional needs.    1551 Report given to Goldie UNDERWOOD.     1606 Patient with transport back to room.

## 2025-02-10 NOTE — ANESTHESIA TIME REPORT
Anesthesia Start and Stop Event Times       Date Time Event    2/10/2025 1443 Ready for Procedure     1457 Anesthesia Start     1510 Anesthesia Stop          Responsible Staff  02/10/25      Name Role Begin End    Javan Lane M.D. Anesth 1457 1510          Overtime Reason:  overtime    Comments:

## 2025-02-10 NOTE — PROCEDURES
OPERATIVE REPORT    PATIENT:   Madhavi Coffman   1965       PREOPERATIVE DIAGNOSES/INDICATIONS: N/V, abd pain.     POSTOPERATIVE DIAGNOSIS:   S/p bypass, consistent with RY.   Dilated the GJ junction from 13 to 15mm.   The EG junction is tortuous, but no real stenosis.       PROCEDURE:  ESOPHAGOGASTRODUODENOSCOPY    PHYSICIAN:  Arnol Finney MD. MPH.    ANESTHESIA:  Per anesthesiologist or ICU/ED team.     LOCATION: [facility]    CONSENT:  OBTAINED by ICU team. The risks, benefits and alternatives of the procedure were discussed in details. The risks include and are not limited to bleeding, infection, perforation, missed lesions, and sedations risks (cardiopulmonary compromise and allergic reaction to medications).    DESCRIPTION: Scope team at bedside  Patient was placed on his/her left lateral decubitus position. A bite block was placed in patient's mouth. Patient was sedated by anesthesia.  Vital signs were monitored throughout the procedure.  Oxygenation support was provided throughout the procedure. Upper endoscope was inserted into patient's mouth and advanced to the 30cm into small bowel  under direct visualization.      Once the site was reached and examined, the upper endoscope was withdrawn.  Retroflexion was made within the stomach.  The stomach was decompressed, scope was withdrawn and the procedure was terminated.    The patient tolerated the procedure well.  There were no immediate complications.    Estimate procedure related bleeding: less than 1cc.     OPERATIVE FINDINGS:    1. Esophagus: Grossly normal.   2. Stomach: Hiatal hernia 1cm, tortuous EG junction. 15mm balloon did not detect any stenosis.   S/p bypass, consistent with RY.   Dilated the GJ junction from 13 to 15mm.   3. Small bowel, grossly normal.       RECOMMENDATIONS:  Okay to resume diet. Small multiple meals a day.   Sit straight up or even standing while eating would help.   GI signs off.     This note has been transcribed with  digital voice recognition software and although it has been reviewed may contain grammatical or word errors

## 2025-02-10 NOTE — CONSULTS
Date of Consultation:  2/10/2025    Patient: : Madhavi Coffman  MRN: [id number]    Referring Physician:  Clive Laughlin M.D.Attending      GI:Arnol Finney M.D.     Reason for Consultation: Abdominal pain, nausea vomiting, early satiety, intermittent dysphagia    History of Present Illness:   Patient 59 years old female medical history of low back pain, gastric bypass, arthritis, GERD, esophagitis, hiatal hernia, peptic stricture s/p multiple EGD and the dilatation, the last 1 likely 3 years ago, presented to hospital for persistent upper GI symptoms including abdomen discomfort, nausea vomiting and intermittent dysphagia GI team is consulted for possible upper GI scope for diagnosis and treatment.,     We saw the team the patient at the bedside.  The patient is NPO.  Not much symptom when she is not eating.  The patient reports worsening above-mentioned symptoms in the last couple weeks.  Denied symptoms or sign or evidence of GI bleeding at this time.    Bedside exam show grossly benign middle and lower abdomen, the symptom is more from the junction between abdomen and the chest.      Past Medical History:   Diagnosis Date    Vitamin D deficiency 02/07/2024    DDD (degenerative disc disease), lumbar 02/07/2024    Primary hypertension 02/07/2024    Bp poorly controlled today.  Needs refills for lisinopril  and HCTZ    Hyperlipidemia LDL goal <100 02/07/2024    LBP (low back pain) 09/07/2012    8/10    Anxiety     Arthritis     back, osteoarthritis    Depression     Diverticulosis     GERD with esophagitis     Hiatus hernia syndrome     had surgical repair    Migraine without aura, with intractable migraine, so stated, without mention of status migrainosus     Obesity     Peripheral neuropathy     Thoracic or lumbosacral neuritis or radiculitis, unspecified     bilateral L4-S1 radiculopathies         Past Surgical History:   Procedure Laterality Date    INJ,SACROILIAC,ANES/STEROID  10/18/2013    Performed by Sonny  VITALIY Archuleta D.O. at [facility] ORS    INJ,SACROILIAC,ANES/STEROID  8/16/2013    Performed by Jose Eduardo Archuleta D.O. at [facility]    INJ,SACROILIAC,ANES/STEROID  8/16/2013    Performed by Jose Eduardo Archuleta D.O. at [facility]    NEURO DEST FACET L/S W/IG SNGL  4/19/2013    Performed by Jose Eduardo Archuleta D.O. at [facility]    NEURO DEST FACET L/S W/IG ADDL  4/19/2013    Performed by Jose Eduardo Archuleta D.O. at [facility]    NEURO DEST FACET L/S W/IG ADDL  4/19/2013    Performed by Jose Eduardo Archuleta D.O. at [facility]    NEURO DEST FACET L/S W/IG SNGL  3/29/2013    Performed by Jose Eduardo Archuleta D.O. at [facility]    NEURO DEST FACET L/S W/IG ADDL  3/29/2013    Performed by Jose Eduardo Archuleta D.O. at [facility]    NEURO DEST FACET L/S W/IG ADDL  3/29/2013    Performed by Jose Eduardo Archuleta D.O. at [facility]    NEURO DEST FACET L/S W/IG ADDL  3/29/2013    Performed by Jose Eduardo Archuleta D.O. at [facility]    INJ,SACROILIAC,ANES/STEROID  12/28/2012    Performed by Jose Eduardo Archuleta D.O. at [facility]    INJ,SACROILIAC,ANES/STEROID  11/9/2012    Performed by Jose Eduardo Archuleta D.O. at [facility]    THORACIC LAMINECTOMY  9/18/2012    Performed by King Leal M.D. at [facility]    SPINAL CORD STIMULATOR  9/18/2012    Performed by King Leal M.D. at [facility]    INJ,SACROILIAC,ANES/STEROID  7/27/2012    Performed by JOSE EDUARDO ARCHULETA at [facility]    INJ,SACROILIAC,ANES/STEROID  7/27/2012    Performed by JOSE EDUARDO ARCHULETA at [facility]    SC PERCUT IMPLNT NEUROELECT,EPIDURAL  6/22/2012    Performed by JOSE EDUARDO ARCHULETA at [facility]    SC PERCUT IMPLNT NEUROELECT,EPIDURAL  6/22/2012    Performed by JOSE EDUARDO ARCHULETA at [facility]    FUSION, SPINE,  LUMBAR, PLIF  7/26/2011    Performed by DANIAL KABA at [facility]    LUMBAR DECOMPRESSION  7/26/2011    Performed by DANIAL KABA at [facility]    HARDWARE REMOVAL ORTHO  7/26/2011    Performed by DANIAL KABA at [facility]    S I JOINT FUSION  11/11/2010    Performed by DANIAL KABA at [facility]    GASTRIC BYPASS LAPAROSCOPIC  4/5/2010    Performed by GANSER, JOHN H at SURGERY [facility]    HIATAL HERNIA REPAIR  4/5/2010    Performed by GANSER, JOHN H at SURGERY [facility]S    FUSION, SPINE, LUMBAR, PLIF  11/4/2009    Performed by DANIAL KABA at SUR[facility]ERY [facility]    LUMBAR DECOMPRESSION  11/4/2009    Performed by DANIAL KABA at SURGERY [facility]    GA REMOVAL OF OVARY/TUBE(S)  2005    left    SIGMOID COLECTOMY  2004    OTHER  1999    lasik surgery    TONSILLECTOMY  1986    CHOLECYSTECTOMY      GASTRIC BYPASS LAPAROSCOPIC      x2    GA BREAST REDUCTION         Family History   Problem Relation Age of Onset    Cancer Father         ? type of cancer    Diabetes Father     Hypertension Father     Other Father         Migraine    Cancer Maternal Aunt         breast    Heart Disease Maternal Grandmother     Dementia Maternal Grandmother     Heart Disease Maternal Grandfather     Alcohol/Drug Paternal Grandfather        Constitutional: No fevers.  Eyes: No symptoms reported.   Ears/Nose/Throat/Mouth: No choking reported.  Cardiovascular: No chest pain reported.   Respiratory: Denies shortness of breath.  Gastrointestinal/Hepatic: Per H/P.   Skin/Breast: No new rash reported.   Psychiatric: No complaints    HEENT: grossly normal.  Cardiovascular: Please refer to primary team's daily assessment.   Lungs: Please refer to primary team's daily assessment.   Abdomen: No evidence of acute abdomen  Skin: No erythema, No rash.  Lower limbs: normal, no pitting edema.   Neurologic: Alert & oriented x 3, Normal motor function, No  focal deficits noted.  PSY: stable mood.       Social History     Socioeconomic History    Marital status:    Tobacco Use    Smoking status: Former     Current packs/day: 0.00     Types: Cigarettes     Quit date: 2024     Years since quittin.1    Smokeless tobacco: Never    Tobacco comments:     .  Quit smoking 2022   Vaping Use    Vaping status: Never Used   Substance and Sexual Activity    Alcohol use: Yes     Alcohol/week: 0.5 oz     Types: 1 Standard drinks or equivalent per week     Comment: 2 per week    Drug use: No    Sexual activity: Yes     Partners: Male     Social Drivers of Health     Food Insecurity: No Food Insecurity (2025)    Hunger Vital Sign     Worried About Running Out of Food in the Last Year: Never true     Ran Out of Food in the Last Year: Never true   Transportation Needs: No Transportation Needs (2025)    PRAPARE - Transportation     Lack of Transportation (Medical): No     Lack of Transportation (Non-Medical): No   Intimate Partner Violence: Not At Risk (2025)    Humiliation, Afraid, Rape, and Kick questionnaire     Fear of Current or Ex-Partner: No     Emotionally Abused: No     Physically Abused: No     Sexually Abused: No   Housing Stability: Low Risk  (2025)    Housing Stability Vital Sign     Unable to Pay for Housing in the Last Year: No     Number of Times Moved in the Last Year: 0     Homeless in the Last Year: No           Physical Exam:  Vitals:    25 2049 02/10/25 0211 02/10/25 0311 02/10/25 0745   BP:    137/83   Pulse:    75   Resp: 18 18 18 16   Temp:    36.7 °C (98 °F)   TempSrc:    Temporal   SpO2:    98%   Weight:       Height:                 Labs:  Recent Labs     25  1455 02/10/25  0221   WBC 6.8 4.6*   RBC 4.20 3.48*   HEMOGLOBIN 10.7* 8.6*   HEMATOCRIT 34.9* 30.2*   MCV 83.1 86.8   MCH 25.5* 24.7*   MCHC 30.7* 28.5*   RDW 53.1* 56.7*   PLATELETCT 265 218   MPV 9.4 9.6     Recent Labs     25  1455  02/10/25  0221   SODIUM 137 138   POTASSIUM 4.1 4.2   CHLORIDE 99 106   CO2 24 23   GLUCOSE 101* 91   BUN 30* 25*           Recent Labs     02/09/25  1455 02/10/25  0221   ASTSGOT 21 16   ALTSGPT 10 6   TBILIRUBIN 0.8 0.5   ALKPHOSPHAT 167* 125*   GLOBULIN 2.5 1.8*         Imaging:  CT-ABDOMEN-PELVIS WITH  Narrative: 2/9/2025 3:57 PM    HISTORY/REASON FOR EXAM:  pain.  Abdominal pain    TECHNIQUE/EXAM DESCRIPTION:   CT scan of the abdomen and pelvis with contrast.    Contrast-enhanced helical scanning was obtained from the diaphragmatic domes through the pubic symphysis following the bolus administration of nonionic contrast without complication.    96 mL of Omnipaque 350 nonionic contrast was administered without complication.    Low dose optimization technique was utilized for this CT exam including automated exposure control and adjustment of the mA and/or kV according to patient size.    COMPARISON: 6/14/2007    FINDINGS:  Lower Chest: Unremarkable.    Liver: Normal.    Spleen: Unremarkable.    Pancreas: Unremarkable.    Gallbladder: The gallbladder has been resected.    Biliary: Mild intrahepatic and extrahepatic bili dilatation in this postcholecystectomy patient.    Adrenal glands: Normal.    Kidneys: No hydronephrosis. Mild left renal cortical scarring..    Bowel: Gastric bypass surgery. There is small fluid collection/outpouching in the gastric antrum which could represent a small ulcer measuring about 1.1 cm. No bowel obstruction. The appendix is normal..    Lymph nodes: No adenopathy.    Vasculature: Atherosclerosis.    Peritoneum: Unremarkable without ascites.    Musculoskeletal: No acute or destructive process. Lumbar spinal fusion and left sacroiliac fusion. Degenerative changes. There is a spinal stimulator device.    Pelvis: Unremarkable bladder. No significant adnexal mass is seen.  Impression: 1.  Postoperative changes status post gastric bypass surgery. There is a probable small ulcer in the  gastric antrum.  2.  Mild biliary dilatation in this postcholecystectomy patient.  3.  No acute inflammatory abnormality is seen.            Impressions:  Patient 59 years old female medical history of low back pain, gastric bypass, arthritis, GERD, esophagitis, hiatal hernia, peptic stricture s/p multiple EGD and the dilatation, the last 1 likely 3 years ago, presented to hospital for persistent upper GI symptoms including abdomen discomfort, nausea vomiting and intermittent dysphagia GI team is consulted for possible upper GI scope for diagnosis and treatment.,     We saw the team the patient at the bedside.  The patient is NPO.  Not much symptom when she is not eating.  The patient reports worsening above-mentioned symptoms in the last couple weeks.  Denied symptoms or sign or evidence of GI bleeding at this time.    Could be anastomotic, marginal erosion or ulcer, could be GERD with active i inflammation in the recurrent peptic stricture.    GI team will offer upper GI scope with today in the OR for diagnosis and possible treatment.    Diagnosis: Abdominal pain nausea vomiting, early satiety, dysphagia  Procedure: Esophagogastroduodenoscopy with biopsy, possible intervention including banding and a dilatation.        This note was generated using voice recognition software which has a small chance of producing errors of grammar and possibly content. I have made every reasonable attempt to find and correct any obvious errors, but expect that some may not be found prior to finalization of this note.

## 2025-02-10 NOTE — H&P
Hospital Medicine History & Physical Note    Date of Service  2/9/2025    Primary Care Physician  Pcp Pt States None    Consultants      Specialist Names:     Code Status  Full Code    Chief Complaint  Chief Complaint   Patient presents with    Abdominal Pain     Pt reports LUQ pain x 3 weeks, seen at  yesterday and was told to come to ER for assessment. +intermittent diarrhea, +n/v with severe pain       History of Presenting Illness  Madhavi Coffman is a 59 y.o. female who presented 2/9/2025 with past medical history of GERD with esophagitis, gastric bypass, migraines who presents to the hospital for epigastric abdominal pain has been present for the past 3 weeks.  The pain is mostly intermittent.  The pain will be worse when she leans forward.  It is associated with nausea, vomiting and diarrhea.  Patient would have 1-2 bowel movements a day.  Any sort of food or liquid would make the pain worse.  Patient was using 800 mg of ibuprofen twice daily for the past 3 weeks.  She denies smoking cigarettes but she does drink alcohol.  The patient was evaluated urgent care and asked to come to the ER.  The patient made urine since coming to the hospital.    CT scan of the abdomen found evidence of gastric bypass and a small ulcer in the gastric atrium    I discussed the plan of care with patient.    Review of Systems  Review of Systems   Constitutional:  Negative for chills, diaphoresis, fever and malaise/fatigue.   HENT:  Negative for congestion, ear discharge, ear pain, hearing loss, nosebleeds, sinus pain, sore throat and tinnitus.    Eyes:  Negative for blurred vision, double vision, photophobia and pain.   Respiratory:  Negative for cough, hemoptysis, sputum production, shortness of breath, wheezing and stridor.    Cardiovascular:  Negative for chest pain, palpitations, orthopnea, claudication, leg swelling and PND.   Gastrointestinal:  Positive for abdominal pain, diarrhea, heartburn, nausea and vomiting.  Negative for blood in stool, constipation and melena.   Genitourinary:  Negative for dysuria, flank pain, frequency, hematuria and urgency.   Musculoskeletal:  Negative for back pain, falls, joint pain, myalgias and neck pain.   Skin:  Negative for itching and rash.   Neurological:  Negative for dizziness, tingling, tremors, weakness and headaches.   Endo/Heme/Allergies:  Negative for environmental allergies and polydipsia. Does not bruise/bleed easily.   Psychiatric/Behavioral:  Negative for depression, hallucinations, substance abuse and suicidal ideas.        Past Medical History   has a past medical history of Anxiety, Arthritis, DDD (degenerative disc disease), lumbar (02/07/2024), Depression, Diverticulosis, GERD with esophagitis, Hiatus hernia syndrome, Hyperlipidemia LDL goal <100 (02/07/2024), LBP (low back pain) (09/07/2012), Migraine without aura, with intractable migraine, so stated, without mention of status migrainosus, Obesity, Peripheral neuropathy, Primary hypertension (02/07/2024), Thoracic or lumbosacral neuritis or radiculitis, unspecified, and Vitamin D deficiency (02/07/2024).    Surgical History   has a past surgical history that includes sigmoid colectomy (2004); gastric bypass laparoscopic; pr removal of ovary/tube(s) (2005); tonsillectomy (1986); cholecystectomy; other (1999); fusion, spine, lumbar, plif (11/4/2009); lumbar decompression (11/4/2009); gastric bypass laparoscopic (4/5/2010); hiatal hernia repair (4/5/2010); s i joint fusion (11/11/2010); pr breast reduction; fusion, spine, lumbar, plif (7/26/2011); lumbar decompression (7/26/2011); hardware removal ortho (7/26/2011); pr percut implnt neuroelect,epidural (6/22/2012); pr percut implnt neuroelect,epidural (6/22/2012); inj,sacroiliac,anes/steroid (7/27/2012); inj,sacroiliac,anes/steroid (7/27/2012); thoracic laminectomy (9/18/2012); spinal cord stimulator (9/18/2012); inj,sacroiliac,anes/steroid (11/9/2012);  inj,sacroiliac,anes/steroid (12/28/2012); neuro dest facet l/s w/ig sngl (3/29/2013); neuro dest facet l/s w/ig addl (3/29/2013); neuro dest facet l/s w/ig addl (3/29/2013); neuro dest facet l/s w/ig addl (3/29/2013); neuro dest facet l/s w/ig sngl (4/19/2013); neuro dest facet l/s w/ig addl (4/19/2013); neuro dest facet l/s w/ig addl (4/19/2013); inj,sacroiliac,anes/steroid (8/16/2013); inj,sacroiliac,anes/steroid (8/16/2013); and inj,sacroiliac,anes/steroid (10/18/2013).     Family History  family history includes Alcohol/Drug in her paternal grandfather; Cancer in her father and maternal aunt; Dementia in her maternal grandmother; Diabetes in her father; Heart Disease in her maternal grandfather and maternal grandmother; Hypertension in her father; Other in her father.   Family history reviewed with patient. There is no family history that is pertinent to the chief complaint.     Social History   reports that she quit smoking about 13 months ago. Her smoking use included cigarettes. She has never used smokeless tobacco. She reports current alcohol use of about 0.5 oz of alcohol per week. She reports that she does not use drugs.    Allergies  Allergies   Allergen Reactions    Neosporin [Neomycin-Bacitracin-Polymyxin] Vomiting and Swelling    Sudafed [Pseudoephedrine Hcl] Rash          Cleocin [Clindamycin Hcl]      Stomach upset    Tape Contact Dermatitis       Medications  Prior to Admission Medications   Prescriptions Last Dose Informant Patient Reported? Taking?   hydroCHLOROthiazide 25 MG Tab 2/9/2025 Morning Patient No Yes   Sig: Take 1 Tablet by mouth every day for 45 days.   ibuprofen (MOTRIN) 200 MG Tab 2/9/2025 Morning Patient Yes Yes   Sig: Take 400-800 mg by mouth every 8 hours as needed for Mild Pain. 400 mg = 2 tabs  800 mg = 4 tabs   lisinopril (PRINIVIL) 20 MG Tab 2/9/2025 Morning Patient No Yes   Sig: Take 1 Tablet by mouth every day for 45 days.      Facility-Administered Medications: None        Physical Exam  Temp:  [36.3 °C (97.3 °F)-36.6 °C (97.8 °F)] 36.3 °C (97.3 °F)  Pulse:  [] 81  Resp:  [15-20] 18  BP: (107-132)/(61-89) 132/84  SpO2:  [93 %-99 %] 95 %  Blood Pressure: 132/84   Temperature: 36.3 °C (97.3 °F)   Pulse: 81   Respiration: 18   Pulse Oximetry: 95 %       Physical Exam  Vitals and nursing note reviewed.   Constitutional:       General: She is not in acute distress.     Appearance: Normal appearance. She is not ill-appearing, toxic-appearing or diaphoretic.   HENT:      Head: Normocephalic and atraumatic.      Nose: No congestion or rhinorrhea.      Mouth/Throat:      Pharynx: No oropharyngeal exudate or posterior oropharyngeal erythema.   Eyes:      General: No scleral icterus.  Neck:      Vascular: No carotid bruit or JVD.   Cardiovascular:      Rate and Rhythm: Normal rate and regular rhythm.      Pulses: Normal pulses.      Heart sounds: Normal heart sounds. No murmur heard.     No friction rub. No gallop.   Pulmonary:      Effort: Pulmonary effort is normal. No respiratory distress.      Breath sounds: No stridor. No wheezing, rhonchi or rales.   Abdominal:      General: Abdomen is flat. There is no distension.      Palpations: There is no mass.      Tenderness: There is abdominal tenderness. There is no left CVA tenderness, guarding or rebound.      Hernia: No hernia is present.   Musculoskeletal:         General: No swelling. Normal range of motion.      Cervical back: No rigidity. No muscular tenderness.      Right lower leg: No edema.      Left lower leg: No edema.   Lymphadenopathy:      Cervical: No cervical adenopathy.   Skin:     General: Skin is warm and dry.      Coloration: Skin is not jaundiced or pale.      Findings: No bruising or erythema.   Neurological:      Mental Status: She is alert.         Laboratory:  Recent Labs     02/09/25  1455   WBC 6.8   RBC 4.20   HEMOGLOBIN 10.7*   HEMATOCRIT 34.9*   MCV 83.1   MCH 25.5*   MCHC 30.7*   RDW 53.1*   PLATELETCT  "265   MPV 9.4     Recent Labs     02/09/25  1455   SODIUM 137   POTASSIUM 4.1   CHLORIDE 99   CO2 24   GLUCOSE 101*   BUN 30*   CREATININE 2.04*   CALCIUM 9.6     Recent Labs     02/09/25  1455   ALTSGPT 10   ASTSGOT 21   ALKPHOSPHAT 167*   TBILIRUBIN 0.8   LIPASE 28   GLUCOSE 101*         No results for input(s): \"NTPROBNP\" in the last 72 hours.      No results for input(s): \"TROPONINT\" in the last 72 hours.    Imaging:  CT-ABDOMEN-PELVIS WITH   Final Result      1.  Postoperative changes status post gastric bypass surgery. There is a probable small ulcer in the gastric antrum.   2.  Mild biliary dilatation in this postcholecystectomy patient.   3.  No acute inflammatory abnormality is seen.          no X-Ray or EKG requiring interpretation    Assessment/Plan:  Justification for Admission Status  I anticipate this patient will require at least two midnights for appropriate medical management, necessitating inpatient admission because acute renal failure    Patient will need a Med/Surg bed on MEDICAL service .  The need is secondary to acute renal failure.    * Acute renal failure (HCC)- (present on admission)  Assessment & Plan  Secondary to ibuprofen use  IV fluid hydration   Monitor BMP and assess response  Avoid IV contrast/nephrotoxins/NSAIDs  Dose adjust meds for decreased GFR      Anemia  Assessment & Plan  Hemoglobin is around baseline  Continue to trend    Gastric ulcer  Assessment & Plan  Without any evidence of bleeding  Start omeprazole and Carafate    Primary hypertension- (present on admission)  Assessment & Plan  Hold lisinopril and hydrochlorothiazide  As needed medications have been ordered        VTE prophylaxis: SCDs/TEDs  "

## 2025-02-11 VITALS
SYSTOLIC BLOOD PRESSURE: 143 MMHG | RESPIRATION RATE: 18 BRPM | WEIGHT: 185.19 LBS | TEMPERATURE: 97.1 F | BODY MASS INDEX: 30.85 KG/M2 | OXYGEN SATURATION: 97 % | HEART RATE: 76 BPM | HEIGHT: 65 IN | DIASTOLIC BLOOD PRESSURE: 98 MMHG

## 2025-02-11 LAB
ALBUMIN SERPL BCP-MCNC: 3.1 G/DL (ref 3.2–4.9)
BUN SERPL-MCNC: 14 MG/DL (ref 8–22)
CALCIUM ALBUM COR SERPL-MCNC: 9.4 MG/DL (ref 8.5–10.5)
CALCIUM SERPL-MCNC: 8.7 MG/DL (ref 8.5–10.5)
CHLORIDE SERPL-SCNC: 107 MMOL/L (ref 96–112)
CO2 SERPL-SCNC: 25 MMOL/L (ref 20–33)
CREAT SERPL-MCNC: 1.01 MG/DL (ref 0.5–1.4)
ERYTHROCYTE [DISTWIDTH] IN BLOOD BY AUTOMATED COUNT: 54.6 FL (ref 35.9–50)
GFR SERPLBLD CREATININE-BSD FMLA CKD-EPI: 64 ML/MIN/1.73 M 2
GLUCOSE SERPL-MCNC: 94 MG/DL (ref 65–99)
HCT VFR BLD AUTO: 27.3 % (ref 37–47)
HGB BLD-MCNC: 8.2 G/DL (ref 12–16)
MCH RBC QN AUTO: 25.4 PG (ref 27–33)
MCHC RBC AUTO-ENTMCNC: 30 G/DL (ref 32.2–35.5)
MCV RBC AUTO: 84.5 FL (ref 81.4–97.8)
PHOSPHATE SERPL-MCNC: 3.7 MG/DL (ref 2.5–4.5)
PLATELET # BLD AUTO: 197 K/UL (ref 164–446)
PMV BLD AUTO: 9.5 FL (ref 9–12.9)
POTASSIUM SERPL-SCNC: 4.1 MMOL/L (ref 3.6–5.5)
RBC # BLD AUTO: 3.23 M/UL (ref 4.2–5.4)
SODIUM SERPL-SCNC: 141 MMOL/L (ref 135–145)
WBC # BLD AUTO: 3.5 K/UL (ref 4.8–10.8)

## 2025-02-11 PROCEDURE — 99239 HOSP IP/OBS DSCHRG MGMT >30: CPT | Performed by: STUDENT IN AN ORGANIZED HEALTH CARE EDUCATION/TRAINING PROGRAM

## 2025-02-11 PROCEDURE — 700102 HCHG RX REV CODE 250 W/ 637 OVERRIDE(OP): Performed by: STUDENT IN AN ORGANIZED HEALTH CARE EDUCATION/TRAINING PROGRAM

## 2025-02-11 PROCEDURE — A9270 NON-COVERED ITEM OR SERVICE: HCPCS | Performed by: HOSPITALIST

## 2025-02-11 PROCEDURE — 700102 HCHG RX REV CODE 250 W/ 637 OVERRIDE(OP): Performed by: HOSPITALIST

## 2025-02-11 PROCEDURE — 80069 RENAL FUNCTION PANEL: CPT

## 2025-02-11 PROCEDURE — 85027 COMPLETE CBC AUTOMATED: CPT

## 2025-02-11 PROCEDURE — A9270 NON-COVERED ITEM OR SERVICE: HCPCS | Performed by: STUDENT IN AN ORGANIZED HEALTH CARE EDUCATION/TRAINING PROGRAM

## 2025-02-11 PROCEDURE — 700111 HCHG RX REV CODE 636 W/ 250 OVERRIDE (IP): Mod: JZ | Performed by: STUDENT IN AN ORGANIZED HEALTH CARE EDUCATION/TRAINING PROGRAM

## 2025-02-11 PROCEDURE — 700105 HCHG RX REV CODE 258: Performed by: STUDENT IN AN ORGANIZED HEALTH CARE EDUCATION/TRAINING PROGRAM

## 2025-02-11 RX ORDER — AMLODIPINE BESYLATE 5 MG/1
5 TABLET ORAL DAILY
Qty: 100 TABLET | Refills: 3 | Status: SHIPPED | OUTPATIENT
Start: 2025-02-12 | End: 2026-03-19

## 2025-02-11 RX ORDER — FERROUS SULFATE 325(65) MG
325 TABLET ORAL DAILY
Qty: 30 TABLET | Refills: 0 | Status: SHIPPED | OUTPATIENT
Start: 2025-02-11

## 2025-02-11 RX ADMIN — AMLODIPINE BESYLATE 5 MG: 5 TABLET ORAL at 04:21

## 2025-02-11 RX ADMIN — SUCRALFATE ORAL SUSPENSION 1 G: 1 SUSPENSION ORAL at 04:21

## 2025-02-11 RX ADMIN — OMEPRAZOLE 20 MG: 20 CAPSULE, DELAYED RELEASE ORAL at 04:21

## 2025-02-11 RX ADMIN — SODIUM CHLORIDE 250 MG: 9 INJECTION, SOLUTION INTRAVENOUS at 09:43

## 2025-02-11 RX ADMIN — LIDOCAINE HYDROCHLORIDE 15 ML: 20 SOLUTION ORAL; TOPICAL at 09:36

## 2025-02-11 RX ADMIN — SUCRALFATE ORAL SUSPENSION 1 G: 1 SUSPENSION ORAL at 00:14

## 2025-02-11 RX ADMIN — HEPARIN SODIUM 5000 UNITS: 5000 INJECTION, SOLUTION INTRAVENOUS; SUBCUTANEOUS at 04:21

## 2025-02-11 ASSESSMENT — PAIN DESCRIPTION - PAIN TYPE: TYPE: ACUTE PAIN

## 2025-02-11 NOTE — ANESTHESIA POSTPROCEDURE EVALUATION
Patient: Madhavi Coffman    Procedure Summary       Date: 02/10/25 Room / Location: [facility] ROOM 26 / SURGERY SAME DAY [facility]    Anesthesia Start: 1457 Anesthesia Stop: 1510    Procedures:       GASTROSCOPY (Esophagus)      GASTROSCOPY, WITH BALLOON DILATION (Esophagus) Diagnosis: (gastric narrowing)    Surgeons: Arnol Finney M.D. Responsible Provider: Javan Lane M.D.    Anesthesia Type: general, MAC ASA Status: 2            Final Anesthesia Type: general, MAC  Last vitals  BP   Blood Pressure: 128/83    Temp   36.6 °C (97.8 °F)    Pulse   75   Resp   16    SpO2   94 %      Anesthesia Post Evaluation    Patient location during evaluation: PACU  Patient participation: complete - patient participated  Level of consciousness: awake and alert    Airway patency: patent  Anesthetic complications: no  Cardiovascular status: hemodynamically stable  Respiratory status: acceptable  Hydration status: euvolemic    PONV: none          No notable events documented.     Nurse Pain Score: 4 (NPRS)

## 2025-02-11 NOTE — DISCHARGE PLANNING
Case Management Discharge Planning    Admission Date: 2/9/2025  GMLOS: 2.2  ALOS: 2    6-Clicks ADL Score: 24  6-Clicks Mobility Score: 24      Anticipated Discharge Dispo: Discharge Disposition: Discharged to home/self care (01)  Discharge Address: [address] [place] [address] [place]  [address]  Discharge Contact Phone Number: [phone]    DME Needed: No    Action(s) Taken:   RN MIKE met with patient at bedside.  Pt sitting up.  Pt stated she lives with her mother in a mobile home.  Pt does not drive.  Her mother provides transportation.  Pt is independent with bathing, dressing, medications.    Medically Clear: Yes    Next Steps: DC    Barriers to Discharge: None    Care Transition Team Assessment    Information Source  Orientation Level: Oriented X4  Information Given By: Patient  Who is responsible for making decisions for patient? : Patient    Readmission Evaluation  Is this a readmission?: No    Elopement Risk  Legal Hold: No  Ambulatory or Self Mobile in Wheelchair: Yes  Disoriented: No  Psychiatric Symptoms: None  History of Wandering: No  Elopement this Admit: No  Vocalizing Wanting to Leave: No  Displays Behaviors, Body Language Wanting to Leave: No-Not at Risk for Elopement  Elopement Risk: Not at Risk for Elopement    Interdisciplinary Discharge Planning  Lives with - Patient's Self Care Capacity: Related Adult  Patient or legal guardian wants to designate a caregiver: No  Support Systems: Family Member(s)  Housing / Facility: [address]    Discharge Preparedness  What is your plan after discharge?: Home with help  What are your discharge supports?: Parent  Prior Functional Level: Ambulatory, Independent with Activities of Daily Living, Independent with Medication Management  Difficulity with ADLs: None  Difficulity with IADLs: None    Functional Assesment  Prior Functional Level: Ambulatory, Independent with Activities of Daily Living, Independent with Medication Management    Finances  Financial Barriers  to Discharge: No  Prescription Coverage: Yes    Vision / Hearing Impairment  Vision Impairment : Yes  Right Eye Vision: Impaired, Wears Glasses  Left Eye Vision: Impaired, Wears Glasses  Hearing Impairment : No         Advance Directive  Advance Directive?: None    Domestic Abuse  Have you ever been the victim of abuse or violence?: No  Possible Abuse/Neglect Reported to:: Not Applicable    Psychological Assessment  History of Substance Abuse: None  History of Psychiatric Problems: No    Discharge Risks or Barriers  Discharge risks or barriers?: Uninsured / underinsured  Patient risk factors: Uninsured or underinsured    Anticipated Discharge Information  Discharge Disposition: Discharged to home/self care (01)  Discharge Address: [address] [place] [address]  [id number]  Discharge Contact Phone Number: [phone]

## 2025-02-11 NOTE — PROGRESS NOTES
Patient had more than 4 loose stools in 24 hours. Charge nurse aware and Hospitalist, Epifanio Bourne notified. Per hospitalist, pt does not appear to have any risk factors at this time for c-diff r/o. Will continue monitoring patient.

## 2025-02-11 NOTE — DISCHARGE INSTRUCTIONS
Discharge Instructions per Clive Laughlin M.D.  Please stop HCTZ, lisinopril  Avoid any NSAIDs such as ibuprofen, Aleve, Motrin or Naproxen. Take tylenol over the counter if have mild headache or mild pain.    Start amlodipine for high blood pressure  Take iron tablets daily  Please follow-up with GI doctor for colonoscopy  Small multiple meals a day.   Sit straight up or even standing while eating would help.   Please follow-up with PCP as outpatient.    Return to ER in the event of new or worsening symptoms. Please note importance of compliance and the patient has agreed to proceed with all medical recommendations and follow up plan indicated above. All medications come with benefits and risks. Risks may include permanent injury or death and these risks can be minimized with close reassessment and monitoring. Please make it to your scheduled follow ups with PCP and Sheron

## 2025-02-11 NOTE — PROGRESS NOTES
Discharge orders received.  Patient arrived to the discharge lounge.  PIV removed by discharge RN. Meds to beds medications sent to patient's home pharmacy per patient request, pharmacy notified via phone call.   Instructions given, medications reviewed and general discharge education provided to patient.  Follow up appointments discussed.  Patient verbalized understanding of dc instructions and prescriptions.  Patient signed discharge instructions.  Patient verbalized she had all belongings with her, Denied having any home medications locked in our inpatient pharmacy that  she needs back. Patient ambulated with steady gait from discharge lounge to private vehicle. Patient left via car with sister to home in stable condition.

## 2025-02-11 NOTE — CARE PLAN
The patient is Stable - Low risk of patient condition declining or worsening    Shift Goals  Clinical Goals: Pain control <4/10 throughout the shift, Safety, IVF  Patient Goals: rest  Family Goals: sarahy    Progress made toward(s) clinical / shift goals: Patient alert and oriented, calm and appropriate. Remains on IVF infusing well with no issues. Reports abdominal pain and prn medication with good relief. Patient ambulated to  multiples times with steady and SBA. Patient had multiple episodes of diarrhea earlier the night. Bed to lowest position, call light in reach and bed alarm on. All needs attended.        Patient is not progressing towards the following goals:

## 2025-02-11 NOTE — CARE PLAN
The patient is Stable - Low risk of patient condition declining or worsening    Shift Goals  Clinical Goals: Patient will report pain less than or equal to 4 after PRN pain medication administration  Patient Goals: rest  Family Goals: sarahy    Progress made toward(s) clinical / shift goals:  Pain controlled with PRn medications      Problem: Pain - Standard  Goal: Alleviation of pain or a reduction in pain to the patient’s comfort goal  Description: Target End Date:  Prior to discharge or change in level of care    Document on Vitals flowsheet    1.  Document pain using the appropriate pain scale per order or unit policy  2.  Educate and implement non-pharmacologic comfort measures (i.e. relaxation, distraction, massage, cold/heat therapy, etc.)  3.  Pain management medications as ordered  4.  Reassess pain after pain med administration per policy  5.  If opiods administered assess patient's response to pain medication is appropriate per POSS sedation scale  6.  Follow pain management plan developed in collaboration with patient and interdisciplinary team (including palliative care or pain specialists if applicable)  Outcome: Progressing     Problem: Fall Risk  Goal: Patient will remain free from falls  Description: Target End Date:  Prior to discharge or change in level of care    Document interventions on the Anna [facility] Fall Risk Assessment    1.  Assess for fall risk factors  2.  Implement fall precautions  Outcome: Progressing       Patient is not progressing towards the following goals:

## 2025-02-11 NOTE — PROGRESS NOTES
Discharge order received. Patient dressed and with all belongings. Flu-vaccine offered, patient states she has received a flu shot this season.

## 2025-02-12 LAB
BACTERIA STL CULT: NORMAL
E COLI SXT1+2 STL IA: NORMAL
SIGNIFICANT IND 70042: NORMAL
SITE SITE: NORMAL
SOURCE SOURCE: NORMAL

## 2025-02-12 PROCEDURE — RXMED WILLOW AMBULATORY MEDICATION CHARGE: Performed by: STUDENT IN AN ORGANIZED HEALTH CARE EDUCATION/TRAINING PROGRAM

## 2025-02-12 NOTE — DISCHARGE SUMMARY
Discharge Summary    CHIEF COMPLAINT ON ADMISSION  Chief Complaint   Patient presents with    Abdominal Pain     Pt reports LUQ pain x 3 weeks, seen at  yesterday and was told to come to ER for assessment. +intermittent diarrhea, +n/v with severe pain       Reason for Admission  Diarrhea     Admission Date  2/9/2025    CODE STATUS  Prior    HPI & HOSPITAL COURSE  This is a 59 y.o. female who presented 2/9/2025 with past medical history of GERD with esophagitis, gastric bypass, migraines who presents to the hospital for epigastric abdominal pain has been present for the past 3 weeks.  It is associated with nausea, vomiting and diarrhea.  Patient would have 1-2 bowel movements a day.  Any sort of food or liquid would make the pain worse.  Patient was using 800 mg of ibuprofen twice daily for the past 3 weeks.       CT scan of the abdomen found evidence of gastric bypass and a probable small ulcer in the gastric atrium. She was started with PPI. GI was consulted. She underwent EGD on 2/10 showing Dilated the GJ junction from 13 to 15mm. The EG junction is tortuous, but no real stenosis. No ulcer noted. GI recommends to resume small multiple meals a day and sit straight up or even standing while eating would help. She was given GI cocktail prn for pain.  Patient's abdominal pain has been improving.  She has been tolerating diet.  She is advised to have small multiple meals with sick straight off understanding while eating.    Patient developed YADIRA likely due to NSAID use.  She received IV fluids and renal function has resolved to normal range. Avoid any NSAID discussed with patient.  She is on HCTZ and lisinopril at home for hypertension.  I have discontinued these 2 medications and started amlodipine for hypertension    Patient was noted to have iron deficiency anemia.  She was given 1 dose IV Ferrlecit.  Oral iron supplement prescribed upon discharge.  She is advised to follow-up with GI as outpatient for repeating  colonoscopy.  Her last colonoscopy was 5 years ago.  She voiced understanding.       Therefore, she is discharged in good and stable condition to home with close outpatient follow-up.    The patient met 2-midnight criteria for an inpatient stay at the time of discharge.    Discharge Date  2/11/2025    FOLLOW UP ITEMS POST DISCHARGE  PCP  GI    DISCHARGE DIAGNOSES  Principal Problem:    Acute renal failure (HCC) (POA: Yes)  Active Problems:    Obesity (POA: Yes)    Primary hypertension (POA: Yes)      Overview: Bp poorly controlled today.  Needs refills for lisinopril  and HCTZ    Gastric ulcer (POA: Unknown)    Anemia (POA: Unknown)    Status post gastric bypass for obesity (POA: Unknown)  Resolved Problems:    * No resolved hospital problems. *      FOLLOW UP  Future Appointments   Date Time Provider Department Center   2/14/2025  2:20 PM DEXTER Asencio [facility]     Medical Transportation Management ([facility]) [facility]  [phone]  Call  Call as needed to request transportation to medical appointments or to go to the pharmacy.    Primary Care Provider    Call in 1 week(s)        MEDICATIONS ON DISCHARGE     Medication List        START taking these medications        Instructions   amLODIPine 5 MG Tabs  Start taking on: February 12, 2025  Commonly known as: Norvasc   Take 1 Tablet by mouth every day.  Dose: 5 mg     ferrous sulfate 325 (65 Fe) MG tablet   Take 1 Tablet by mouth every day.  Dose: 325 mg     GI Cocktail (hyoscyamine-lidocaine-Maalox)   Doctor's comments: Ingredients: 34 mL hyoscyamine 0.125 mg/5 mL, 126 mL Maalox, and 40 mL viscous lidocaine 2%.  Take 15 mL by mouth every 6 hours as needed (epigastric pain) for up to 15 days.  Dose: 15 mL            STOP taking these medications      hydroCHLOROthiazide 25 MG Tabs     ibuprofen 200 MG Tabs  Commonly known as: Motrin     lisinopril 20 MG Tabs  Commonly known as: Prinivil              Allergies  Allergies   Allergen Reactions     Neosporin [Neomycin-Bacitracin-Polymyxin] Vomiting and Swelling    Sudafed [Pseudoephedrine Hcl] Rash          Cleocin [Clindamycin Hcl]      Stomach upset    Tape Contact Dermatitis       DIET  No orders of the defined types were placed in this encounter.      ACTIVITY  As tolerated.  Weight bearing as tolerated    CONSULTATIONS  GI    PROCEDURES  S/p EGD 2/10    LABORATORY  Lab Results   Component Value Date    SODIUM 141 02/11/2025    POTASSIUM 4.1 02/11/2025    CHLORIDE 107 02/11/2025    CO2 25 02/11/2025    GLUCOSE 94 02/11/2025    BUN 14 02/11/2025    CREATININE 1.01 02/11/2025    CREATININE 0.93 04/11/2011    GLOMRATE >59 04/19/2010        Lab Results   Component Value Date    WBC 3.5 (L) 02/11/2025    WBC 6.4 04/11/2011    HEMOGLOBIN 8.2 (L) 02/11/2025    HEMATOCRIT 27.3 (L) 02/11/2025    PLATELETCT 197 02/11/2025      CT-ABDOMEN-PELVIS WITH   Final Result      1.  Postoperative changes status post gastric bypass surgery. There is a probable small ulcer in the gastric antrum.   2.  Mild biliary dilatation in this postcholecystectomy patient.   3.  No acute inflammatory abnormality is seen.          Total time of the discharge process 34 minutes.

## 2025-02-13 ENCOUNTER — PHARMACY VISIT (OUTPATIENT)
Dept: PHARMACY | Facility: MEDICAL CENTER | Age: 60
End: 2025-02-13
Payer: COMMERCIAL

## 2025-02-14 ENCOUNTER — OFFICE VISIT (OUTPATIENT)
Dept: MEDICAL GROUP | Facility: MEDICAL CENTER | Age: 60
End: 2025-02-14
Attending: NURSE PRACTITIONER
Payer: MEDICAID

## 2025-02-14 VITALS
OXYGEN SATURATION: 97 % | RESPIRATION RATE: 16 BRPM | HEIGHT: 65 IN | BODY MASS INDEX: 32.77 KG/M2 | HEART RATE: 102 BPM | TEMPERATURE: 96.8 F | WEIGHT: 196.7 LBS | SYSTOLIC BLOOD PRESSURE: 158 MMHG | DIASTOLIC BLOOD PRESSURE: 88 MMHG

## 2025-02-14 DIAGNOSIS — Z12.31 ENCOUNTER FOR SCREENING MAMMOGRAM FOR MALIGNANT NEOPLASM OF BREAST: ICD-10-CM

## 2025-02-14 DIAGNOSIS — Z12.11 COLON CANCER SCREENING: ICD-10-CM

## 2025-02-14 DIAGNOSIS — R94.4 DECREASED GFR: ICD-10-CM

## 2025-02-14 DIAGNOSIS — H91.93 BILATERAL HEARING LOSS, UNSPECIFIED HEARING LOSS TYPE: ICD-10-CM

## 2025-02-14 PROCEDURE — 3077F SYST BP >= 140 MM HG: CPT | Performed by: NURSE PRACTITIONER

## 2025-02-14 PROCEDURE — 3079F DIAST BP 80-89 MM HG: CPT | Performed by: NURSE PRACTITIONER

## 2025-02-14 PROCEDURE — 99214 OFFICE O/P EST MOD 30 MIN: CPT | Performed by: NURSE PRACTITIONER

## 2025-02-14 PROCEDURE — 99213 OFFICE O/P EST LOW 20 MIN: CPT | Performed by: NURSE PRACTITIONER

## 2025-02-14 ASSESSMENT — FIBROSIS 4 INDEX: FIB4 SCORE: 1.96

## 2025-02-15 NOTE — PROGRESS NOTES
Verbal consent was acquired by the patient to use [employer] ambient listening note generation during this visit     Chief Complaint   Patient presents with    Establish Care     220/110 bp at hospital./follow up also for foot/ swelling  and stomach pain for taking to much ibuprofen 400mg.       Subjective:     HPI:   History of Present Illness  The patient presents for evaluation of acute renal failure, hypertension, anemia, hearing loss, and health maintenance.    She was recently hospitalized due to severe pain, initially suspected to be a kidney stone. However, the pain remained localized, prompting a visit to urgent care and subsequent referral to the hospital. Diagnostic tests, including a CT scan and blood work, revealed acute renal failure with a GFR of 27, which improved to 38 and then 64 over the course of her hospital stay. She was advised against the use of ibuprofen, which she had been taking in large quantities (800 mg twice daily) without significant relief. She also experienced severe diarrhea during this period. Her renal function improved upon discontinuation of ibuprofen and adequate hydration. She reports urinating twice today and maintains hydration by consuming five bottles of ice water daily. Prior to her hospital admission, she noted that her urine was clear, but it has since returned to a yellow color.    Her antihypertensive medication was changed to amlodipine due to its renal protective properties. She experienced lightheadedness while on her previous medication, particularly when leaning down at work, but this symptom has not recurred since discontinuing the medication and undergoing the aforementioned procedure. She plans to resume amlodipine today.    She was informed of her anemia and the need for a colonoscopy. Her last colonoscopy was five years ago and did not reveal any abnormalities. She underwent a mammogram approximately one year ago. She has been experiencing hearing loss and  is interested in getting tested.    She underwent a minor surgical procedure on the second day of her hospital stay, during which her esophagus and stomach were dilated by 2 cm due to narrowing. This was identified as a potential source of her pain.    Supplemental Information  She had gastric bypass surgery 25 years ago. She went to the hospital 6 weeks ago because of her blood pressure, which was taken care of. She went to the podiatrist who told her that she can take 4000 mg of ibuprofen a day.    MEDICATIONS  Current: Amlodipine, iron supplement, Tylenol.  Discontinued: Ibuprofen.        No problems updated.    ROS  See HPI     Allergies   Allergen Reactions    Neosporin [Neomycin-Bacitracin-Polymyxin] Vomiting and Swelling    Sudafed [Pseudoephedrine Hcl] Rash          Cleocin [Clindamycin Hcl]      Stomach upset    Tape Contact Dermatitis       Current medicines (including changes today)  Current Outpatient Medications   Medication Sig Dispense Refill    amLODIPine (NORVASC) 5 MG Tab Take 1 Tablet by mouth every day. 100 Tablet 3    ferrous sulfate 325 (65 Fe) MG tablet Take 1 Tablet by mouth every day. 30 Tablet 0    GI cocktail Take 15 mL by mouth every 6 hours as needed (epigastric pain) for up to 15 days. 900 mL 0     No current facility-administered medications for this visit.       Social History     Tobacco Use    Smoking status: Former     Current packs/day: 0.00     Types: Cigarettes     Quit date: 2024     Years since quittin.1    Smokeless tobacco: Never    Tobacco comments:     .  Quit smoking 2022   Vaping Use    Vaping status: Never Used   Substance Use Topics    Alcohol use: Yes     Alcohol/week: 0.5 oz     Types: 1 Standard drinks or equivalent per week     Comment: ocass    Drug use: No       Patient Active Problem List    Diagnosis Date Noted    Status post gastric bypass for obesity 02/10/2025    Acute renal failure (HCC) 2025    Gastric ulcer 2025    Anemia  "02/09/2025    Age-related osteoporosis without current pathological fracture 04/16/2024    Skin infection 04/07/2024    GERD with esophagitis 02/07/2024    Vitamin D deficiency 02/07/2024    DDD (degenerative disc disease), lumbar 02/07/2024    Primary hypertension 02/07/2024    Hyperlipidemia LDL goal <100 02/07/2024    Intractable migraine without aura     Arthritis     Hiatus hernia syndrome     Peripheral neuropathy     Diverticulosis     Anxiety 09/01/2009    Depression 09/01/2009    Obesity 08/03/2009    Low back pain 05/22/2009       Family History   Problem Relation Age of Onset    Cancer Father         ? type of cancer    Diabetes Father     Hypertension Father     Other Father         Migraine    Cancer Maternal Aunt         breast    Heart Disease Maternal Grandmother     Dementia Maternal Grandmother     Heart Disease Maternal Grandfather     Alcohol/Drug Paternal Grandfather           Objective:     BP (!) 158/88 (BP Location: Left arm, Patient Position: Sitting, BP Cuff Size: Adult)   Pulse (!) 102   Temp 36 °C (96.8 °F) (Temporal)   Resp 16   Ht 1.651 m (5' 5\")   Wt 89.2 kg (196 lb 11.2 oz)   SpO2 97%  Body mass index is 32.73 kg/m².    Physical Exam:  Physical Exam  Vitals reviewed.   Constitutional:       General: She is awake.      Appearance: Normal appearance. She is well-developed.   HENT:      Head: Normocephalic.   Eyes:      Conjunctiva/sclera: Conjunctivae normal.   Cardiovascular:      Rate and Rhythm: Normal rate.   Pulmonary:      Effort: Pulmonary effort is normal. No respiratory distress.   Musculoskeletal:      Cervical back: Neck supple.   Skin:     General: Skin is warm and dry.   Neurological:      Mental Status: She is alert and oriented to person, place, and time.   Psychiatric:         Mood and Affect: Mood normal.         Behavior: Behavior normal. Behavior is cooperative.              Assessment and Plan:     The following treatment plan was discussed:    Problem List " Items Addressed This Visit    None  Visit Diagnoses         Colon cancer screening        Relevant Orders    Referral to GI for Colonoscopy      Decreased GFR        Relevant Orders    Comp Metabolic Panel      Encounter for screening mammogram for malignant neoplasm of breast        Relevant Orders    MA-SCREENING MAMMO BILAT W/TOMOSYNTHESIS W/CAD      Bilateral hearing loss, unspecified hearing loss type        Relevant Orders    Referral to Audiology            Assessment & Plan  1. Acute renal failure.  She was recently hospitalized and diagnosed with acute renal failure, with a GFR that fluctuated from 27 to 64. She was advised to stop taking ibuprofen, which she had been using excessively. She is advised to continue drinking plenty of water to maintain hydration and support kidney function. A repeat kidney blood work will be ordered in 1 to 2 weeks to ensure her renal function has normalized. She is advised to avoid ibuprofen and can use Tylenol for pain management.    2. Hypertension.  Her blood pressure was previously well-controlled on her prior medication, but she experienced lightheadedness and was switched to amlodipine. She will start taking amlodipine today. Blood pressure will be monitored to ensure it remains controlled.    3. Anemia.  She was informed that she is anemic and needs a colonoscopy to rule out intestinal bleeding, which is a common cause of anemia in individuals over 50. A referral for a colonoscopy has been made. Her red blood cell counts will be rechecked in 3 months.    4. Hearing loss.  She reported experiencing hearing loss and difficulty hearing conversations at work. A referral for a hearing test has been placed. She will be notified via [facility] to schedule the appointment.    5. Health maintenance.  She is due for a mammogram next month, as her last one was a year ago. The mammogram has been ordered, and she is advised to schedule it anytime after March.    Follow-up  The patient  will follow up in 3 months.    PROCEDURE  The patient underwent a minor surgical procedure during her recent hospital stay, during which her esophagus and stomach were dilated by 2 cm due to narrowing.    Any change or worsening of signs or symptoms, patient encouraged to follow-up or report to emergency room for further evaluation. Patient verbalizes understanding and agrees.      PLEASE NOTE: This dictation was created using voice recognition software. I have made every reasonable attempt to correct obvious errors, but I expect that there are errors of grammar and possibly content that I did not discover before finalizing the note.

## 2025-02-18 ENCOUNTER — HOSPITAL ENCOUNTER (OUTPATIENT)
Dept: LAB | Facility: MEDICAL CENTER | Age: 60
End: 2025-02-18
Attending: NURSE PRACTITIONER
Payer: MEDICAID

## 2025-02-18 DIAGNOSIS — R94.4 DECREASED GFR: ICD-10-CM

## 2025-02-18 LAB
ALBUMIN SERPL BCP-MCNC: 3.9 G/DL (ref 3.2–4.9)
ALBUMIN/GLOB SERPL: 1.7 G/DL
ALP SERPL-CCNC: 129 U/L (ref 30–99)
ALT SERPL-CCNC: 7 U/L (ref 2–50)
ANION GAP SERPL CALC-SCNC: 12 MMOL/L (ref 7–16)
AST SERPL-CCNC: 20 U/L (ref 12–45)
BILIRUB SERPL-MCNC: 0.7 MG/DL (ref 0.1–1.5)
BUN SERPL-MCNC: 12 MG/DL (ref 8–22)
CALCIUM ALBUM COR SERPL-MCNC: 9.5 MG/DL (ref 8.5–10.5)
CALCIUM SERPL-MCNC: 9.4 MG/DL (ref 8.5–10.5)
CHLORIDE SERPL-SCNC: 107 MMOL/L (ref 96–112)
CO2 SERPL-SCNC: 22 MMOL/L (ref 20–33)
CREAT SERPL-MCNC: 0.85 MG/DL (ref 0.5–1.4)
GFR SERPLBLD CREATININE-BSD FMLA CKD-EPI: 78 ML/MIN/1.73 M 2
GLOBULIN SER CALC-MCNC: 2.3 G/DL (ref 1.9–3.5)
GLUCOSE SERPL-MCNC: 84 MG/DL (ref 65–99)
POTASSIUM SERPL-SCNC: 3.9 MMOL/L (ref 3.6–5.5)
PROT SERPL-MCNC: 6.2 G/DL (ref 6–8.2)
SODIUM SERPL-SCNC: 141 MMOL/L (ref 135–145)

## 2025-02-18 PROCEDURE — 80053 COMPREHEN METABOLIC PANEL: CPT

## 2025-02-18 PROCEDURE — 36415 COLL VENOUS BLD VENIPUNCTURE: CPT

## 2025-02-18 NOTE — Clinical Note
REFERRAL APPROVAL NOTICE         Sent on February 18, 2025                   Madhavi Preethi Meghna  [address]  [place] [address]                   Dear Ms. Coffman,    After a careful review of the medical information and benefit coverage, [facility] has processed your referral. See below for additional details.    If applicable, you must be actively enrolled with your insurance for coverage of the authorized service. If you have any questions regarding your coverage, please contact your insurance directly.    REFERRAL INFORMATION   Referral #:  [id number]  Referred-To Provider    Referred-By Provider:  Gastroenterology    DEXTER Asencio   [facility]      [address] [address]  [phone] [address] [facility] [address]  [phone]    Referral Start Date:  02/14/2025  Referral End Date:   02/14/2026             SCHEDULING  If you do not already have an appointment, please call [phone] to make an appointment.     MORE INFORMATION  If you do not already have a [facility] account, sign up at: [email or web address]  You can access your medical information, make appointments, see lab results, billing information, and more.  If you have questions regarding this referral, please contact  the [facility] Referrals department at:             [phone]. Monday - Friday 8:00AM - 5:00PM.     Sincerely,    [facility]

## 2025-02-18 NOTE — Clinical Note
REFERRAL APPROVAL NOTICE         Sent on February 18, 2025                   Madhavi Hookerebony  [address]  [place] [address]                   Dear Ms. Coffman,    After a careful review of the medical information and benefit coverage, [facility] has processed your referral. See below for additional details.    If applicable, you must be actively enrolled with your insurance for coverage of the authorized service. If you have any questions regarding your coverage, please contact your insurance directly.    REFERRAL INFORMATION   Referral #:  [id number]  Referred-To Department    Referred-By Provider:  Audiology    DEXTER Asencio   [facility]      [address]  [place] [id number] [phone] [address]  [place] [id number] [phone]    Referral Start Date:  02/14/2025  Referral End Date:   02/14/2026             SCHEDULING  If you do not already have an appointment, please call [phone] to make an appointment.     MORE INFORMATION  If you do not already have a [employer] account, sign up at: [email or web address]  You can access your medical information, make appointments, see lab results, billing information, and more.  If you have questions regarding this referral, please contact  the [facility] Referrals department at:             [phone]. Monday - Friday 8:00AM - 5:00PM.     Sincerely,    [facility]

## 2025-03-13 ENCOUNTER — OFFICE VISIT (OUTPATIENT)
Dept: AUDIOLOGY | Facility: OTHER | Age: 60
End: 2025-03-13
Payer: MEDICAID

## 2025-03-13 DIAGNOSIS — H90.3 SENSORY HEARING LOSS, BILATERAL: ICD-10-CM

## 2025-03-13 PROCEDURE — 92557 COMPREHENSIVE HEARING TEST: CPT | Mod: GN | Performed by: AUDIOLOGIST

## 2025-03-13 PROCEDURE — 92567 TYMPANOMETRY: CPT | Mod: GN | Performed by: AUDIOLOGIST

## 2025-03-13 NOTE — OP THERAPY EVALUATION
AUDIOLOGY OFFICE VISIT    Case History  Madhavi Coffman is a 59 y.o. female who presents with difficulty hearing in both ears.  She reports particular difficulty hearing in background noise and at work where she often asks colleagues and customers to repeat themselves.  She denies a history of noise exposure but reports a maternal family history of hearing loss.  Other otologic history was unremarkable.         Evaluation  Otoscopy revealed clear external ear canals and visible tympanic membranes in both ears.  Tympanometry revealed normal, Type A, tympanograms in both ears.  This finding is consistent with normal middle ear function.    Behavioral testing revealed a mild to moderate sensorineural hearing loss in both ears. Word recognition scores were good in the left ear and excellent in the right ear.        Recommendations  Trial with binaural amplification.  Annual hearing evaluation to monitor hearing status.

## 2025-04-04 ENCOUNTER — PHARMACY VISIT (OUTPATIENT)
Dept: PHARMACY | Facility: MEDICAL CENTER | Age: 60
End: 2025-04-04
Payer: COMMERCIAL

## 2025-04-04 ENCOUNTER — APPOINTMENT (OUTPATIENT)
Dept: RADIOLOGY | Facility: MEDICAL CENTER | Age: 60
End: 2025-04-04
Attending: EMERGENCY MEDICINE
Payer: MEDICAID

## 2025-04-04 ENCOUNTER — HOSPITAL ENCOUNTER (EMERGENCY)
Facility: MEDICAL CENTER | Age: 60
End: 2025-04-04
Attending: EMERGENCY MEDICINE
Payer: MEDICAID

## 2025-04-04 VITALS
DIASTOLIC BLOOD PRESSURE: 92 MMHG | HEART RATE: 79 BPM | TEMPERATURE: 98.9 F | WEIGHT: 186.73 LBS | SYSTOLIC BLOOD PRESSURE: 167 MMHG | OXYGEN SATURATION: 95 % | BODY MASS INDEX: 31.11 KG/M2 | HEIGHT: 65 IN | RESPIRATION RATE: 20 BRPM

## 2025-04-04 DIAGNOSIS — I82.4Y3 ACUTE DEEP VEIN THROMBOSIS (DVT) OF PROXIMAL VEIN OF BOTH LOWER EXTREMITIES (HCC): ICD-10-CM

## 2025-04-04 LAB
ALBUMIN SERPL BCP-MCNC: 3.9 G/DL (ref 3.2–4.9)
ALBUMIN/GLOB SERPL: 1.6 G/DL
ALP SERPL-CCNC: 206 U/L (ref 30–99)
ALT SERPL-CCNC: 10 U/L (ref 2–50)
ANION GAP SERPL CALC-SCNC: 10 MMOL/L (ref 7–16)
ANISOCYTOSIS BLD QL SMEAR: ABNORMAL
APTT PPP: 23.1 SEC (ref 24.7–36)
AST SERPL-CCNC: 25 U/L (ref 12–45)
BASOPHILS # BLD AUTO: 0.3 % (ref 0–1.8)
BASOPHILS # BLD: 0.02 K/UL (ref 0–0.12)
BILIRUB SERPL-MCNC: 0.8 MG/DL (ref 0.1–1.5)
BUN SERPL-MCNC: 8 MG/DL (ref 8–22)
CALCIUM ALBUM COR SERPL-MCNC: 9.9 MG/DL (ref 8.5–10.5)
CALCIUM SERPL-MCNC: 9.8 MG/DL (ref 8.5–10.5)
CHLORIDE SERPL-SCNC: 106 MMOL/L (ref 96–112)
CO2 SERPL-SCNC: 25 MMOL/L (ref 20–33)
COMMENT 1642: NORMAL
CREAT SERPL-MCNC: 0.62 MG/DL (ref 0.5–1.4)
EKG IMPRESSION: NORMAL
EOSINOPHIL # BLD AUTO: 0.14 K/UL (ref 0–0.51)
EOSINOPHIL NFR BLD: 2.4 % (ref 0–6.9)
ERYTHROCYTE [DISTWIDTH] IN BLOOD BY AUTOMATED COUNT: 74.1 FL (ref 35.9–50)
GFR SERPLBLD CREATININE-BSD FMLA CKD-EPI: 102 ML/MIN/1.73 M 2
GLOBULIN SER CALC-MCNC: 2.5 G/DL (ref 1.9–3.5)
GLUCOSE SERPL-MCNC: 104 MG/DL (ref 65–99)
HCT VFR BLD AUTO: 43.4 % (ref 37–47)
HGB BLD-MCNC: 13.4 G/DL (ref 12–16)
IMM GRANULOCYTES # BLD AUTO: 0.01 K/UL (ref 0–0.11)
IMM GRANULOCYTES NFR BLD AUTO: 0.2 % (ref 0–0.9)
LYMPHOCYTES # BLD AUTO: 1.7 K/UL (ref 1–4.8)
LYMPHOCYTES NFR BLD: 29.7 % (ref 22–41)
MACROCYTES BLD QL SMEAR: ABNORMAL
MCH RBC QN AUTO: 30.5 PG (ref 27–33)
MCHC RBC AUTO-ENTMCNC: 30.9 G/DL (ref 32.2–35.5)
MCV RBC AUTO: 98.9 FL (ref 81.4–97.8)
MICROCYTES BLD QL SMEAR: ABNORMAL
MONOCYTES # BLD AUTO: 0.44 K/UL (ref 0–0.85)
MONOCYTES NFR BLD AUTO: 7.7 % (ref 0–13.4)
MORPHOLOGY BLD-IMP: NORMAL
NEUTROPHILS # BLD AUTO: 3.41 K/UL (ref 1.82–7.42)
NEUTROPHILS NFR BLD: 59.7 % (ref 44–72)
NRBC # BLD AUTO: 0 K/UL
NRBC BLD-RTO: 0 /100 WBC (ref 0–0.2)
NT-PROBNP SERPL IA-MCNC: 698 PG/ML (ref 0–125)
PLATELET # BLD AUTO: 235 K/UL (ref 164–446)
PLATELET BLD QL SMEAR: NORMAL
PMV BLD AUTO: 8.8 FL (ref 9–12.9)
POTASSIUM SERPL-SCNC: 4 MMOL/L (ref 3.6–5.5)
PROT SERPL-MCNC: 6.4 G/DL (ref 6–8.2)
RBC # BLD AUTO: 4.39 M/UL (ref 4.2–5.4)
RBC BLD AUTO: PRESENT
SODIUM SERPL-SCNC: 141 MMOL/L (ref 135–145)
TROPONIN T SERPL-MCNC: 7 NG/L (ref 6–19)
WBC # BLD AUTO: 5.7 K/UL (ref 4.8–10.8)

## 2025-04-04 PROCEDURE — 83880 ASSAY OF NATRIURETIC PEPTIDE: CPT

## 2025-04-04 PROCEDURE — RXMED WILLOW AMBULATORY MEDICATION CHARGE: Performed by: EMERGENCY MEDICINE

## 2025-04-04 PROCEDURE — 80053 COMPREHEN METABOLIC PANEL: CPT

## 2025-04-04 PROCEDURE — 99284 EMERGENCY DEPT VISIT MOD MDM: CPT

## 2025-04-04 PROCEDURE — 85025 COMPLETE CBC W/AUTO DIFF WBC: CPT

## 2025-04-04 PROCEDURE — 85300 ANTITHROMBIN III ACTIVITY: CPT

## 2025-04-04 PROCEDURE — 93005 ELECTROCARDIOGRAM TRACING: CPT | Mod: TC

## 2025-04-04 PROCEDURE — 85306 CLOT INHIBIT PROT S FREE: CPT

## 2025-04-04 PROCEDURE — 36415 COLL VENOUS BLD VENIPUNCTURE: CPT

## 2025-04-04 PROCEDURE — 700102 HCHG RX REV CODE 250 W/ 637 OVERRIDE(OP): Mod: UD | Performed by: EMERGENCY MEDICINE

## 2025-04-04 PROCEDURE — 93970 EXTREMITY STUDY: CPT

## 2025-04-04 PROCEDURE — 85303 CLOT INHIBIT PROT C ACTIVITY: CPT

## 2025-04-04 PROCEDURE — 85730 THROMBOPLASTIN TIME PARTIAL: CPT

## 2025-04-04 PROCEDURE — 84484 ASSAY OF TROPONIN QUANT: CPT

## 2025-04-04 PROCEDURE — A9270 NON-COVERED ITEM OR SERVICE: HCPCS | Mod: UD | Performed by: EMERGENCY MEDICINE

## 2025-04-04 PROCEDURE — 81240 F2 GENE: CPT

## 2025-04-04 PROCEDURE — 93005 ELECTROCARDIOGRAM TRACING: CPT | Mod: TC | Performed by: EMERGENCY MEDICINE

## 2025-04-04 PROCEDURE — 71045 X-RAY EXAM CHEST 1 VIEW: CPT

## 2025-04-04 PROCEDURE — 81241 F5 GENE: CPT

## 2025-04-04 PROCEDURE — 93970 EXTREMITY STUDY: CPT | Mod: 26 | Performed by: INTERNAL MEDICINE

## 2025-04-04 RX ORDER — AMLODIPINE BESYLATE 10 MG/1
10 TABLET ORAL DAILY
Qty: 90 TABLET | Refills: 0 | Status: SHIPPED | OUTPATIENT
Start: 2025-04-04 | End: 2025-04-11

## 2025-04-04 RX ORDER — CLONIDINE HYDROCHLORIDE 0.1 MG/1
0.1 TABLET ORAL ONCE
Status: COMPLETED | OUTPATIENT
Start: 2025-04-04 | End: 2025-04-04

## 2025-04-04 RX ADMIN — CLONIDINE HYDROCHLORIDE 0.1 MG: 0.1 TABLET ORAL at 16:03

## 2025-04-04 RX ADMIN — RIVAROXABAN 15 MG: 15 TABLET, FILM COATED ORAL at 17:59

## 2025-04-04 ASSESSMENT — LIFESTYLE VARIABLES
DO YOU DRINK ALCOHOL: NO
EVER HAD A DRINK FIRST THING IN THE MORNING TO STEADY YOUR NERVES TO GET RID OF A HANGOVER: NO
HAVE PEOPLE ANNOYED YOU BY CRITICIZING YOUR DRINKING: NO
HAVE YOU EVER FELT YOU SHOULD CUT DOWN ON YOUR DRINKING: NO
TOTAL SCORE: 0
CONSUMPTION TOTAL: INCOMPLETE
TOTAL SCORE: 0
TOTAL SCORE: 0
EVER FELT BAD OR GUILTY ABOUT YOUR DRINKING: NO

## 2025-04-04 ASSESSMENT — FIBROSIS 4 INDEX: FIB4 SCORE: 2.3

## 2025-04-04 NOTE — ED PROVIDER NOTES
ED PHYSICIAN NOTE    CHIEF COMPLAINT  Chief Complaint   Patient presents with    Blood Pressure Problem     Presents for high BP despite compliance with medication. Denies headache, CP, SOB. Recent change of BP from lisinopril and HCTZ to amlodipine.     Peripheral Edema     Reports swelling to left leg, which patient reports is typically associated with high BP. Denies redness or warmth to LLE.        EXTERNAL RECORDS REVIEWED  Inpatient Notes: Patient admitted to hospital in February with acute kidney injury related to dehydration and NSAID use.  Medications were changed.    HPI/ROS      Madhavirobina Coffman is a 60 y.o. female who presents to the emergency department with left leg swelling and discomfort.  Started 3 days ago.  Gradually worsening.  Seems to be worse when she stands on her feet all day.  In the past she has had swelling in her legs related to elevated blood pressure.  She took her blood pressure and it is also elevated.  Patient has a history of hypertension.  Her medications were switched from lisinopril and hydrochlorothiazide to amlodipine in February.  She reports has been compliant with this medication.  She denies chest pain, shortness of breath orthopnea PND.  No fevers or recent illness.  No skin rash.    PAST MEDICAL HISTORY  Past Medical History:   Diagnosis Date    Anemia     Anxiety     Arthritis     back, osteoarthritis    DDD (degenerative disc disease), lumbar 02/07/2024    Depression     Diverticulosis     GERD with esophagitis     Hiatus hernia syndrome     had surgical repair    Hyperlipidemia LDL goal <100 02/07/2024    LBP (low back pain) 09/07/2012    8/10    Migraine without aura, with intractable migraine, so stated, without mention of status migrainosus     Obesity     Peripheral neuropathy     Primary hypertension 02/07/2024    Bp poorly controlled today.  Needs refills for lisinopril  and HCTZ    Thoracic or lumbosacral neuritis or radiculitis, unspecified     bilateral  "L4-S1 radiculopathies    Vitamin D deficiency 2024       SOCIAL HISTORY  Social History     Tobacco Use    Smoking status: Former     Current packs/day: 0.00     Types: Cigarettes     Quit date: 2024     Years since quittin.2    Smokeless tobacco: Never    Tobacco comments:     .  Quit smoking 2022   Vaping Use    Vaping status: Never Used   Substance Use Topics    Alcohol use: Yes     Alcohol/week: 0.5 oz     Types: 1 Standard drinks or equivalent per week     Comment: ocass    Drug use: No       CURRENT MEDICATIONS  Home Medications       Reviewed by Marita Reilly R.N. (Registered Nurse) on 25 at 1439  Med List Status: Partial     Medication Last Dose Status   amLODIPine (NORVASC) 5 MG Tab 2025 Active   ferrous sulfate 325 (65 Fe) MG tablet  Active                  Audit from Redirected Encounters    **Home medications have not yet been reviewed for this encounter**         ALLERGIES  Allergies   Allergen Reactions    Neosporin [Neomycin-Bacitracin-Polymyxin] Vomiting and Swelling    Sudafed [Pseudoephedrine Hcl] Rash          Cleocin [Clindamycin Hcl]      Stomach upset    Tape Contact Dermatitis       PHYSICAL EXAM  VITAL SIGNS: BP (!) 178/104   Pulse 81   Temp 37.2 °C (98.9 °F) (Temporal)   Resp 20   Ht 1.651 m (5' 5\")   Wt 84.7 kg (186 lb 11.7 oz)   LMP 1999   SpO2 95%   BMI 31.07 kg/m²    Constitutional: Awake and alert  HENT: Normal inspection  Eyes: Normal inspection  Neck: Grossly normal range of motion.  Cardiovascular: Normal heart rate, systolic ejection murmur.  Thorax & Lungs: No respiratory distress, No wheezing, No rales, No rhonchi, No chest tenderness.   Abdomen: Bowel sounds normal, soft, non-distended, nontender, no mass  Skin: No rash.  Back: No tenderness, No CVA tenderness.   Extremities: Asymmetric swelling of the left leg when compared to the right.  There is tenderness in the popliteal fossa on the left.  No Homans or cords palpated.  Good " pulses at the dorsalis pedis bilaterally.  Neurologic: Normal sensory and motor of the lower extremities  Psychiatric: Normal for situation     DIAGNOSTIC STUDIES / PROCEDURES  LABS/EKG  Results for orders placed or performed during the hospital encounter of 04/04/25   CBC WITH DIFFERENTIAL    Collection Time: 04/04/25  3:38 PM   Result Value Ref Range    WBC 5.7 4.8 - 10.8 K/uL    RBC 4.39 4.20 - 5.40 M/uL    Hemoglobin 13.4 12.0 - 16.0 g/dL    Hematocrit 43.4 37.0 - 47.0 %    MCV 98.9 (H) 81.4 - 97.8 fL    MCH 30.5 27.0 - 33.0 pg    MCHC 30.9 (L) 32.2 - 35.5 g/dL    RDW 74.1 (H) 35.9 - 50.0 fL    Platelet Count 235 164 - 446 K/uL    MPV 8.8 (L) 9.0 - 12.9 fL    Neutrophils-Polys 59.70 44.00 - 72.00 %    Lymphocytes 29.70 22.00 - 41.00 %    Monocytes 7.70 0.00 - 13.40 %    Eosinophils 2.40 0.00 - 6.90 %    Basophils 0.30 0.00 - 1.80 %    Immature Granulocytes 0.20 0.00 - 0.90 %    Nucleated RBC 0.00 0.00 - 0.20 /100 WBC    Neutrophils (Absolute) 3.41 1.82 - 7.42 K/uL    Lymphs (Absolute) 1.70 1.00 - 4.80 K/uL    Monos (Absolute) 0.44 0.00 - 0.85 K/uL    Eos (Absolute) 0.14 0.00 - 0.51 K/uL    Baso (Absolute) 0.02 0.00 - 0.12 K/uL    Immature Granulocytes (abs) 0.01 0.00 - 0.11 K/uL    NRBC (Absolute) 0.00 K/uL    Anisocytosis 1+     Macrocytosis 1+     Microcytosis 1+    COMP METABOLIC PANEL    Collection Time: 04/04/25  3:38 PM   Result Value Ref Range    Sodium 141 135 - 145 mmol/L    Potassium 4.0 3.6 - 5.5 mmol/L    Chloride 106 96 - 112 mmol/L    Co2 25 20 - 33 mmol/L    Anion Gap 10.0 7.0 - 16.0    Glucose 104 (H) 65 - 99 mg/dL    Bun 8 8 - 22 mg/dL    Creatinine 0.62 0.50 - 1.40 mg/dL    Calcium 9.8 8.5 - 10.5 mg/dL    Correct Calcium 9.9 8.5 - 10.5 mg/dL    AST(SGOT) 25 12 - 45 U/L    ALT(SGPT) 10 2 - 50 U/L    Alkaline Phosphatase 206 (H) 30 - 99 U/L    Total Bilirubin 0.8 0.1 - 1.5 mg/dL    Albumin 3.9 3.2 - 4.9 g/dL    Total Protein 6.4 6.0 - 8.2 g/dL    Globulin 2.5 1.9 - 3.5 g/dL    A-G Ratio 1.6 g/dL    TROPONIN    Collection Time: 25  3:38 PM   Result Value Ref Range    Troponin T 7 6 - 19 ng/L   proBrain Natriuretic Peptide, NT    Collection Time: 25  3:38 PM   Result Value Ref Range    NT-proBNP 698 (H) 0 - 125 pg/mL   PLATELET ESTIMATE    Collection Time: 25  3:38 PM   Result Value Ref Range    Plt Estimation Normal    MORPHOLOGY    Collection Time: 25  3:38 PM   Result Value Ref Range    RBC Morphology Present    PERIPHERAL SMEAR REVIEW    Collection Time: 25  3:38 PM   Result Value Ref Range    Peripheral Smear Review see below    DIFFERENTIAL COMMENT    Collection Time: 25  3:38 PM   Result Value Ref Range    Comments-Diff see below    ESTIMATED GFR    Collection Time: 25  3:38 PM   Result Value Ref Range    GFR (CKD-EPI) 102 >60 mL/min/1.73 m 2   EKG    Collection Time: 25  4:08 PM   Result Value Ref Range    Report       [facility] Emergency Dept.    Test Date:  2025  Pt Name:    SINGH DUARTE               Department: ER  MRN:        [id number]                      Room:  Gender:     Female                       Technician: [id number]  :        1965                   Requested By:ER TRIAGE PROTOCOL  Order #:    [id number]                    Reading MD: TUAN LAWS MD    Measurements  Intervals                                Axis  Rate:       86                           P:          41  TX:         129                          QRS:        74  QRSD:       119                          T:          56  QT:         413  QTc:        494    Interpretive Statements  Sinus rhythm  Nonspecific intraventricular conduction delay  Minimal ST depression, inferior leads  Baseline wander in lead(s) V3,V4  Compared to ECG 2025 17:26:59  Intraventricular conduction delay now present  ST (T wave) deviation now present  Prolonged QT interval no longer present  Electronically Signed On  2025 16:08:55 PDT by TUAN LAWS MD         I have independently interpreted this EKG as documented above        RADIOLOGY  US-EXTREMITY VENOUS LOWER BILAT   Final Result      DX-CHEST-PORTABLE (1 VIEW)   Final Result      1.  No acute cardiac or pulmonary abnormalities are identified.            COURSE & MEDICAL DECISION MAKING    INITIAL ASSESSMENT, COURSE AND PLAN  Case narrative: Patient presents with swelling of her left leg.  She is also hypertensive.  She has no chest pain or shortness of breath.  No orthopnea PND.  Vital signs were stable.  Ordered laboratory data.  Will rule out DVT.  Will treat blood pressure with clonidine.    Blood pressure slightly improved with clonidine.    Laboratory data returned unremarkable.  EKG with nonspecific changes.    Venous duplex demonstrated DVT.  Patient was hospitalized about 2 months ago and did undergo surgery at that time.  Perhaps DVT is related to this.  Given this was about 2 months ago I did order thrombophilia panel this is pending.    Patient is hemodynamically stable.  No chest pain shortness of breath.  Patient is appropriate for outpatient management.  She is given first dose of Xarelto in the emergency department.  I have given her a prescription for the first 3-week loading course of Xarelto and referred to the anticoagulation clinic discussed with  to ensure patient can get medication.  Discussed risk benefits of these medications and she voiced understanding.  As patient remains hypertensive I will increase the patient's amlodipine dose.  She will keep a blood pressure log.  Patient will need to follow-up with her regular doctor for recheck and perhaps additional testing.  Patient was precaution return to the ER for difficulty breathing, chest pain, shortness of breath or concern          Interventions  Medications   cloNIDine (Catapres) tablet 0.1 mg (0.1 mg Oral Given 4/4/25 1604)   rivaroxaban (Xarelto) tablet 15 mg (15 mg Oral Given 4/4/25 9854)       DISPOSITION AND  DISCUSSIONS    Escalation of care considered, and ultimately not performed:acute inpatient care management, however at this time, the patient is most appropriate for outpatient management    Prescription drugs considered and/or prescribed:     Prescribed   New Prescriptions    AMLODIPINE (NORVASC) 10 MG TAB    Take 1 Tablet by mouth every day for 90 days.    RIVAROXABAN (XARELTO) 15 MG TAB TABLET    Take 1 Tablet by mouth 2 times a day for 21 days.     Follow up  Elizabeth Luke, A.P.R.N.  [address] [address]  [phone]          FINAL IMPRESSION  1.  Acute bilateral lower extremity deep vein thrombosis  2.  Hypertension    This dictation was created using voice recognition software. The accuracy of the dictation is limited to the abilities of the software. I expect there may be some errors of grammar and possibly content. The nursing notes were reviewed and certain aspects of this information were incorporated into this note.    Electronically signed by: Pedrito Zee M.D., 4/4/2025

## 2025-04-04 NOTE — ED TRIAGE NOTES
Chief Complaint   Patient presents with    Blood Pressure Problem     Presents for high BP despite compliance with medication. Denies headache, CP, SOB. Recent change of BP from lisinopril and HCTZ to amlodipine.     Peripheral Edema     Reports swelling to left leg, which patient reports is typically associated with high BP. Denies redness or warmth to LLE.      Pt informed of wait times. Educated on triage process. Asked to return to triage RN for any new or worsening of symptoms. Thanked for patience.

## 2025-04-05 NOTE — DISCHARGE INSTRUCTIONS
Please return to the ER for difficulty breathing, chest pain, worsening swelling, passing out or other concerning symptoms.    Do not take NSAIDs or aspirin with your anticoagulant.

## 2025-04-05 NOTE — ED NOTES
Provided prescription to pt with new med education. Pt verbalized understanding of education. Pt ambulatory out of ED with steady gait.

## 2025-04-05 NOTE — ED NOTES
Pt discharge home. Pt given discharge instructions and worknote. Pt verbalized understanding, all questions answered ,vss upon d/c.   Pt waiting for medication from pharmacy

## 2025-04-07 ENCOUNTER — TELEPHONE (OUTPATIENT)
Dept: VASCULAR LAB | Facility: MEDICAL CENTER | Age: 60
End: 2025-04-07
Payer: MEDICAID

## 2025-04-07 LAB
AT III ACT/NOR PPP CHRO: 110 % (ref 76–128)
PROT C ACT/NOR PPP: 95 % (ref 83–168)
PROT S ACT/NOR PPP: 123 % (ref 57–131)

## 2025-04-07 NOTE — TELEPHONE ENCOUNTER
[facility]     Received anticoagulation referral from ED discharge team on 4-4-25.     Called patient to schedule an appt to establish care. No answer. LVM.    1st attempt     Insurance: [employer]  PCP: [facility]  Locations to be seen: [facility]    If no response by 5-7-25 OR 2 no shows/cancellations, will remove from referral list    Homero Bledsoe, PharmD, BCACP  [facility]  Phone [phone]

## 2025-04-09 ENCOUNTER — ANTICOAGULATION VISIT (OUTPATIENT)
Dept: VASCULAR LAB | Facility: MEDICAL CENTER | Age: 60
End: 2025-04-09
Attending: INTERNAL MEDICINE
Payer: MEDICAID

## 2025-04-09 ENCOUNTER — DOCUMENTATION (OUTPATIENT)
Dept: VASCULAR LAB | Facility: MEDICAL CENTER | Age: 60
End: 2025-04-09

## 2025-04-09 DIAGNOSIS — I82.433 ACUTE DEEP VEIN THROMBOSIS (DVT) OF POPLITEAL VEIN OF BOTH LOWER EXTREMITIES (HCC): ICD-10-CM

## 2025-04-09 PROBLEM — I82.409 DEEP VEIN THROMBOSIS (HCC): Status: ACTIVE | Noted: 2025-04-09

## 2025-04-09 LAB
F2 C.20210G>A GENO BLD/T: NEGATIVE
F5 P.R506Q BLD/T QL: NEGATIVE

## 2025-04-09 PROCEDURE — 99213 OFFICE O/P EST LOW 20 MIN: CPT | Performed by: PHARMACIST

## 2025-04-09 NOTE — PROGRESS NOTES
NEW DOAC   .  Anticoagulation Summary  As of 4/9/2025      INR goal:  --   TTR:  --   INR used for dosing:  No new INR was available at the time of this encounter.   Warfarin maintenance plan:  No maintenance plan   Next INR check:  4/30/2025   Target end date:  10/4/2025    Indications    Deep vein thrombosis (HCC) [I82.409]                 Anticoagulation Episode Summary       INR check location:  --    Preferred lab:  --    Send INR reminders to:  --    Comments:  --          Anticoagulation Care Providers       Provider Role Specialty Phone number    Pedrito Zee M.D. Referring Emergency Medicine [phone]    [facility]ticoagulation [facility] Responsible  [phone]          Anticoagulation Patient Findings      PCP: DEXTER Asencio  Cardiologist: None  Dx: Acute DVT  CHADSVASC = n/a  HAS-BLED = 0  Target End Date = referring provider recommending 6 months    Pt Hx:   Patient presented to ED on 4-4-25 with several days of worsening swelling and discomfort to LLE.  Was found to have DVT in LLE and superficial clot on RLE found incidentally per imaging notes below.    FINDINGS:  Right lower extremity:  Non-occlusive, acute to subacute thrombus visualized in the superficial femoral junction extending into the common femoral vein only at the junction.  All other veins demonstrate complete color filling and compressibility with normal venous flow dynamics including spontaneous flow and respiratory phasicity.     Left lower extremity:  Occlusive, acute to subacute thrombus visualized throughout the popliteal vein into the gastrocnemius veins into the mid calf.   All other veins demonstrate complete color filling and compressibility with normal venous flow dynamics including spontaneous flow and respiratory phasicity.    Patient was admitted on 2-10-25 for abdominal pain and YADIRA, denies any s/s VTE immediately following that event.  Denies any personal hx of VTE, does have sister that had provoked DVT  many years ago.    Hypercoag panel ordered by ED physician, not all panels have resulted at this time, but thus that have are unremarkable.    Labs:  Lab Results   Component Value Date/Time    WBC 5.7 04/04/2025 03:38 PM    WBC 6.4 04/11/2011 08:15 AM    RBC 4.39 04/04/2025 03:38 PM    RBC 4.63 04/11/2011 08:15 AM    HEMOGLOBIN 13.4 04/04/2025 03:38 PM    HEMATOCRIT 43.4 04/04/2025 03:38 PM    MCV 98.9 (H) 04/04/2025 03:38 PM    MCV 94 04/11/2011 08:15 AM    MCH 30.5 04/04/2025 03:38 PM    MCH 31.1 04/11/2011 08:15 AM    MCHC 30.9 (L) 04/04/2025 03:38 PM    MPV 8.8 (L) 04/04/2025 03:38 PM    NEUTSPOLYS 59.70 04/04/2025 03:38 PM    LYMPHOCYTES 29.70 04/04/2025 03:38 PM    MONOCYTES 7.70 04/04/2025 03:38 PM    EOSINOPHILS 2.40 04/04/2025 03:38 PM    BASOPHILS 0.30 04/04/2025 03:38 PM    HYPOCHROMIA 1+ 02/10/2025 02:21 AM    ANISOCYTOSIS 1+ 04/04/2025 03:38 PM      Lab Results   Component Value Date/Time    SODIUM 141 04/04/2025 03:38 PM    POTASSIUM 4.0 04/04/2025 03:38 PM    CHLORIDE 106 04/04/2025 03:38 PM    CO2 25 04/04/2025 03:38 PM    GLUCOSE 104 (H) 04/04/2025 03:38 PM    BUN 8 04/04/2025 03:38 PM    CREATININE 0.62 04/04/2025 03:38 PM    CREATININE 0.93 04/11/2011 08:15 AM    BUNCREATRAT 22 05/17/2014 07:50 AM    BUNCREATRAT 14 04/11/2011 08:15 AM    GLOMRATE >59 04/19/2010 08:10 AM        Pt is new to rivaroxaban (Xarelto) and new to [facility]. Discussed:   Indication for DOAC therapy.  Importance of monitoring and compliance.   Monitoring parameters, signs and symptoms of bleeding or clotting.  DOAC therapy, side effects, potential DDIs, OTC medications  Hormonal therapy: No  Pregnancy: No  DDI: No  Pt is not on antiplatelet/NSAID therapy.   Lifestyle safety, ie smoking, ETOH, hobby safety, fall safety/prevention  Procedures for missed doses or suspected missed doses, surgeries/procedures, travel, dental work, any medication changes    Continue Xarelto 15mg BID X 21 days, then transition to Xarelto 20mg QD  thereafter (prescription sent to preferred pharmacy).    DOAC affordable: yes    Samples provided: no    Labs to be completed prior to next f/u - CBC, CMP    F/U: 3 week(s)    Homero Bledsoe, PharmD, BCACP    CC Michael Bloch, M.D.    [facility] Pharmacotherapy Program Consent                                             Name    Madhavi Coffman    MRN number: [id number]    the following are guidelines for participation in the [facility]otherapy [facility].     I, ____Madhavi Coffman_____, understand and voluntarily agree to participate in the [facility] and to have services provided to me by pharmacists working in collaboration with my provider.    I understand the pharmacist within the [facility] Program may initiate, modify or discontinue my medications, order appropriate testing and appointments, perform exams, monitor treatment, and make clinical evaluations and decisions pursuant to a collaborative practice agreement with my provider.  I understand the pharmacist within the [facility] is not a physician, osteopathic physician, advanced practice registered nurse or physician assistant and may not diagnose.  I will take all my medications as instructed and not change the way I take it without first talking to my provider or a pharmacist within the [facility] Program.  I understand that if I am late to my appointment I may not be able to be seen by a pharmacist at that time and will have to reschedule my appointment.  During appointment with pharmacist I understand that pharmacist has the right not to answer questions or perform services outside the pharmacist’s scope of practice.  By signing below, I provide informed consent for the pharmacist to provide these services and for my participation in the [facility] Program.      Madhavi Coffman           [id number]          04/09/25  Patient  Name                   MRN number  Date     ____Obtained verbal consent from Madhavi Coffman

## 2025-04-09 NOTE — PROGRESS NOTES
Initial anticoag note and most recent ED note reviewed.   Patient started on oac for dvt, possibly provoked by hospitalizatino for medical illness 1-2 months prior    Pending further recs, we will continue with 6 mo of oral oac and then check back with pcp to see whether or not to consider extended oac or further w/u.    Will defer any indicated age appropriate screening for occult malignancy to pcp.    Michael Bloch, MD  Anticoagulation Clinic    Cc:  C Ti

## 2025-04-11 ENCOUNTER — OFFICE VISIT (OUTPATIENT)
Dept: MEDICAL GROUP | Facility: MEDICAL CENTER | Age: 60
End: 2025-04-11
Attending: NURSE PRACTITIONER
Payer: MEDICAID

## 2025-04-11 VITALS
TEMPERATURE: 96.8 F | RESPIRATION RATE: 16 BRPM | HEART RATE: 85 BPM | DIASTOLIC BLOOD PRESSURE: 60 MMHG | SYSTOLIC BLOOD PRESSURE: 110 MMHG

## 2025-04-11 DIAGNOSIS — R53.83 OTHER FATIGUE: ICD-10-CM

## 2025-04-11 DIAGNOSIS — I10 HYPERTENSION, UNSPECIFIED TYPE: ICD-10-CM

## 2025-04-11 DIAGNOSIS — R25.2 JERKING MOVEMENTS OF EXTREMITIES: ICD-10-CM

## 2025-04-11 DIAGNOSIS — I82.4Y3 ACUTE DEEP VEIN THROMBOSIS (DVT) OF PROXIMAL VEIN OF BOTH LOWER EXTREMITIES (HCC): ICD-10-CM

## 2025-04-11 PROCEDURE — 3074F SYST BP LT 130 MM HG: CPT | Performed by: NURSE PRACTITIONER

## 2025-04-11 PROCEDURE — 99212 OFFICE O/P EST SF 10 MIN: CPT | Performed by: NURSE PRACTITIONER

## 2025-04-11 PROCEDURE — 3078F DIAST BP <80 MM HG: CPT | Performed by: NURSE PRACTITIONER

## 2025-04-11 PROCEDURE — 99214 OFFICE O/P EST MOD 30 MIN: CPT | Performed by: NURSE PRACTITIONER

## 2025-04-11 RX ORDER — AMLODIPINE BESYLATE 5 MG/1
5 TABLET ORAL DAILY
Qty: 90 TABLET | Refills: 0 | Status: SHIPPED | OUTPATIENT
Start: 2025-04-11 | End: 2025-07-10

## 2025-04-11 RX ORDER — LOSARTAN POTASSIUM 50 MG/1
50 TABLET ORAL DAILY
Qty: 100 TABLET | Refills: 3 | Status: SHIPPED | OUTPATIENT
Start: 2025-04-11 | End: 2026-05-16

## 2025-04-11 RX ORDER — CYCLOBENZAPRINE HCL 5 MG
5 TABLET ORAL 3 TIMES DAILY PRN
Qty: 30 TABLET | Refills: 0 | Status: SHIPPED | OUTPATIENT
Start: 2025-04-11 | End: 2025-04-30 | Stop reason: SDUPTHER

## 2025-04-16 NOTE — PROGRESS NOTES
"Verbal consent was acquired by the patient to use [employer] ambient listening note generation during this visit     Chief Complaint   Patient presents with    Hospital Follow-up     Bp pressure, swelling on legs.       Subjective:     HPI:   History of Present Illness  The patient is a 60-year-old female who presents for evaluation of deep vein thrombosis, hypertension, and sleep apnea. She is accompanied by her sister.    She has been experiencing persistent leg swelling, particularly in the left leg, which she attributes to her prolonged standing at work. She has been diagnosed with superficial blood clots in her right leg and deep vein thrombosis (DVT) in her left leg. The pain in her left leg is severe, described as akin to \"little bursts of fire.\" Despite advice from the anticoagulation clinic to apply heat, she found no relief. She has been prescribed a 21-day course of medication, followed by a 6-month regimen of 20 mg once daily. She has a history of falls and tripping incidents. She has been using Salonpas patches for pain relief. She has tried Tylenol without success and has a history of adverse reactions to gabapentin and Lyrica. She has undergone six back surgeries and found Flexeril beneficial in the past.    She expresses concern about her blood pressure, initially suspecting it to be the cause of her leg swelling. Her blood pressure readings have been inconsistent, ranging from 160/110 to 139/90 upon waking. She monitors her blood pressure at home, noting that it is usually elevated in the morning but normalizes by evening. She takes her medications at 4:30 AM and 4:30 PM. She is currently on amlodipine 10 mg, which she believes may be causing diarrhea. She also takes Xarelto twice daily.    She reports an episode of leg jerking during sleep, which she initially thought was a seizure. This was followed by a sensation of falling whenever she attempted to sleep for the next two hours. She does not " report snoring but admits to frequent awakenings at night. She has a past diagnosis of sleep apnea in her 20s, which had improved over time.    Supplemental Information  She has received a notification to schedule a colonoscopy.    FAMILY HISTORY  She has a sister who had a lung clot.    ALLERGIES  The patient has had side effects from GABAPENTIN and LYRICA.    MEDICATIONS  Current: amlodipine, Xarelto, Tylenol  Past: gabapentin, Lyrica, Flexeril        No problems updated.    ROS  See HPI     Allergies   Allergen Reactions    Neosporin [Neomycin-Bacitracin-Polymyxin] Vomiting and Swelling    Sudafed [Pseudoephedrine Hcl] Rash          Cleocin [Clindamycin Hcl]      Stomach upset    Tape Contact Dermatitis       Current medicines (including changes today)  Current Outpatient Medications   Medication Sig Dispense Refill    cyclobenzaprine (FLEXERIL) 5 mg tablet Take 1 Tablet by mouth 3 times a day as needed for Muscle Spasms or Moderate Pain. 30 Tablet 0    losartan (COZAAR) 50 MG Tab Take 1 Tablet by mouth every day. 100 Tablet 3    amLODIPine (NORVASC) 5 MG Tab Take 1 Tablet by mouth every day for 90 days. 90 Tablet 0    rivaroxaban (XARELTO) 20 MG Tab tablet Take 1 Tablet by mouth with dinner. 30 Tablet 6    rivaroxaban (XARELTO) 15 MG Tab tablet Take 1 Tablet by mouth 2 times a day for 21 days. 42 Tablet 0    ferrous sulfate 325 (65 Fe) MG tablet Take 1 Tablet by mouth every day. 30 Tablet 0     No current facility-administered medications for this visit.       Social History     Tobacco Use    Smoking status: Former     Current packs/day: 0.00     Types: Cigarettes     Quit date: 2024     Years since quittin.2    Smokeless tobacco: Never    Tobacco comments:     .  Quit smoking 2022   Vaping Use    Vaping status: Never Used   Substance Use Topics    Alcohol use: Yes     Alcohol/week: 0.5 oz     Types: 1 Standard drinks or equivalent per week     Comment: ocass    Drug use: No       Patient  Active Problem List    Diagnosis Date Noted    Deep vein thrombosis (HCC) 04/09/2025    Status post gastric bypass for obesity 02/10/2025    Acute renal failure (HCC) 02/09/2025    Gastric ulcer 02/09/2025    Anemia 02/09/2025    Age-related osteoporosis without current pathological fracture 04/16/2024    Skin infection 04/07/2024    GERD with esophagitis 02/07/2024    Vitamin D deficiency 02/07/2024    DDD (degenerative disc disease), lumbar 02/07/2024    Primary hypertension 02/07/2024    Hyperlipidemia LDL goal <100 02/07/2024    Intractable migraine without aura     Arthritis     Hiatus hernia syndrome     Peripheral neuropathy     Diverticulosis     Anxiety 09/01/2009    Depression 09/01/2009    Obesity 08/03/2009    Low back pain 05/22/2009       Family History   Problem Relation Age of Onset    Cancer Father         ? type of cancer    Diabetes Father     Hypertension Father     Other Father         Migraine    Cancer Maternal Aunt         breast    Heart Disease Maternal Grandmother     Dementia Maternal Grandmother     Heart Disease Maternal Grandfather     Alcohol/Drug Paternal Grandfather           Objective:     /60 (BP Location: Left arm, Patient Position: Sitting, BP Cuff Size: Adult)   Pulse 85   Temp 36 °C (96.8 °F) (Temporal)   Resp 16  There is no height or weight on file to calculate BMI.    Physical Exam:  Physical Exam  Vitals reviewed.   Constitutional:       General: She is awake.      Appearance: Normal appearance. She is well-developed.   HENT:      Head: Normocephalic.   Eyes:      Conjunctiva/sclera: Conjunctivae normal.   Cardiovascular:      Rate and Rhythm: Normal rate.   Pulmonary:      Effort: Pulmonary effort is normal. No respiratory distress.   Musculoskeletal:      Cervical back: Neck supple.      Left lower leg: Swelling and tenderness present.   Skin:     General: Skin is warm and dry.   Neurological:      Mental Status: She is alert and oriented to person, place, and  time.   Psychiatric:         Mood and Affect: Mood normal.         Behavior: Behavior normal. Behavior is cooperative.              Assessment and Plan:     The following treatment plan was discussed:    Problem List Items Addressed This Visit       Deep vein thrombosis (HCC)    Relevant Medications    cyclobenzaprine (FLEXERIL) 5 mg tablet    losartan (COZAAR) 50 MG Tab    amLODIPine (NORVASC) 5 MG Tab     Other Visit Diagnoses         Hypertension, unspecified type        Relevant Medications    losartan (COZAAR) 50 MG Tab    amLODIPine (NORVASC) 5 MG Tab      Jerking movements of extremities        Relevant Orders    Referral to Pulmonary and Sleep Medicine      Other fatigue        Relevant Orders    Referral to Pulmonary and Sleep Medicine            Assessment & Plan  1. Deep Vein Thrombosis (DVT).  The etiology of the DVT remains uncertain, pending the results of a thrombotic workup. She will continue anticoagulant therapy for a minimum of 6 months to prevent clot enlargement and new clot formation. A muscle relaxant, specifically Flexeril, will be prescribed for pain management. She is advised to apply lotion to maintain skin flexibility, take breaks during work to elevate her feet, and use a cold washcloth for comfort. The use of Salonpas patches is permitted. In the event of a fall resulting in significant bruising or pain, she should seek immediate medical attention at the ER for an ultrasound to rule out internal bleeding secondary to blood thinning.    2. Hypertension.  Her GFR levels have normalized. The dosage of amlodipine will be reduced to 5 mg, with the aim of eventual discontinuation. Losartan 50 mg will be introduced into her regimen. She is advised to monitor her blood pressure regularly and take losartan in the morning and amlodipine at night. We will re-evaluate in 1 month to make further adjustments.     3. Sleep Apnea.  A sleep study will be ordered to evaluate for sleep apnea, which may be  contributing to her high blood pressure and other symptoms such as brain fog and jerking movements during sleep.    Follow-up  The patient will follow up in 1 month.    Any change or worsening of signs or symptoms, patient encouraged to follow-up or report to emergency room for further evaluation. Patient verbalizes understanding and agrees.      PLEASE NOTE: This dictation was created using voice recognition software. I have made every reasonable attempt to correct obvious errors, but I expect that there are errors of grammar and possibly content that I did not discover before finalizing the note.

## 2025-04-17 NOTE — Clinical Note
REFERRAL APPROVAL NOTICE         Sent on April 17, 2025                   Madhavi Aguilarcortez  [address]  [address]  [place] [address]                   Dear Ms. Coffman,    After a careful review of the medical information and benefit coverage, [facility] has processed your referral. See below for additional details.    If applicable, you must be actively enrolled with your insurance for coverage of the authorized service. If you have any questions regarding your coverage, please contact your insurance directly.    REFERRAL INFORMATION   Referral #:  [id number]  Referred-To Department    Referred-By Provider:  Pulmonary and Sleep Medicine    DEXTER Asencio   Pulmonary/sleep [facility] [place] [facility]      [address]  [place] [address] [phone] [address]  [place] [id number] [phone]    Referral Start Date:  04/11/2025  Referral End Date:   04/11/2026           SCHEDULING  If you do not already have an appointment, please call [phone] to make an appointment.   MORE INFORMATION  As a reminder, [facility] ownership has changed, meaning this location is now owned and operated by [facility]. As such, we want to clarify that our patients should expect to receive two separate bills for the services received at [facility] - one representing the [facility] facility fees as the owner of the establishment, and the other to represent the physician's services and subsequent fees. You can speak with your insurance carrier for a pricing estimate by calling the customer service number on the back of your card and ask about charges for a hospital outpatient visit.  If you do not already have a [employer] account, sign up at: [email or web address]  You can access your medical information, make appointments, see lab results, billing  information, and more.  If you have questions regarding this referral, please contact  the [facility] department at:             [phone]. Monday - Friday 7:30AM - 5:00PM.      Sincerely,  [facility]

## 2025-04-30 ENCOUNTER — ANTICOAGULATION VISIT (OUTPATIENT)
Dept: VASCULAR LAB | Facility: MEDICAL CENTER | Age: 60
End: 2025-04-30
Attending: INTERNAL MEDICINE
Payer: MEDICAID

## 2025-04-30 DIAGNOSIS — I82.4Y3 ACUTE DEEP VEIN THROMBOSIS (DVT) OF PROXIMAL VEIN OF BOTH LOWER EXTREMITIES (HCC): ICD-10-CM

## 2025-04-30 PROCEDURE — 99212 OFFICE O/P EST SF 10 MIN: CPT | Performed by: NURSE PRACTITIONER

## 2025-04-30 RX ORDER — CYCLOBENZAPRINE HCL 5 MG
5 TABLET ORAL 3 TIMES DAILY PRN
Qty: 30 TABLET | Refills: 0 | Status: SHIPPED | OUTPATIENT
Start: 2025-04-30

## 2025-04-30 RX ORDER — CYCLOBENZAPRINE HCL 5 MG
5 TABLET ORAL 3 TIMES DAILY PRN
Qty: 30 TABLET | Refills: 1 | Status: SHIPPED
Start: 2025-04-30 | End: 2025-04-30

## 2025-04-30 NOTE — PROGRESS NOTES
Diagnosis: Huan DVTs  Drug: Xarelto 20 mg daily  LOT: 6 mo then re-eval  DOROTHYVASC: n/a  HAS-BLED: 0    Health Status Since Last Assessment  She continues to have pain and swelling to her LLE which both worsen as the day goes on. Her calf is tight and tender. She has associated left foot pain. She is taking Xarelto as instructed without missed doses. 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 She was taking cyclobenzaprine PRN which did help take the edge off the pain.     She does take amlodipine which she takes in the PM. Dose recently decreased.    Hypercoag labs showed she is neg for FVL, PTGM, protein c/s def    Adherence with DOAC Therapy  0 missed dose(s)  BLEEDING RISK ASSESSMENT NB:    Bleeding Risk Assessment  Severe epistaxis - no   Hemoptysis - no  Excessive or unusual bruising / hematomas - no  GIB/melena/BRBPR/hematemesis - no  Hematuria - no   Abnormal vaginal bleeding - no  Concerning daily headache or subdural hematoma symptoms - no  Decreasing hemoglobin or new anemia - no  Falls, presyncope, syncope, or seizures - no  Platelets: 235  Latest hemoglobin:     Lab Results   Component Value Date/Time    WBC 5.7 04/04/2025 03:38 PM    WBC 6.4 04/11/2011 08:15 AM    RBC 4.39 04/04/2025 03:38 PM    RBC 4.63 04/11/2011 08:15 AM    HEMOGLOBIN 13.4 04/04/2025 03:38 PM    HEMATOCRIT 43.4 04/04/2025 03:38 PM    MCV 98.9 (H) 04/04/2025 03:38 PM    MCV 94 04/11/2011 08:15 AM    MCH 30.5 04/04/2025 03:38 PM    MCH 31.1 04/11/2011 08:15 AM    MCHC 30.9 (L) 04/04/2025 03:38 PM    MPV 8.8 (L) 04/04/2025 03:38 PM    NEUTSPOLYS 59.70 04/04/2025 03:38 PM    LYMPHOCYTES 29.70 04/04/2025 03:38 PM    MONOCYTES 7.70 04/04/2025 03:38 PM    EOSINOPHILS 2.40 04/04/2025 03:38 PM    BASOPHILS 0.30 04/04/2025 03:38 PM    HYPOCHROMIA 1+ 02/10/2025 02:21 AM    ANISOCYTOSIS 1+ 04/04/2025 03:38 PM        HAS-BLED:  Hypertension (uncontrolled, >160 mmHg systolic) -  no  Renal disease (dialysis, transplant, Cr >2.26 mg/dL or >200 µmol/L) - no  Liver disease (cirrhosis or bilirubin >2x normal with AST/ALT/AP >3x normal) - no  Stroke history - no  Prior major bleeding or predisposition to bleeding - no  Labile INR Unstable/high INRs, time in therapeutic range <60% - n/a  Age >65 - no  Medication usage predisposing to bleeding (aspirin, clopidogrel, NSAIDs) - no  Alcohol use  >=8 drinks/week - no      Creatinine Clearance/Renal Function  Latest eGFR / creatinine:  Lab Results   Component Value Date/Time    SODIUM 141 04/04/2025 03:38 PM    POTASSIUM 4.0 04/04/2025 03:38 PM    CHLORIDE 106 04/04/2025 03:38 PM    CO2 25 04/04/2025 03:38 PM    GLUCOSE 104 (H) 04/04/2025 03:38 PM    BUN 8 04/04/2025 03:38 PM    CREATININE 0.62 04/04/2025 03:38 PM    CREATININE 0.93 04/11/2011 08:15 AM    BUNCREATRAT 22 05/17/2014 07:50 AM    BUNCREATRAT 14 04/11/2011 08:15 AM    GLOMRATE >59 04/19/2010 08:10 AM       Is eGFR less than 50ml/min  no  Cr cl >100 ml/min    If YES, calculate CrCl (see back)  Any recent dehydrating illness or medications added/changed? i.e. Diuretics      Drug Interactions  ASA/antiplatelets - avoids  NSAID - avoids  Other drug interactions - none (Review med list / OTCs;)  Current Outpatient Medications on File Prior to Visit   Medication Sig Dispense Refill    cyclobenzaprine (FLEXERIL) 5 mg tablet Take 1 Tablet by mouth 3 times a day as needed for Muscle Spasms or Moderate Pain. 30 Tablet 0    losartan (COZAAR) 50 MG Tab Take 1 Tablet by mouth every day. 100 Tablet 3    amLODIPine (NORVASC) 5 MG Tab Take 1 Tablet by mouth every day for 90 days. 90 Tablet 0    rivaroxaban (XARELTO) 20 MG Tab tablet Take 1 Tablet by mouth with dinner. 30 Tablet 6    ferrous sulfate 325 (65 Fe) MG tablet Take 1 Tablet by mouth every day. 30 Tablet 0     No current facility-administered medications on file prior to visit.     Verified no anticonvulsant or azole therapy, education provided for  future use.      Examination  Left leg obviously more swollen than right, particularly at the calf and ankle but also to the dorsum of the foot. Left calf circumference 47 cm, right calf 41 cm. Calf feels tight, warm and full to touch with palpation. Slight redness throughout. No ulcers or lesions noted. Pedal pulses 2+.   Given worsening symptoms I will order a repeat LLE venous duplex. Instructed to avoid prolonged standingry and elevate her leg as much as possible while at work. Flexeril rx renewed as a 1 time courtesy. Also discussed with pt having her f/u with PCP's office. She has no obvious signs of cellulitis aside from slight redness and warmth to her left leg. Pt in agreement with plan and verbalized understanding.    Final Assessment and Recommendations:  Patient appears stable from the anticoagulation standpoint  Benefits of continued DOAC therapy outweigh risks for this patient  Recommend continue current DOAC at same dose  Confirmed she has refills of Xarelto.  Copay affordable as she has medicaid.  Note given to patient to excuse her from work one day last week due to pain.    - continue taking Xarelto 20 mg once daily with food  - get ultrasound scheduled  - go to the ER for shortness of breath, chest pain, pain with deep inhalation, worsening leg swelling and/or pain in calf or leg   - avoid prolonged standing or sitting  - elevate your legs as much as you can throughout the day  - let all your providers know you take this medication  - don't stop this medication without permission from your doctor or our clinic  -  refills before you run out  - if any bleeding lasting 30min without stopping, please seek care with your PCP, urgent care, or ED  - if having any invasive procedure, please make sure the doctor knows of your history of blood clots and current anticoagulation status      Other Actions:   The rationale for continued DOAC therapy  The potential for minor, major or life-threatening  bleeding  Dosing instructions, adherence, risks of non-adherence, handling missed doses  Avoiding OTC ASA & NSAIDs & minimizing EtOH to reduce bleeding risks  Dosing around upcoming procedure / surgery if applicable (see back)    Follow up:  Will follow up with patient in 1 week      Destiny MARTINEZ    Cc: CARISSA MARTINEZ

## 2025-04-30 NOTE — Clinical Note
Gregory Johnson, I saw this pt in the [facility] clinic. She is still having a lot of pain and swelling to her left leg. Her DVT to that leg was fully occlusive and moderate in size, which I have seen these pts have increased symptoms. She has been adherent to Xarelto fortunately, but I went ahead and ordered a repeat venous duplex just in case. Her LLE had some mild redness and was warm to touch. Didn't look like sky cellulitis but kind of reminded me of it a bit. I'll see what the u/s shows. She's coming back to me in 1 week. Just FYI. I'll keep you posted. Thanks, Destiny

## 2025-05-03 ENCOUNTER — OFFICE VISIT (OUTPATIENT)
Dept: URGENT CARE | Facility: PHYSICIAN GROUP | Age: 60
End: 2025-05-03
Payer: MEDICAID

## 2025-05-03 VITALS
WEIGHT: 190 LBS | OXYGEN SATURATION: 96 % | HEART RATE: 106 BPM | DIASTOLIC BLOOD PRESSURE: 88 MMHG | BODY MASS INDEX: 31.62 KG/M2 | SYSTOLIC BLOOD PRESSURE: 130 MMHG | TEMPERATURE: 98.8 F | RESPIRATION RATE: 12 BRPM

## 2025-05-03 DIAGNOSIS — L03.116 CELLULITIS OF LEFT LOWER LEG: ICD-10-CM

## 2025-05-03 DIAGNOSIS — I82.402 DEEP VEIN THROMBOSIS (DVT) OF LEFT LOWER EXTREMITY, UNSPECIFIED CHRONICITY, UNSPECIFIED VEIN (HCC): ICD-10-CM

## 2025-05-03 PROCEDURE — 3075F SYST BP GE 130 - 139MM HG: CPT | Performed by: NURSE PRACTITIONER

## 2025-05-03 PROCEDURE — 3079F DIAST BP 80-89 MM HG: CPT | Performed by: NURSE PRACTITIONER

## 2025-05-03 PROCEDURE — 99214 OFFICE O/P EST MOD 30 MIN: CPT | Performed by: NURSE PRACTITIONER

## 2025-05-03 RX ORDER — CEPHALEXIN 500 MG/1
500 CAPSULE ORAL 4 TIMES DAILY
Qty: 28 CAPSULE | Refills: 0 | Status: SHIPPED | OUTPATIENT
Start: 2025-05-03 | End: 2025-05-10

## 2025-05-03 ASSESSMENT — FIBROSIS 4 INDEX: FIB4 SCORE: 2.02

## 2025-05-03 NOTE — PROGRESS NOTES
Subjective     Madhavi Coffman is a 60 y.o. female who presents with Leg Pain (Rule out of cellulitis, recommended by anti-coag clinic to come, left leg swelling and pain)            HPI  New problem.  Patient is a very pleasant 60-year-old female who presents with left lower leg swelling, and erythema as well as pain.  She was diagnosed earlier in the month with bilateral DVTs and has been on Xarelto and taking this and good compliance.  She reports she was seen in anticoagulation clinic and there was concern at that time for the increased swelling however they reordered the ultrasound which is not to be done till May 13th.  She denies fever, chills, myalgia, or headache.  She denies any nausea.    Neosporin [neomycin-bacitracin-polymyxin], Sudafed [pseudoephedrine hcl], Cleocin [clindamycin hcl], and Tape  Current Outpatient Medications on File Prior to Visit   Medication Sig Dispense Refill    cyclobenzaprine (FLEXERIL) 5 MG tablet Take 1 Tablet by mouth 3 times a day as needed for Muscle Spasms or Moderate Pain. 30 Tablet 0    losartan (COZAAR) 50 MG Tab Take 1 Tablet by mouth every day. 100 Tablet 3    amLODIPine (NORVASC) 5 MG Tab Take 1 Tablet by mouth every day for 90 days. 90 Tablet 0    rivaroxaban (XARELTO) 20 MG Tab tablet Take 1 Tablet by mouth with dinner. 30 Tablet 6    ferrous sulfate 325 (65 Fe) MG tablet Take 1 Tablet by mouth every day. (Patient not taking: Reported on 5/3/2025) 30 Tablet 0     No current facility-administered medications on file prior to visit.     Social History     Socioeconomic History    Marital status:      Spouse name: Not on file    Number of children: Not on file    Years of education: Not on file    Highest education level: Not on file   Occupational History    Not on file   Tobacco Use    Smoking status: Former     Current packs/day: 0.00     Types: Cigarettes     Quit date: 2024     Years since quittin.3    Smokeless tobacco: Never    Tobacco  comments:     1995.  Quit smoking 12/7/2022   Vaping Use    Vaping status: Never Used   Substance and Sexual Activity    Alcohol use: Yes     Alcohol/week: 0.5 oz     Types: 1 Standard drinks or equivalent per week     Comment: ocass    Drug use: No    Sexual activity: Yes     Partners: Male   Other Topics Concern    Not on file   Social History Narrative    Not on file     Social Drivers of Health     Financial Resource Strain: Not on file   Food Insecurity: No Food Insecurity (2/9/2025)    Hunger Vital Sign     Worried About Running Out of Food in the Last Year: Never true     Ran Out of Food in the Last Year: Never true   Transportation Needs: No Transportation Needs (2/9/2025)    PRAPARE - Transportation     Lack of Transportation (Medical): No     Lack of Transportation (Non-Medical): No   Physical Activity: Not on file   Stress: Not on file   Social Connections: Not on file   Intimate Partner Violence: Not At Risk (2/9/2025)    Humiliation, Afraid, Rape, and Kick questionnaire     Fear of Current or Ex-Partner: No     Emotionally Abused: No     Physically Abused: No     Sexually Abused: No   Housing Stability: Low Risk  (2/9/2025)    Housing Stability Vital Sign     Unable to Pay for Housing in the Last Year: No     Number of Times Moved in the Last Year: 0     Homeless in the Last Year: No     Breast Cancer-related family history is not on file.      ROS           Objective     /88 (BP Location: Left arm, Patient Position: Sitting, BP Cuff Size: Adult)   Pulse (!) 106   Temp 37.1 °C (98.8 °F) (Temporal)   Resp 12   Wt 86.2 kg (190 lb)   LMP 08/03/1999   SpO2 96%   BMI 31.62 kg/m²      Physical Exam  Vitals and nursing note reviewed.   Constitutional:       Appearance: Normal appearance.   Cardiovascular:      Rate and Rhythm: Regular rhythm. Tachycardia present.      Heart sounds: No murmur heard.  Pulmonary:      Effort: Pulmonary effort is normal.      Breath sounds: Normal breath sounds.    Musculoskeletal:      Left lower leg: Swelling and tenderness present.      Comments: Significantly more swelling of her left lower leg versus her right.  She has erythema from her ankle up to her knee.  Area is tender to palpation.   Skin:     General: Skin is warm and dry.      Findings: Erythema present.   Neurological:      General: No focal deficit present.      Mental Status: She is alert and oriented to person, place, and time.   Psychiatric:         Mood and Affect: Mood normal.                                  Assessment & Plan       1. Cellulitis of left lower leg        2. Deep vein thrombosis (DVT) of left lower extremity, unspecified chronicity, unspecified vein (HCC)          Keflex.  Patient cautioned that she is go to ED if not improving in next 24-48 hours.  Differential diagnosis, natural history, supportive care, and indications for immediate follow-up were discussed.

## 2025-05-08 ENCOUNTER — ANTICOAGULATION VISIT (OUTPATIENT)
Dept: VASCULAR LAB | Facility: MEDICAL CENTER | Age: 60
End: 2025-05-08
Attending: INTERNAL MEDICINE
Payer: MEDICAID

## 2025-05-08 VITALS — SYSTOLIC BLOOD PRESSURE: 99 MMHG | DIASTOLIC BLOOD PRESSURE: 67 MMHG | HEART RATE: 77 BPM

## 2025-05-08 DIAGNOSIS — I82.4Y3 ACUTE DEEP VEIN THROMBOSIS (DVT) OF PROXIMAL VEIN OF BOTH LOWER EXTREMITIES (HCC): ICD-10-CM

## 2025-05-08 PROCEDURE — 99212 OFFICE O/P EST SF 10 MIN: CPT | Performed by: NURSE PRACTITIONER

## 2025-05-08 NOTE — PROGRESS NOTES
Diagnosis: Huan DVTs  Drug: Xarelto 20 mg daily  LOT: 6 mo then re-eval  DOROTHYVASC: n/a  HAS-BLED: 0    Health Status Since Last Assessment  Overall doing better this week. She went to  last week and was started on a course of cephalexin for cellulitis which she will complete tomorrow. She has a f/u venous duplex scheduled for next week. Still has LLE swelling that worsens as the day goes on but it is improved and almost non existent upon waking in the morning. Mild pain to her left calf. Redness still present but less than last week. No SOB or CP. She is taking Xarelto as instructed without missed doses. 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 She is taking cyclobenzaprine TID PRN for pain.    She does take amlodipine which she takes in the PM. Dose recently decreased.    Adherence with DOAC Therapy  0 missed dose(s)  BLEEDING RISK ASSESSMENT NB:    Bleeding Risk Assessment  Severe epistaxis - no   Hemoptysis - no  Excessive or unusual bruising / hematomas - no  GIB/melena/BRBPR/hematemesis - no  Hematuria - no   Abnormal vaginal bleeding - no  Concerning daily headache or subdural hematoma symptoms - no  Decreasing hemoglobin or new anemia - no  Falls, presyncope, syncope, or seizures - no  Platelets: 235  Latest hemoglobin:     Lab Results   Component Value Date/Time    WBC 5.7 04/04/2025 03:38 PM    WBC 6.4 04/11/2011 08:15 AM    RBC 4.39 04/04/2025 03:38 PM    RBC 4.63 04/11/2011 08:15 AM    HEMOGLOBIN 13.4 04/04/2025 03:38 PM    HEMATOCRIT 43.4 04/04/2025 03:38 PM    MCV 98.9 (H) 04/04/2025 03:38 PM    MCV 94 04/11/2011 08:15 AM    MCH 30.5 04/04/2025 03:38 PM    MCH 31.1 04/11/2011 08:15 AM    MCHC 30.9 (L) 04/04/2025 03:38 PM    MPV 8.8 (L) 04/04/2025 03:38 PM    NEUTSPOLYS 59.70 04/04/2025 03:38 PM    LYMPHOCYTES 29.70 04/04/2025 03:38 PM    MONOCYTES 7.70 04/04/2025 03:38 PM    EOSINOPHILS 2.40 04/04/2025 03:38 PM    BASOPHILS 0.30  04/04/2025 03:38 PM    HYPOCHROMIA 1+ 02/10/2025 02:21 AM    ANISOCYTOSIS 1+ 04/04/2025 03:38 PM        HAS-BLED:  Hypertension (uncontrolled, >160 mmHg systolic) - no  Renal disease (dialysis, transplant, Cr >2.26 mg/dL or >200 µmol/L) - no  Liver disease (cirrhosis or bilirubin >2x normal with AST/ALT/AP >3x normal) - no  Stroke history - no  Prior major bleeding or predisposition to bleeding - no  Labile INR Unstable/high INRs, time in therapeutic range <60% - n/a  Age >65 - no  Medication usage predisposing to bleeding (aspirin, clopidogrel, NSAIDs) - no  Alcohol use  >=8 drinks/week - no      Creatinine Clearance/Renal Function  Latest eGFR / creatinine:  Lab Results   Component Value Date/Time    SODIUM 141 04/04/2025 03:38 PM    POTASSIUM 4.0 04/04/2025 03:38 PM    CHLORIDE 106 04/04/2025 03:38 PM    CO2 25 04/04/2025 03:38 PM    GLUCOSE 104 (H) 04/04/2025 03:38 PM    BUN 8 04/04/2025 03:38 PM    CREATININE 0.62 04/04/2025 03:38 PM    CREATININE 0.93 04/11/2011 08:15 AM    BUNCREATRAT 22 05/17/2014 07:50 AM    BUNCREATRAT 14 04/11/2011 08:15 AM    GLOMRATE >59 04/19/2010 08:10 AM       Is eGFR less than 50ml/min  no  Cr cl >100 ml/min    If YES, calculate CrCl (see back)  Any recent dehydrating illness or medications added/changed? i.e. Diuretics      Drug Interactions  ASA/antiplatelets - avoids  NSAID - avoids  Other drug interactions - none (Review med list / OTCs;)  Current Outpatient Medications on File Prior to Visit   Medication Sig Dispense Refill    cephALEXin (KEFLEX) 500 MG Cap Take 1 Capsule by mouth 4 times a day for 7 days. 28 Capsule 0    cyclobenzaprine (FLEXERIL) 5 MG tablet Take 1 Tablet by mouth 3 times a day as needed for Muscle Spasms or Moderate Pain. 30 Tablet 0    losartan (COZAAR) 50 MG Tab Take 1 Tablet by mouth every day. 100 Tablet 3    amLODIPine (NORVASC) 5 MG Tab Take 1 Tablet by mouth every day for 90 days. 90 Tablet 0    rivaroxaban (XARELTO) 20 MG Tab tablet Take 1 Tablet by  mouth with dinner. 30 Tablet 6    ferrous sulfate 325 (65 Fe) MG tablet Take 1 Tablet by mouth every day. (Patient not taking: Reported on 5/3/2025) 30 Tablet 0     No current facility-administered medications on file prior to visit.     Verified no anticonvulsant or azole therapy, education provided for future use.    Examination  Left leg still more swollen than right, particularly at the calf and ankle but also to the dorsum of the foot but improved. Left calf circumference 43 cm (47 cm last week), right calf 41 cm. Calf feels softer to touch. Non tender with palpation. Slight redness throughout.No ulcers or lesions noted.     BP 99/67  HR 77    Final Assessment and Recommendations:  Patient appears stable from the anticoagulation standpoint  Benefits of continued DOAC therapy outweigh risks for this patient  Recommend continue current DOAC at same dose  Confirmed she has refills of Xarelto.  Copay affordable as she has medicaid.  Pt to continue to avoid prolonged standing and elevate her leg as much as possible while at work.   She is requesting another refill of cyclobenzaprine. Given a rx for 30 tabs last week. She has ~15 left. Recommend she try to make the 15 tablets last another week and that she can supplement with an occasional dose of ibuprofen though acetaminophen is preferred due to bleeding risk.    - continue taking Xarelto 20 mg once daily with food  - get ultrasound scheduled done  - go to the ER for shortness of breath, chest pain, pain with deep inhalation, worsening leg swelling and/or pain in calf or leg   - avoid prolonged standing or sitting  - elevate your legs as much as you can throughout the day  - let all your providers know you take this medication  - don't stop this medication without permission from your doctor or our clinic  -  refills before you run out  - if any bleeding lasting 30min without stopping, please seek care with your PCP, urgent care, or ED  - if having any  invasive procedure, please make sure the doctor knows of your history of blood clots and current anticoagulation status      Other Actions:   The rationale for continued DOAC therapy  The potential for minor, major or life-threatening bleeding  Dosing instructions, adherence, risks of non-adherence, handling missed doses  Avoiding OTC ASA & NSAIDs & minimizing EtOH to reduce bleeding risks  Dosing around upcoming procedure / surgery if applicable (see back)    Follow up:  Will follow up with patient in 1 week      Destiny MARTINEZ

## 2025-05-13 ENCOUNTER — HOSPITAL ENCOUNTER (OUTPATIENT)
Dept: RADIOLOGY | Facility: MEDICAL CENTER | Age: 60
End: 2025-05-13
Attending: NURSE PRACTITIONER
Payer: MEDICAID

## 2025-05-13 DIAGNOSIS — I82.4Y3 ACUTE DEEP VEIN THROMBOSIS (DVT) OF PROXIMAL VEIN OF BOTH LOWER EXTREMITIES (HCC): ICD-10-CM

## 2025-05-13 PROCEDURE — 93971 EXTREMITY STUDY: CPT | Mod: LT

## 2025-05-14 ENCOUNTER — ANTICOAGULATION VISIT (OUTPATIENT)
Dept: VASCULAR LAB | Facility: MEDICAL CENTER | Age: 60
End: 2025-05-14
Attending: INTERNAL MEDICINE
Payer: MEDICAID

## 2025-05-14 DIAGNOSIS — I82.4Y3 ACUTE DEEP VEIN THROMBOSIS (DVT) OF PROXIMAL VEIN OF BOTH LOWER EXTREMITIES (HCC): Primary | ICD-10-CM

## 2025-05-14 PROCEDURE — 99212 OFFICE O/P EST SF 10 MIN: CPT | Performed by: NURSE PRACTITIONER

## 2025-05-14 NOTE — PROGRESS NOTES
Diagnosis: Huan DVTs  Drug: Xarelto 20 mg daily  LOT: 6 mo then re-eval  [facility]: n/a  HAS-BLED: 0    Health Status Since Last Assessment  Doing a little better as far as leg swelling and pain from last week. Still has LLE swelling that worsens as the day goes on but it is improved and almost non existent upon waking in the morning. Mild pain to her left calf. Still with a little redness and firmness to palpation to left calf. Completed a course of cephalexin for possibly concurrent cellulitis. No SOB or CP. She is taking Xarelto as instructed without missed doses. 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 She did have a minor fall a couple of days ago but was able to use a cabinet door to help break her fall. She landed on her left knee but not very hard. Did not hit her head. No problems with bleeding/excessive bruising. Repeat LLE u/s done (see below). No further DVT noted to popliteal vein though still w/ possibility of calf thrombus but difficult to tell with swelling. No signs of worsening DVT fortunately.    4/4/25 huan LE venous duplex:   Right lower extremity:   Non-occlusive, acute to subacute thrombus visualized in the superficial    femoral junction extending into the common femoral vein only at the    junction.      Left lower extremity:   Occlusive, acute to subacute thrombus visualized throughout the popliteal    vein into the gastrocnemius veins into the mid calf.     5/13/25 LLE venous duplex:  No evidence of left lower extremity deep venous thrombosis to the level of the popliteal vein.  The calf veins are not delineated. Uncertain whether the result of occlusive thrombus or technical difficulty due to marked soft tissue edema within the calf.      Adherence with DOAC Therapy  0 missed dose(s)  BLEEDING RISK ASSESSMENT NB:    Bleeding Risk Assessment  Severe epistaxis - no   Hemoptysis - no  Excessive or unusual bruising /  hematomas - no  GIB/melena/BRBPR/hematemesis - no  Hematuria - no   Abnormal vaginal bleeding - no  Concerning daily headache or subdural hematoma symptoms - no  Decreasing hemoglobin or new anemia - no  Falls, presyncope, syncope, or seizures - no  Platelets: 235  Latest hemoglobin:     Lab Results   Component Value Date/Time    WBC 5.7 04/04/2025 03:38 PM    WBC 6.4 04/11/2011 08:15 AM    RBC 4.39 04/04/2025 03:38 PM    RBC 4.63 04/11/2011 08:15 AM    HEMOGLOBIN 13.4 04/04/2025 03:38 PM    HEMATOCRIT 43.4 04/04/2025 03:38 PM    MCV 98.9 (H) 04/04/2025 03:38 PM    MCV 94 04/11/2011 08:15 AM    MCH 30.5 04/04/2025 03:38 PM    MCH 31.1 04/11/2011 08:15 AM    MCHC 30.9 (L) 04/04/2025 03:38 PM    MPV 8.8 (L) 04/04/2025 03:38 PM    NEUTSPOLYS 59.70 04/04/2025 03:38 PM    LYMPHOCYTES 29.70 04/04/2025 03:38 PM    MONOCYTES 7.70 04/04/2025 03:38 PM    EOSINOPHILS 2.40 04/04/2025 03:38 PM    BASOPHILS 0.30 04/04/2025 03:38 PM    HYPOCHROMIA 1+ 02/10/2025 02:21 AM    ANISOCYTOSIS 1+ 04/04/2025 03:38 PM        HAS-BLED:  Hypertension (uncontrolled, >160 mmHg systolic) - no  Renal disease (dialysis, transplant, Cr >2.26 mg/dL or >200 µmol/L) - no  Liver disease (cirrhosis or bilirubin >2x normal with AST/ALT/AP >3x normal) - no  Stroke history - no  Prior major bleeding or predisposition to bleeding - no  Labile INR Unstable/high INRs, time in therapeutic range <60% - n/a  Age >65 - no  Medication usage predisposing to bleeding (aspirin, clopidogrel, NSAIDs) - no  Alcohol use  >=8 drinks/week - no      Creatinine Clearance/Renal Function  Latest eGFR / creatinine:  Lab Results   Component Value Date/Time    SODIUM 141 04/04/2025 03:38 PM    POTASSIUM 4.0 04/04/2025 03:38 PM    CHLORIDE 106 04/04/2025 03:38 PM    CO2 25 04/04/2025 03:38 PM    GLUCOSE 104 (H) 04/04/2025 03:38 PM    BUN 8 04/04/2025 03:38 PM    CREATININE 0.62 04/04/2025 03:38 PM    CREATININE 0.93 04/11/2011 08:15 AM    BUNCREATRAT 22 05/17/2014 07:50 AM     BUNCREATRAT 14 04/11/2011 08:15 AM    GLOMRATE >59 04/19/2010 08:10 AM       Is eGFR less than 50ml/min  no  Cr cl >100 ml/min    If YES, calculate CrCl (see back)  Any recent dehydrating illness or medications added/changed? i.e. Diuretics      Drug Interactions  ASA/antiplatelets - avoids  NSAID - avoids  Other drug interactions - none (Review med list / OTCs;)  Current Outpatient Medications on File Prior to Visit   Medication Sig Dispense Refill    cyclobenzaprine (FLEXERIL) 5 MG tablet Take 1 Tablet by mouth 3 times a day as needed for Muscle Spasms or Moderate Pain. 30 Tablet 0    losartan (COZAAR) 50 MG Tab Take 1 Tablet by mouth every day. 100 Tablet 3    amLODIPine (NORVASC) 5 MG Tab Take 1 Tablet by mouth every day for 90 days. 90 Tablet 0    rivaroxaban (XARELTO) 20 MG Tab tablet Take 1 Tablet by mouth with dinner. 30 Tablet 6    ferrous sulfate 325 (65 Fe) MG tablet Take 1 Tablet by mouth every day. (Patient not taking: Reported on 5/3/2025) 30 Tablet 0     No current facility-administered medications on file prior to visit.     Verified no anticonvulsant or azole therapy, education provided for future use.    Examination  Left leg still more swollen than right, particularly at the calf and ankle but also to the dorsum of the foot but improved. Left calf circumference 43 cm (43 last week, 47 cm the week prior), right calf remains at 41 cm. Calf feels firm to touch but slightly softer to palpation from last week. Non tender with palpation. Slight redness throughout. No ulcers or lesions noted.     Final Assessment and Recommendations:  Patient appears stable from the anticoagulation standpoint  Benefits of continued DOAC therapy outweigh risks for this patient  Recommend continue current DOAC at same dose  Confirmed she has refills of Xarelto.  Copay affordable as she has medicaid.  Pt to continue to avoid prolonged standing and elevate her leg as much as possible while at work.   She sees her PCP on  Friday. Difficult to know if she still possibly has some cellulitis as there is some redness.   Fortunately, u/s showed improvement in DVT.   Will f/u with pt again in 2 weeks.    - continue taking Xarelto 20 mg once daily with food  - go to the ER for shortness of breath, chest pain, pain with deep inhalation, worsening leg swelling and/or pain in calf or leg   - avoid prolonged standing or sitting  - elevate your legs as much as you can throughout the day  - let all your providers know you take this medication  - don't stop this medication without permission from your doctor or our clinic  -  refills before you run out  - if any bleeding lasting 30min without stopping, please seek care with your PCP, urgent care, or ED  - if having any invasive procedure, please make sure the doctor knows of your history of blood clots and current anticoagulation status      Other Actions:   The rationale for continued DOAC therapy  The potential for minor, major or life-threatening bleeding  Dosing instructions, adherence, risks of non-adherence, handling missed doses  Avoiding OTC ASA & NSAIDs & minimizing EtOH to reduce bleeding risks  Dosing around upcoming procedure / surgery if applicable (see back)    Follow up:  Will follow up with patient in 2 weeks      Destiny MARTINEZ

## 2025-05-16 ENCOUNTER — OFFICE VISIT (OUTPATIENT)
Dept: MEDICAL GROUP | Facility: MEDICAL CENTER | Age: 60
End: 2025-05-16
Attending: NURSE PRACTITIONER
Payer: MEDICAID

## 2025-05-16 VITALS
OXYGEN SATURATION: 95 % | HEART RATE: 98 BPM | BODY MASS INDEX: 30.62 KG/M2 | DIASTOLIC BLOOD PRESSURE: 78 MMHG | TEMPERATURE: 96.5 F | HEIGHT: 66 IN | WEIGHT: 190.5 LBS | SYSTOLIC BLOOD PRESSURE: 106 MMHG | RESPIRATION RATE: 16 BRPM

## 2025-05-16 DIAGNOSIS — L03.90 CELLULITIS, UNSPECIFIED CELLULITIS SITE: Primary | ICD-10-CM

## 2025-05-16 PROCEDURE — 3074F SYST BP LT 130 MM HG: CPT | Performed by: NURSE PRACTITIONER

## 2025-05-16 PROCEDURE — 3078F DIAST BP <80 MM HG: CPT | Performed by: NURSE PRACTITIONER

## 2025-05-16 PROCEDURE — 99212 OFFICE O/P EST SF 10 MIN: CPT | Performed by: NURSE PRACTITIONER

## 2025-05-16 PROCEDURE — 99214 OFFICE O/P EST MOD 30 MIN: CPT | Performed by: NURSE PRACTITIONER

## 2025-05-16 ASSESSMENT — FIBROSIS 4 INDEX: FIB4 SCORE: 2.02

## 2025-05-20 NOTE — PROGRESS NOTES
Verbal consent was acquired by the patient to use [employer] ambient listening note generation during this visit     Chief Complaint   Patient presents with    Follow-Up     Pt mention she went to urgent care and was dx with cellulitis.       Subjective:     HPI:   History of Present Illness  The patient presents for evaluation of a blood clot in her calf and cellulitis.    She underwent an ultrasound, which revealed the resolution of a previously identified clot behind her knee. However, a clot in her calf persists. She experiences pain when the swelling in her calf increases. She is currently on anticoagulant therapy and has experienced occasional dizziness upon standing. She reports a recent incident at work where she felt as though she was falling, but managed to prevent a complete fall by grabbing onto a cupboard. She did not sustain any injuries or bruising from this incident.     She sought medical attention at an urgent care facility due to concerns about cellulitis. She was prescribed a 7-day course of Keflex 2000 mg daily, which significantly improved her symptoms. Despite the completion of this treatment, she continues to experience leg swelling, although it is less severe than before. She has been managing her symptoms by elevating her feet for 3 hours each night, which has resulted in a reduction in swelling. She has also been using a lotion provided during her hospital stay, which she finds effective and non-irritating.     She has been maintaining hydration by consuming approximately 10 bottles of water daily and has found that BodyArmor drinks with electrolytes have been beneficial. She continues to take muscle relaxants as needed for pain management. She has been sleeping with 3 pillows between her legs, which she believes is beneficial. She has been wearing tennis shoes throughout the day and carries a bag with sandals, slippers, and socks for comfort.        No problems updated.    ROS  See HPI  "    Allergies[1]    Current medicines (including changes today)  Current Medications[2]    Social History[3]    Patient Active Problem List    Diagnosis Date Noted    Deep vein thrombosis (HCC) 04/09/2025    Status post gastric bypass for obesity 02/10/2025    Acute renal failure (HCC) 02/09/2025    Gastric ulcer 02/09/2025    Anemia 02/09/2025    Age-related osteoporosis without current pathological fracture 04/16/2024    Skin infection 04/07/2024    GERD with esophagitis 02/07/2024    Vitamin D deficiency 02/07/2024    DDD (degenerative disc disease), lumbar 02/07/2024    Primary hypertension 02/07/2024    Hyperlipidemia LDL goal <100 02/07/2024    Intractable migraine without aura     Arthritis     Hiatus hernia syndrome     Peripheral neuropathy     Diverticulosis     Anxiety 09/01/2009    Depression 09/01/2009    Obesity 08/03/2009    Low back pain 05/22/2009       Family History   Problem Relation Age of Onset    Cancer Father         ? type of cancer    Diabetes Father     Hypertension Father     Other Father         Migraine    Cancer Maternal Aunt         breast    Heart Disease Maternal Grandmother     Dementia Maternal Grandmother     Heart Disease Maternal Grandfather     Alcohol/Drug Paternal Grandfather           Objective:     /78 (BP Location: Left arm, Patient Position: Sitting, BP Cuff Size: Adult)   Pulse 98   Temp 35.8 °C (96.5 °F) (Skin)   Resp 16   Ht 1.664 m (5' 5.5\")   Wt 86.4 kg (190 lb 8 oz)   SpO2 95%  Body mass index is 31.22 kg/m².    Physical Exam:  Physical Exam  Vitals reviewed.   Constitutional:       General: She is awake.      Appearance: Normal appearance. She is well-developed.   HENT:      Head: Normocephalic.   Eyes:      Conjunctiva/sclera: Conjunctivae normal.   Cardiovascular:      Rate and Rhythm: Normal rate.   Pulmonary:      Effort: Pulmonary effort is normal. No respiratory distress.   Musculoskeletal:      Cervical back: Neck supple.   Skin:     General: " Skin is warm and dry.   Neurological:      Mental Status: She is alert and oriented to person, place, and time.   Psychiatric:         Mood and Affect: Mood normal.         Behavior: Behavior normal. Behavior is cooperative.              Assessment and Plan:     The following treatment plan was discussed:    Problem List Items Addressed This Visit    None      Assessment & Plan  1. Deep Vein Thrombosis (DVT).  - The clot behind the knee has resolved, but the clot in the calf persists.  - Currently on blood thinners; occasional dizziness reported.  - Advised that it can take 6 months to a year for clots to fully dissolve. Increase water intake and limit salt intake to prevent swelling.  - Continue current blood thinner regimen and incorporate BodyArmor with electrolytes once or twice daily.    2. Cellulitis.  - Previously treated with Keflex 2000 mg per day for 7 days, resulting in symptom improvement.  - Leg swelling has significantly decreased with elevation and rest.  - No further antibiotics required at this time.  - Advised to keep the skin moisturized and elevated to prevent recurrence.    Any change or worsening of signs or symptoms, patient encouraged to follow-up or report to emergency room for further evaluation. Patient verbalizes understanding and agrees.      PLEASE NOTE: This dictation was created using voice recognition software. I have made every reasonable attempt to correct obvious errors, but I expect that there are errors of grammar and possibly content that I did not discover before finalizing the note.       [1]   Allergies  Allergen Reactions    Neosporin [Neomycin-Bacitracin-Polymyxin] Vomiting and Swelling    Sudafed [Pseudoephedrine Hcl] Rash          Cleocin [Clindamycin Hcl]      Stomach upset    Tape Contact Dermatitis   [2]   Current Outpatient Medications   Medication Sig Dispense Refill    cyclobenzaprine (FLEXERIL) 5 MG tablet Take 1 Tablet by mouth 3 times a day as needed for Muscle  Spasms or Moderate Pain. 30 Tablet 0    losartan (COZAAR) 50 MG Tab Take 1 Tablet by mouth every day. 100 Tablet 3    amLODIPine (NORVASC) 5 MG Tab Take 1 Tablet by mouth every day for 90 days. 90 Tablet 0    rivaroxaban (XARELTO) 20 MG Tab tablet Take 1 Tablet by mouth with dinner. 30 Tablet 6    ferrous sulfate 325 (65 Fe) MG tablet Take 1 Tablet by mouth every day. 30 Tablet 0     No current facility-administered medications for this visit.   [3]   Social History  Tobacco Use    Smoking status: Former     Current packs/day: 0.00     Types: Cigarettes     Quit date: 2024     Years since quittin.3    Smokeless tobacco: Never    Tobacco comments:     .  Quit smoking 2022   Vaping Use    Vaping status: Never Used   Substance Use Topics    Alcohol use: Yes     Alcohol/week: 0.5 oz     Types: 1 Standard drinks or equivalent per week     Comment: ocass    Drug use: No

## 2025-05-25 DIAGNOSIS — I82.4Y3 ACUTE DEEP VEIN THROMBOSIS (DVT) OF PROXIMAL VEIN OF BOTH LOWER EXTREMITIES (HCC): ICD-10-CM

## 2025-05-28 ENCOUNTER — ANTICOAGULATION VISIT (OUTPATIENT)
Dept: VASCULAR LAB | Facility: MEDICAL CENTER | Age: 60
End: 2025-05-28
Attending: INTERNAL MEDICINE
Payer: MEDICAID

## 2025-05-28 ENCOUNTER — PHARMACY VISIT (OUTPATIENT)
Dept: PHARMACY | Facility: MEDICAL CENTER | Age: 60
End: 2025-05-28
Payer: COMMERCIAL

## 2025-05-28 VITALS — SYSTOLIC BLOOD PRESSURE: 109 MMHG | HEART RATE: 92 BPM | DIASTOLIC BLOOD PRESSURE: 74 MMHG

## 2025-05-28 DIAGNOSIS — I82.4Y3 ACUTE DEEP VEIN THROMBOSIS (DVT) OF PROXIMAL VEIN OF BOTH LOWER EXTREMITIES (HCC): Primary | ICD-10-CM

## 2025-05-28 PROCEDURE — RXMED WILLOW AMBULATORY MEDICATION CHARGE: Performed by: NURSE PRACTITIONER

## 2025-05-28 PROCEDURE — 99212 OFFICE O/P EST SF 10 MIN: CPT | Performed by: NURSE PRACTITIONER

## 2025-05-28 RX ORDER — CYCLOBENZAPRINE HCL 5 MG
5 TABLET ORAL 3 TIMES DAILY PRN
Qty: 30 TABLET | Refills: 0 | Status: SHIPPED | OUTPATIENT
Start: 2025-05-28

## 2025-05-28 NOTE — PROGRESS NOTES
Diagnosis: Huan DVTs  Drug: Xarelto 20 mg daily  LOT: 6 mo then re-eval  CHADSVASC: n/a  HAS-BLED: 0    Health Status Since Last Assessment  Overall, doing well. Still with huan LE swelling, particularly to LLE. Has some pain to left calf. Able to take breaks and elevate her legs while working. Adherent to her DOAC. No problems with bleeding. Saw her PCP since her last visit. No changes to her medical regimen. Of note, pt is short on Xarelto after dropping her bottle and losing some of her tablets. She used her last tablet last night. Soonest she can  her refill is 6/3/25.      Adherence with DOAC Therapy  0 missed dose(s)  BLEEDING RISK ASSESSMENT NB:    Bleeding Risk Assessment  Severe epistaxis - no   Hemoptysis - no  Excessive or unusual bruising / hematomas - no  GIB/melena/BRBPR/hematemesis - no  Hematuria - no   Abnormal vaginal bleeding - no  Concerning daily headache or subdural hematoma symptoms - no  Decreasing hemoglobin or new anemia - no  Falls, presyncope, syncope, or seizures - no  Platelets: 235  Latest hemoglobin:     Lab Results   Component Value Date/Time    WBC 5.7 04/04/2025 03:38 PM    WBC 6.4 04/11/2011 08:15 AM    RBC 4.39 04/04/2025 03:38 PM    RBC 4.63 04/11/2011 08:15 AM    HEMOGLOBIN 13.4 04/04/2025 03:38 PM    HEMATOCRIT 43.4 04/04/2025 03:38 PM    MCV 98.9 (H) 04/04/2025 03:38 PM    MCV 94 04/11/2011 08:15 AM    MCH 30.5 04/04/2025 03:38 PM    MCH 31.1 04/11/2011 08:15 AM    MCHC 30.9 (L) 04/04/2025 03:38 PM    MPV 8.8 (L) 04/04/2025 03:38 PM    NEUTSPOLYS 59.70 04/04/2025 03:38 PM    LYMPHOCYTES 29.70 04/04/2025 03:38 PM    MONOCYTES 7.70 04/04/2025 03:38 PM    EOSINOPHILS 2.40 04/04/2025 03:38 PM    BASOPHILS 0.30 04/04/2025 03:38 PM    HYPOCHROMIA 1+ 02/10/2025 02:21 AM    ANISOCYTOSIS 1+ 04/04/2025 03:38 PM        HAS-BLED:  Hypertension (uncontrolled, >160 mmHg systolic) - no  Renal disease (dialysis, transplant, Cr >2.26 mg/dL or >200 µmol/L) - no  Liver disease (cirrhosis  or bilirubin >2x normal with AST/ALT/AP >3x normal) - no  Stroke history - no  Prior major bleeding or predisposition to bleeding - no  Labile INR Unstable/high INRs, time in therapeutic range <60% - n/a  Age >65 - no  Medication usage predisposing to bleeding (aspirin, clopidogrel, NSAIDs) - no  Alcohol use  >=8 drinks/week - no      Creatinine Clearance/Renal Function  Latest eGFR / creatinine:  Lab Results   Component Value Date/Time    SODIUM 141 04/04/2025 03:38 PM    POTASSIUM 4.0 04/04/2025 03:38 PM    CHLORIDE 106 04/04/2025 03:38 PM    CO2 25 04/04/2025 03:38 PM    GLUCOSE 104 (H) 04/04/2025 03:38 PM    BUN 8 04/04/2025 03:38 PM    CREATININE 0.62 04/04/2025 03:38 PM    CREATININE 0.93 04/11/2011 08:15 AM    BUNCREATRAT 22 05/17/2014 07:50 AM    BUNCREATRAT 14 04/11/2011 08:15 AM    GLOMRATE >59 04/19/2010 08:10 AM       Is eGFR less than 50ml/min  no  Cr cl >100 ml/min    If YES, calculate CrCl (see back)  Any recent dehydrating illness or medications added/changed? i.e. Diuretics      Drug Interactions  ASA/antiplatelets - avoids  NSAID - avoids  Other drug interactions - none (Review med list / OTCs;)  Current Outpatient Medications on File Prior to Visit   Medication Sig Dispense Refill    cyclobenzaprine (FLEXERIL) 5 MG tablet Take 1 Tablet by mouth 3 times a day as needed for Muscle Spasms or Moderate Pain. 30 Tablet 0    losartan (COZAAR) 50 MG Tab Take 1 Tablet by mouth every day. 100 Tablet 3    amLODIPine (NORVASC) 5 MG Tab Take 1 Tablet by mouth every day for 90 days. 90 Tablet 0    rivaroxaban (XARELTO) 20 MG Tab tablet Take 1 Tablet by mouth with dinner. 30 Tablet 6    ferrous sulfate 325 (65 Fe) MG tablet Take 1 Tablet by mouth every day. 30 Tablet 0     No current facility-administered medications on file prior to visit.     Verified no anticonvulsant or azole therapy, education provided for future use.    Examination  Left leg still more swollen than right, particularly at the calf and ankle  but also to the dorsum of the foot but improved. Left calf circumference 43 cm (43 prior, 47 previously), right calf remains at 41 cm. Calf feels slightly softer to palpation from previous visit. Non tender with palpation. Slight redness throughout. No ulcers or lesions noted.     Final Assessment and Recommendations:  Patient appears stable from the anticoagulation standpoint  Benefits of continued DOAC therapy outweigh risks for this patient  Recommend continue current DOAC at same dose  Rx for Eliquis 5 mg (#14) rx sent to [facility] on [address] for samples. Spoke w/ the pharmacy staff to ensure to they have samples. Pt will take 5 mg BID until she finishes her supply, THEN resume taking Xarelto 20 mg once daily.  Copay affordable as she has medicaid.  Pt to continue to avoid prolonged standing and elevate her leg as much as possible while at work.   Pt requesting a f/u u/s for her upcoming LOT visit which I have ordered.    - start Eliquis 5 mg by mouth twice daily (1st dose this evening) and continue taking until you run out. THEN resume taking Xarelto 20 mg ONCE daily with food.    Other Actions:   The rationale for continued DOAC therapy  The potential for minor, major or life-threatening bleeding  Dosing instructions, adherence, risks of non-adherence, handling missed doses  Avoiding OTC ASA & NSAIDs & minimizing EtOH to reduce bleeding risks  Dosing around upcoming procedure / surgery if applicable (see back)    Follow up:  Will follow up with patient in 5 weeks      Destiny MARTINEZ

## 2025-07-02 ENCOUNTER — TELEPHONE (OUTPATIENT)
Dept: VASCULAR LAB | Facility: MEDICAL CENTER | Age: 60
End: 2025-07-02
Payer: MEDICAID

## 2025-07-02 ENCOUNTER — HOSPITAL ENCOUNTER (OUTPATIENT)
Dept: RADIOLOGY | Facility: MEDICAL CENTER | Age: 60
End: 2025-07-02
Attending: NURSE PRACTITIONER
Payer: MEDICAID

## 2025-07-02 ENCOUNTER — ANTICOAGULATION VISIT (OUTPATIENT)
Dept: VASCULAR LAB | Facility: MEDICAL CENTER | Age: 60
End: 2025-07-02
Attending: INTERNAL MEDICINE
Payer: MEDICAID

## 2025-07-02 VITALS — HEART RATE: 71 BPM | SYSTOLIC BLOOD PRESSURE: 147 MMHG | DIASTOLIC BLOOD PRESSURE: 88 MMHG

## 2025-07-02 DIAGNOSIS — I82.4Y3 ACUTE DEEP VEIN THROMBOSIS (DVT) OF PROXIMAL VEIN OF BOTH LOWER EXTREMITIES (HCC): Primary | ICD-10-CM

## 2025-07-02 DIAGNOSIS — I82.4Y3 ACUTE DEEP VEIN THROMBOSIS (DVT) OF PROXIMAL VEIN OF BOTH LOWER EXTREMITIES (HCC): ICD-10-CM

## 2025-07-02 PROCEDURE — 93970 EXTREMITY STUDY: CPT

## 2025-07-02 PROCEDURE — 99212 OFFICE O/P EST SF 10 MIN: CPT | Performed by: NURSE PRACTITIONER

## 2025-07-02 PROCEDURE — 3079F DIAST BP 80-89 MM HG: CPT | Performed by: NURSE PRACTITIONER

## 2025-07-02 PROCEDURE — 3077F SYST BP >= 140 MM HG: CPT | Performed by: NURSE PRACTITIONER

## 2025-07-02 PROCEDURE — 99214 OFFICE O/P EST MOD 30 MIN: CPT | Performed by: NURSE PRACTITIONER

## 2025-07-02 RX ORDER — CYCLOBENZAPRINE HCL 5 MG
5 TABLET ORAL 3 TIMES DAILY PRN
Qty: 30 TABLET | Refills: 0 | Status: SHIPPED | OUTPATIENT
Start: 2025-07-02

## 2025-07-02 ASSESSMENT — ENCOUNTER SYMPTOMS
BRUISES/BLEEDS EASILY: 0
SHORTNESS OF BREATH: 0
CLAUDICATION: 0
FALLS: 0
BLOOD IN STOOL: 0
MYALGIAS: 1
HEMOPTYSIS: 0

## 2025-07-02 NOTE — TELEPHONE ENCOUNTER
Received New Start request via [employer]  for apixaban (ELIQUIS) 5 MG Tab (Quantity:60, Day Supply:30)     Insurance: [facility] MEDICAID  Member ID:  [id number]  BIN: [id number]  PCN: [id number]  Group: [id number]     Ran Test claim via [place] & medication shows as $0 copay. Pt was previously prescribed for Xarelto, and is now switched to Eliquis due to ongoing medical diagnosis.     Will release Rx to pharmacy on file: [facility] [id number] [facility] [address] [place] [address]    LARISA Lyon, PhT  Vascular Pharmacy Liaison (Rx Coordinator)  P: [phone]  7/2/2025 10:15 AM

## 2025-07-29 ENCOUNTER — TELEPHONE (OUTPATIENT)
Dept: VASCULAR LAB | Facility: MEDICAL CENTER | Age: 60
End: 2025-07-29
Payer: MEDICAID

## 2025-07-29 NOTE — TELEPHONE ENCOUNTER
Caller: Madhavi Coffman     Topic/issue: Pt only has two days of the Xarelto left and Destiny Dior stopped the Xarelto because Eliquis worked better.  Pt needs refill on Eliquis sent to [facility] in [facility] before she runs out of the Xarelto.     Callback Number: [phone]    Thank You   Caitlyn TA

## 2025-07-29 NOTE — TELEPHONE ENCOUNTER
Called [facility] pharmacy @ [phone] and s/w [facility] to obtain fill status for Eliquis. Per [facility], there were no signs of Eliquis script saved on pt's profile. Went ahead and provided the verbal script via phone with the [facility] and confirmed that pt is no longer going to be on Xarelto and will be switching to Eliquis going forward.     Called pt and left a detailed message.     LARISA Lyon, PhT  Vascular Pharmacy Liaison (Rx Coordinator)  P: [phone]  7/29/2025 3:43 PM

## (undated) DEVICE — KIT CUSTOM PROCEDURE SINGLE FOR ENDO (15/CA)

## (undated) DEVICE — PORT AUXILLARY WATER (50EA/BX)

## (undated) DEVICE — BUTTON ENDOSCOPY DISPOSABLE

## (undated) DEVICE — TOWEL STOP TIMEOUT SAFETY FLAG (40EA/CA)

## (undated) DEVICE — FILM CASSETTE ENDO

## (undated) DEVICE — TUBE CONNECTING SUCTION - CLEAR PLASTIC STERILE 72 IN (50EA/CA)

## (undated) DEVICE — SENSOR OXIMETER ADULT SPO2 RD SET (20EA/BX)

## (undated) DEVICE — ELECTRODE 850 FOAM ADHESIVE - HYDROGEL RADIOTRNSPRNT (50/PK)

## (undated) DEVICE — MASK PANORAMIC OXYGEN PRO2 (30EA/CA)

## (undated) DEVICE — WATER IRRIGATION STERILE 1000ML (12EA/CA)

## (undated) DEVICE — TUBING CLEARLINK DUO-VENT - C-FLO (48EA/CA)

## (undated) DEVICE — NEPTUNE 4 PORT MANIFOLD - (20/PK)

## (undated) DEVICE — SYRINGE ALLIANCE INFLATION (5EA/BX)

## (undated) DEVICE — BLOCK BITE MAXI DENTAL RETENTION RIM (100EA/BX)

## (undated) DEVICE — CANISTER SUCTION RIGID RED 1500CC (40EA/CA)

## (undated) DEVICE — LACTATED RINGERS INJ 1000 ML - (14EA/CA 60CA/PF)

## (undated) DEVICE — SODIUM CHL IRRIGATION 0.9% 1000ML (12EA/CA)

## (undated) DEVICE — BALLOON CRE WG 12-15MM 240CM 5.5 F G